# Patient Record
Sex: FEMALE | Race: BLACK OR AFRICAN AMERICAN | Employment: PART TIME | URBAN - METROPOLITAN AREA
[De-identification: names, ages, dates, MRNs, and addresses within clinical notes are randomized per-mention and may not be internally consistent; named-entity substitution may affect disease eponyms.]

---

## 2017-01-04 ENCOUNTER — APPOINTMENT (OUTPATIENT)
Dept: GENERAL RADIOLOGY | Age: 22
End: 2017-01-04
Payer: COMMERCIAL

## 2017-01-04 ENCOUNTER — HOSPITAL ENCOUNTER (EMERGENCY)
Age: 22
Discharge: HOME | End: 2017-01-04
Payer: COMMERCIAL

## 2017-01-04 ENCOUNTER — TELEPHONE (OUTPATIENT)
Dept: OBGYN | Age: 22
End: 2017-01-04

## 2017-01-04 VITALS
HEIGHT: 63 IN | WEIGHT: 143 LBS | BODY MASS INDEX: 25.34 KG/M2 | TEMPERATURE: 98.1 F | RESPIRATION RATE: 18 BRPM | DIASTOLIC BLOOD PRESSURE: 72 MMHG | SYSTOLIC BLOOD PRESSURE: 108 MMHG | OXYGEN SATURATION: 98 % | HEART RATE: 74 BPM

## 2017-01-04 DIAGNOSIS — R07.89 CHEST WALL PAIN: Primary | ICD-10-CM

## 2017-01-04 LAB
CHP ED QC CHECK: NORMAL
PREGNANCY TEST URINE, POC: NEGATIVE

## 2017-01-04 PROCEDURE — 99285 EMERGENCY DEPT VISIT HI MDM: CPT

## 2017-01-04 PROCEDURE — 71020 XR CHEST STANDARD TWO VW: CPT

## 2017-01-04 PROCEDURE — 93005 ELECTROCARDIOGRAM TRACING: CPT

## 2017-01-04 RX ORDER — NAPROXEN 500 MG/1
500 TABLET ORAL 2 TIMES DAILY WITH MEALS
Qty: 30 TABLET | Refills: 0 | Status: SHIPPED | OUTPATIENT
Start: 2017-01-04 | End: 2018-07-11

## 2017-01-04 RX ORDER — CYCLOBENZAPRINE HCL 10 MG
10 TABLET ORAL 3 TIMES DAILY PRN
Qty: 15 TABLET | Refills: 0 | Status: SHIPPED | OUTPATIENT
Start: 2017-01-04 | End: 2017-01-14

## 2017-01-04 ASSESSMENT — ENCOUNTER SYMPTOMS
SHORTNESS OF BREATH: 0
CHOKING: 0
RHINORRHEA: 0
ABDOMINAL PAIN: 0
EYE DISCHARGE: 0
COUGH: 0
WHEEZING: 0
CHEST TIGHTNESS: 0
NAUSEA: 0
COLOR CHANGE: 0
STRIDOR: 0
DIARRHEA: 0
CONSTIPATION: 0
SORE THROAT: 0
VOMITING: 0
ABDOMINAL DISTENTION: 0

## 2017-01-04 ASSESSMENT — PAIN DESCRIPTION - LOCATION: LOCATION: CHEST

## 2017-01-04 ASSESSMENT — PAIN SCALES - GENERAL: PAINLEVEL_OUTOF10: 5

## 2017-01-04 ASSESSMENT — PAIN DESCRIPTION - FREQUENCY: FREQUENCY: INTERMITTENT

## 2017-01-04 ASSESSMENT — PAIN DESCRIPTION - DESCRIPTORS: DESCRIPTORS: OTHER (COMMENT)

## 2017-01-04 ASSESSMENT — PAIN DESCRIPTION - ORIENTATION: ORIENTATION: LEFT

## 2017-01-06 LAB
EKG ATRIAL RATE: 70 BPM
EKG P AXIS: 49 DEGREES
EKG P-R INTERVAL: 112 MS
EKG Q-T INTERVAL: 388 MS
EKG QRS DURATION: 84 MS
EKG QTC CALCULATION (BAZETT): 419 MS
EKG R AXIS: 8 DEGREES
EKG T AXIS: 31 DEGREES
EKG VENTRICULAR RATE: 70 BPM

## 2017-01-18 ENCOUNTER — NURSE ONLY (OUTPATIENT)
Dept: OBGYN | Age: 22
End: 2017-01-18

## 2017-01-18 DIAGNOSIS — N92.6 IRREGULAR MENSES: Primary | ICD-10-CM

## 2017-01-18 LAB
CONTROL: YES
PREGNANCY TEST URINE, POC: NORMAL

## 2017-01-18 PROCEDURE — 81025 URINE PREGNANCY TEST: CPT | Performed by: OBSTETRICS & GYNECOLOGY

## 2017-01-19 PROCEDURE — 96372 THER/PROPH/DIAG INJ SC/IM: CPT | Performed by: OBSTETRICS & GYNECOLOGY

## 2017-01-19 RX ORDER — MEDROXYPROGESTERONE ACETATE 150 MG/ML
150 INJECTION, SUSPENSION INTRAMUSCULAR ONCE
Status: COMPLETED | OUTPATIENT
Start: 2017-01-19 | End: 2017-01-19

## 2017-01-19 RX ADMIN — MEDROXYPROGESTERONE ACETATE 150 MG: 150 INJECTION, SUSPENSION INTRAMUSCULAR at 09:48

## 2017-04-18 ENCOUNTER — NURSE ONLY (OUTPATIENT)
Dept: OBGYN | Age: 22
End: 2017-04-18

## 2017-04-18 DIAGNOSIS — N92.6 IRREGULAR MENSES: Primary | ICD-10-CM

## 2017-04-18 LAB
CONTROL: YES
PREGNANCY TEST URINE, POC: NORMAL

## 2017-04-18 PROCEDURE — 81025 URINE PREGNANCY TEST: CPT | Performed by: OBSTETRICS & GYNECOLOGY

## 2017-04-18 PROCEDURE — 96372 THER/PROPH/DIAG INJ SC/IM: CPT | Performed by: OBSTETRICS & GYNECOLOGY

## 2017-04-18 RX ORDER — MEDROXYPROGESTERONE ACETATE 150 MG/ML
150 INJECTION, SUSPENSION INTRAMUSCULAR ONCE
Status: COMPLETED | OUTPATIENT
Start: 2017-04-18 | End: 2017-04-18

## 2017-04-18 RX ADMIN — MEDROXYPROGESTERONE ACETATE 150 MG: 150 INJECTION, SUSPENSION INTRAMUSCULAR at 11:25

## 2017-07-10 ENCOUNTER — TELEPHONE (OUTPATIENT)
Dept: OBGYN | Age: 22
End: 2017-07-10

## 2017-07-10 RX ORDER — MEDROXYPROGESTERONE ACETATE 150 MG/ML
150 INJECTION, SUSPENSION INTRAMUSCULAR
Qty: 1 ML | Refills: 3 | Status: SHIPPED | OUTPATIENT
Start: 2017-07-10 | End: 2018-07-11

## 2017-07-11 ENCOUNTER — NURSE ONLY (OUTPATIENT)
Dept: OBGYN | Age: 22
End: 2017-07-11

## 2017-07-11 DIAGNOSIS — N92.6 IRREGULAR MENSES: Primary | ICD-10-CM

## 2017-07-11 LAB
CONTROL: YES
PREGNANCY TEST URINE, POC: NORMAL

## 2017-07-11 PROCEDURE — 81025 URINE PREGNANCY TEST: CPT | Performed by: OBSTETRICS & GYNECOLOGY

## 2017-07-11 PROCEDURE — 96372 THER/PROPH/DIAG INJ SC/IM: CPT | Performed by: OBSTETRICS & GYNECOLOGY

## 2017-07-11 RX ORDER — MEDROXYPROGESTERONE ACETATE 150 MG/ML
150 INJECTION, SUSPENSION INTRAMUSCULAR ONCE
Status: COMPLETED | OUTPATIENT
Start: 2017-07-11 | End: 2017-07-11

## 2017-07-11 RX ADMIN — MEDROXYPROGESTERONE ACETATE 150 MG: 150 INJECTION, SUSPENSION INTRAMUSCULAR at 10:51

## 2017-09-26 ENCOUNTER — NURSE ONLY (OUTPATIENT)
Dept: OBGYN | Age: 22
End: 2017-09-26

## 2017-09-26 DIAGNOSIS — N92.6 IRREGULAR MENSES: Primary | ICD-10-CM

## 2017-09-26 LAB
CONTROL: YES
PREGNANCY TEST URINE, POC: NEGATIVE

## 2017-09-26 PROCEDURE — 81025 URINE PREGNANCY TEST: CPT | Performed by: OBSTETRICS & GYNECOLOGY

## 2017-09-26 PROCEDURE — 96372 THER/PROPH/DIAG INJ SC/IM: CPT | Performed by: OBSTETRICS & GYNECOLOGY

## 2017-09-26 RX ORDER — MEDROXYPROGESTERONE ACETATE 150 MG/ML
150 INJECTION, SUSPENSION INTRAMUSCULAR ONCE
Status: COMPLETED | OUTPATIENT
Start: 2017-09-26 | End: 2017-09-26

## 2017-09-26 RX ADMIN — MEDROXYPROGESTERONE ACETATE 150 MG: 150 INJECTION, SUSPENSION INTRAMUSCULAR at 12:11

## 2017-11-27 ENCOUNTER — OFFICE VISIT (OUTPATIENT)
Dept: OBGYN | Age: 22
End: 2017-11-27

## 2017-11-27 VITALS — DIASTOLIC BLOOD PRESSURE: 64 MMHG | HEART RATE: 84 BPM | HEIGHT: 63 IN | SYSTOLIC BLOOD PRESSURE: 118 MMHG

## 2017-11-27 DIAGNOSIS — Z30.42 DEPO-PROVERA CONTRACEPTIVE STATUS: Primary | ICD-10-CM

## 2017-11-27 PROCEDURE — G8484 FLU IMMUNIZE NO ADMIN: HCPCS | Performed by: OBSTETRICS & GYNECOLOGY

## 2017-11-27 PROCEDURE — 99212 OFFICE O/P EST SF 10 MIN: CPT | Performed by: OBSTETRICS & GYNECOLOGY

## 2017-11-27 PROCEDURE — G8421 BMI NOT CALCULATED: HCPCS | Performed by: OBSTETRICS & GYNECOLOGY

## 2017-11-27 PROCEDURE — 1036F TOBACCO NON-USER: CPT | Performed by: OBSTETRICS & GYNECOLOGY

## 2017-11-27 PROCEDURE — G8427 DOCREV CUR MEDS BY ELIG CLIN: HCPCS | Performed by: OBSTETRICS & GYNECOLOGY

## 2017-11-27 RX ORDER — ASCORBIC ACID, CHOLECALCIFEROL, .ALPHA.-TOCOPHEROL ACETATE, DL-, PYRIDOXINE HYDROCHLORIDE, FOLIC ACID, CYANOCOBALAMIN, BIOTIN, CALCIUM CARBONATE, FERROUS ASPARTO GLYCINATE, IRON, POTASSIUM IODIDE, MAGNESIUM OXIDE, DOCONEXENT AND LOWBUSH BLUEBERRY 60; 1000; 10; 26; 400; 13; 280; 80; 9; 9; 150; 25; 350; 25; 600 MG/1; [IU]/1; [IU]/1; MG/1; UG/1; UG/1; UG/1; MG/1; MG/1; MG/1; UG/1; MG/1; MG/1; MG/1; UG/1
1 CAPSULE, GELATIN COATED ORAL DAILY
Qty: 30 CAPSULE | Refills: 3 | Status: SHIPPED | OUTPATIENT
Start: 2017-11-27 | End: 2018-07-11

## 2017-11-27 NOTE — PROGRESS NOTES
Heart : Regular S1/S2, no M/R/G  Abdomen: Soft , NT, ND , + BS   Pelvic exam : deferred    Assessment:     1. Depo-Provera contraceptive status         Plan:     Advised to stop depo-provera   Wants to stay off BC at this time   POssible attempt to conceive, PNV prescribed. F/u as needed. No orders of the defined types were placed in this encounter. Orders Placed This Encounter   Medications    Ffizcm-NbZwb-MqLxi-Meth-FA-DHA (PRENATE MINI) 18-0.6-0.4-350 MG CAPS     Sig: Take 1 tablet by mouth daily     Dispense:  30 capsule     Refill:  3       Follow Up:  Return if symptoms worsen or fail to improve.         Shalom Graf MD

## 2017-12-07 PROBLEM — Z30.42 DEPO-PROVERA CONTRACEPTIVE STATUS: Status: ACTIVE | Noted: 2017-12-07

## 2018-01-16 ENCOUNTER — OFFICE VISIT (OUTPATIENT)
Dept: OBGYN | Age: 23
End: 2018-01-16

## 2018-01-16 VITALS
HEIGHT: 63 IN | SYSTOLIC BLOOD PRESSURE: 104 MMHG | BODY MASS INDEX: 24.63 KG/M2 | WEIGHT: 139 LBS | HEART RATE: 76 BPM | DIASTOLIC BLOOD PRESSURE: 62 MMHG

## 2018-01-16 DIAGNOSIS — O03.9 SAB (SPONTANEOUS ABORTION): Primary | ICD-10-CM

## 2018-01-16 LAB
CONTROL: YES
PREGNANCY TEST URINE, POC: NORMAL

## 2018-01-16 PROCEDURE — 81025 URINE PREGNANCY TEST: CPT | Performed by: OBSTETRICS & GYNECOLOGY

## 2018-01-16 PROCEDURE — 99213 OFFICE O/P EST LOW 20 MIN: CPT | Performed by: OBSTETRICS & GYNECOLOGY

## 2018-01-16 ASSESSMENT — PATIENT HEALTH QUESTIONNAIRE - PHQ9
SUM OF ALL RESPONSES TO PHQ9 QUESTIONS 1 & 2: 1
SUM OF ALL RESPONSES TO PHQ QUESTIONS 1-9: 1
2. FEELING DOWN, DEPRESSED OR HOPELESS: 0
1. LITTLE INTEREST OR PLEASURE IN DOING THINGS: 1

## 2018-01-29 ENCOUNTER — TELEPHONE (OUTPATIENT)
Dept: OBGYN CLINIC | Age: 23
End: 2018-01-29

## 2018-01-29 NOTE — TELEPHONE ENCOUNTER
Patient started bleeding today. It started heavy then became light. She has abdominal pain. Color of the blood started out  red and towards the middle of the day it turned light pink.  Please advice

## 2018-02-05 ENCOUNTER — OFFICE VISIT (OUTPATIENT)
Dept: OBGYN CLINIC | Age: 23
End: 2018-02-05
Payer: COMMERCIAL

## 2018-02-05 VITALS
HEART RATE: 84 BPM | WEIGHT: 137 LBS | BODY MASS INDEX: 24.27 KG/M2 | HEIGHT: 63 IN | DIASTOLIC BLOOD PRESSURE: 58 MMHG | SYSTOLIC BLOOD PRESSURE: 92 MMHG

## 2018-02-05 DIAGNOSIS — O03.9 SAB (SPONTANEOUS ABORTION): Primary | ICD-10-CM

## 2018-02-05 PROCEDURE — G8484 FLU IMMUNIZE NO ADMIN: HCPCS | Performed by: OBSTETRICS & GYNECOLOGY

## 2018-02-05 PROCEDURE — 99212 OFFICE O/P EST SF 10 MIN: CPT | Performed by: OBSTETRICS & GYNECOLOGY

## 2018-02-05 PROCEDURE — G8420 CALC BMI NORM PARAMETERS: HCPCS | Performed by: OBSTETRICS & GYNECOLOGY

## 2018-02-05 PROCEDURE — 1036F TOBACCO NON-USER: CPT | Performed by: OBSTETRICS & GYNECOLOGY

## 2018-02-05 PROCEDURE — G8427 DOCREV CUR MEDS BY ELIG CLIN: HCPCS | Performed by: OBSTETRICS & GYNECOLOGY

## 2018-02-09 PROBLEM — O03.9 SAB (SPONTANEOUS ABORTION): Status: ACTIVE | Noted: 2018-02-09

## 2018-02-13 ASSESSMENT — ENCOUNTER SYMPTOMS
SHORTNESS OF BREATH: 0
VOMITING: 0
NAUSEA: 0
COUGH: 0

## 2018-02-25 ENCOUNTER — HOSPITAL ENCOUNTER (EMERGENCY)
Age: 23
Discharge: HOME | End: 2018-02-25
Attending: FAMILY MEDICINE
Payer: COMMERCIAL

## 2018-02-25 VITALS
SYSTOLIC BLOOD PRESSURE: 113 MMHG | RESPIRATION RATE: 14 BRPM | BODY MASS INDEX: 23.56 KG/M2 | DIASTOLIC BLOOD PRESSURE: 63 MMHG | TEMPERATURE: 98.9 F | WEIGHT: 133 LBS | OXYGEN SATURATION: 99 % | HEART RATE: 82 BPM

## 2018-02-25 DIAGNOSIS — N39.0 ACUTE UTI: Primary | ICD-10-CM

## 2018-02-25 LAB
BACTERIA: ABNORMAL /HPF
BILIRUBIN URINE: NEGATIVE
BLOOD, URINE: ABNORMAL
CLARITY: ABNORMAL
COLOR: ABNORMAL
CRYSTALS, UA: ABNORMAL
EPITHELIAL CELLS, UA: ABNORMAL /HPF
GLUCOSE URINE: NEGATIVE MG/DL
GONADOTROPIN, CHORIONIC (HCG) QUANT: <0.1 MIU/ML
KETONES, URINE: NEGATIVE MG/DL
LEUKOCYTE ESTERASE, URINE: ABNORMAL
MUCUS: PRESENT
NITRITE, URINE: NEGATIVE
PH UA: 6 (ref 5–9)
PROTEIN UA: 30 MG/DL
RBC UA: ABNORMAL /HPF (ref 0–2)
SPECIFIC GRAVITY UA: 1.02 (ref 1–1.03)
URINE REFLEX TO CULTURE: YES
UROBILINOGEN, URINE: 2 E.U./DL
WBC UA: ABNORMAL /HPF (ref 0–5)

## 2018-02-25 PROCEDURE — 87086 URINE CULTURE/COLONY COUNT: CPT

## 2018-02-25 PROCEDURE — 81001 URINALYSIS AUTO W/SCOPE: CPT

## 2018-02-25 PROCEDURE — 84702 CHORIONIC GONADOTROPIN TEST: CPT

## 2018-02-25 PROCEDURE — 99283 EMERGENCY DEPT VISIT LOW MDM: CPT

## 2018-02-25 PROCEDURE — 36415 COLL VENOUS BLD VENIPUNCTURE: CPT

## 2018-02-25 RX ORDER — SULFAMETHOXAZOLE AND TRIMETHOPRIM 800; 160 MG/1; MG/1
1 TABLET ORAL 2 TIMES DAILY
Qty: 20 TABLET | Refills: 0 | Status: SHIPPED | OUTPATIENT
Start: 2018-02-25 | End: 2018-03-02

## 2018-02-25 ASSESSMENT — ENCOUNTER SYMPTOMS
RESPIRATORY NEGATIVE: 1
FLATUS: 0
ABDOMINAL PAIN: 1
HEMATOCHEZIA: 0
CONSTIPATION: 0
NAUSEA: 0
HEMATEMESIS: 0
BELCHING: 0
EYES NEGATIVE: 1
SHORTNESS OF BREATH: 0
SORE THROAT: 0
COUGH: 0

## 2018-02-25 ASSESSMENT — PAIN DESCRIPTION - LOCATION: LOCATION: ABDOMEN

## 2018-02-25 ASSESSMENT — PAIN SCALES - GENERAL: PAINLEVEL_OUTOF10: 7

## 2018-02-25 ASSESSMENT — PAIN DESCRIPTION - FREQUENCY: FREQUENCY: CONTINUOUS

## 2018-02-25 ASSESSMENT — PAIN DESCRIPTION - DESCRIPTORS: DESCRIPTORS: CRAMPING;SHARP

## 2018-02-25 ASSESSMENT — PAIN DESCRIPTION - ORIENTATION: ORIENTATION: RIGHT;LEFT;LOWER;MID

## 2018-02-26 NOTE — ED PROVIDER NOTES
chest pain. Gastrointestinal: Positive for abdominal pain. Negative for anorexia, constipation, flatus, hematemesis, hematochezia, melena and nausea. Endocrine: Negative. Genitourinary: Positive for dysuria and vaginal bleeding. Negative for hematuria and vaginal discharge. Musculoskeletal: Negative. All other systems reviewed and are negative. Except as noted above the remainder of the review of systems was reviewed and negative. PAST MEDICAL HISTORY     Past Medical History:   Diagnosis Date    Spontaneous           SURGICAL HISTORY     History reviewed. No pertinent surgical history. CURRENT MEDICATIONS       Previous Medications    MEDROXYPROGESTERONE (DEPO-PROVERA) 150 MG/ML INJECTION    Inject 1 mL into the muscle every 3 months    NAPROXEN (NAPROSYN) 500 MG TABLET    Take 1 tablet by mouth 2 times daily (with meals)    ALBQVS-NZTHY-GLKEL-METH-FA-DHA (PRENATE MINI) 18-0.6-0.4-350 MG CAPS    Take 1 tablet by mouth daily    PRENATAL VIT-FE FUMARATE-FA (PRENATAL VITAMINS) 28-0.8 MG TABS    Take 1 tablet by mouth daily       ALLERGIES     Patient has no known allergies.     FAMILY HISTORY       Family History   Problem Relation Age of Onset    Breast Cancer Neg Hx     Ovarian Cancer Neg Hx     Cervical Cancer Neg Hx     Uterine Cancer Neg Hx           SOCIAL HISTORY       Social History     Social History    Marital status: Single     Spouse name: N/A    Number of children: N/A    Years of education: N/A     Social History Main Topics    Smoking status: Never Smoker    Smokeless tobacco: Never Used    Alcohol use No    Drug use: No    Sexual activity: Yes     Partners: Male     Other Topics Concern    None     Social History Narrative    None       SCREENINGS             PHYSICAL EXAM    (up to 7 for level 4, 8 or more for level 5)     ED Triage Vitals [18 1806]   BP Temp Temp src Pulse Resp SpO2 Height Weight   113/63 98.9 °F (37.2 °C) -- 82 14 99 % --

## 2018-02-27 LAB — URINE CULTURE, ROUTINE: NORMAL

## 2018-03-24 ENCOUNTER — HOSPITAL ENCOUNTER (EMERGENCY)
Age: 23
Discharge: HOME | End: 2018-03-24
Attending: EMERGENCY MEDICINE
Payer: COMMERCIAL

## 2018-03-24 VITALS
SYSTOLIC BLOOD PRESSURE: 98 MMHG | HEIGHT: 63 IN | DIASTOLIC BLOOD PRESSURE: 56 MMHG | TEMPERATURE: 98.9 F | BODY MASS INDEX: 23.57 KG/M2 | WEIGHT: 133 LBS | HEART RATE: 70 BPM | OXYGEN SATURATION: 100 % | RESPIRATION RATE: 16 BRPM

## 2018-03-24 DIAGNOSIS — G43.009 MIGRAINE WITHOUT AURA AND WITHOUT STATUS MIGRAINOSUS, NOT INTRACTABLE: ICD-10-CM

## 2018-03-24 DIAGNOSIS — N30.00 ACUTE CYSTITIS WITHOUT HEMATURIA: Primary | ICD-10-CM

## 2018-03-24 LAB
BACTERIA: ABNORMAL /HPF
BASOPHILS ABSOLUTE: 0.1 K/UL (ref 0–0.2)
BASOPHILS RELATIVE PERCENT: 0.6 %
BILIRUBIN URINE: NEGATIVE
BLOOD, URINE: ABNORMAL
CHP ED QC CHECK: NORMAL
CLARITY: ABNORMAL
COLOR: ABNORMAL
EOSINOPHILS ABSOLUTE: 0 K/UL (ref 0–0.7)
EOSINOPHILS RELATIVE PERCENT: 0.1 %
GLUCOSE URINE: NEGATIVE MG/DL
HCT VFR BLD CALC: 37.2 % (ref 37–47)
HEMOGLOBIN: 12.6 G/DL (ref 12–16)
KETONES, URINE: ABNORMAL MG/DL
LEUKOCYTE ESTERASE, URINE: ABNORMAL
LYMPHOCYTES ABSOLUTE: 0.4 K/UL (ref 1–4.8)
LYMPHOCYTES RELATIVE PERCENT: 3.7 %
MCH RBC QN AUTO: 28.3 PG (ref 27–31.3)
MCHC RBC AUTO-ENTMCNC: 34 % (ref 33–37)
MCV RBC AUTO: 83.3 FL (ref 82–100)
MONOCYTES ABSOLUTE: 0.7 K/UL (ref 0.2–0.8)
MONOCYTES RELATIVE PERCENT: 6.1 %
NEUTROPHILS ABSOLUTE: 9.7 K/UL (ref 1.4–6.5)
NEUTROPHILS RELATIVE PERCENT: 89.5 %
NITRITE, URINE: POSITIVE
PDW BLD-RTO: 13.4 % (ref 11.5–14.5)
PH UA: 6 (ref 5–9)
PLATELET # BLD: 140 K/UL (ref 130–400)
PREGNANCY TEST URINE, POC: NEGATIVE
PROTEIN UA: 100 MG/DL
RBC # BLD: 4.47 M/UL (ref 4.2–5.4)
RBC UA: ABNORMAL /HPF (ref 0–2)
SPECIFIC GRAVITY UA: 1.02 (ref 1–1.03)
URINE REFLEX TO CULTURE: YES
UROBILINOGEN, URINE: 4 E.U./DL
WBC # BLD: 10.8 K/UL (ref 4.8–10.8)
WBC UA: ABNORMAL /HPF (ref 0–5)

## 2018-03-24 PROCEDURE — 2580000003 HC RX 258: Performed by: EMERGENCY MEDICINE

## 2018-03-24 PROCEDURE — 99284 EMERGENCY DEPT VISIT MOD MDM: CPT

## 2018-03-24 PROCEDURE — 87186 SC STD MICRODIL/AGAR DIL: CPT

## 2018-03-24 PROCEDURE — 6360000002 HC RX W HCPCS: Performed by: EMERGENCY MEDICINE

## 2018-03-24 PROCEDURE — 81001 URINALYSIS AUTO W/SCOPE: CPT

## 2018-03-24 PROCEDURE — 96365 THER/PROPH/DIAG IV INF INIT: CPT

## 2018-03-24 PROCEDURE — 96375 TX/PRO/DX INJ NEW DRUG ADDON: CPT

## 2018-03-24 PROCEDURE — 87086 URINE CULTURE/COLONY COUNT: CPT

## 2018-03-24 PROCEDURE — 85025 COMPLETE CBC W/AUTO DIFF WBC: CPT

## 2018-03-24 PROCEDURE — 36415 COLL VENOUS BLD VENIPUNCTURE: CPT

## 2018-03-24 PROCEDURE — 87077 CULTURE AEROBIC IDENTIFY: CPT

## 2018-03-24 RX ORDER — 0.9 % SODIUM CHLORIDE 0.9 %
1000 INTRAVENOUS SOLUTION INTRAVENOUS ONCE
Status: COMPLETED | OUTPATIENT
Start: 2018-03-24 | End: 2018-03-24

## 2018-03-24 RX ORDER — KETOROLAC TROMETHAMINE 30 MG/ML
30 INJECTION, SOLUTION INTRAMUSCULAR; INTRAVENOUS ONCE
Status: COMPLETED | OUTPATIENT
Start: 2018-03-24 | End: 2018-03-24

## 2018-03-24 RX ORDER — DICYCLOMINE HYDROCHLORIDE 10 MG/ML
20 INJECTION INTRAMUSCULAR ONCE
Status: DISCONTINUED | OUTPATIENT
Start: 2018-03-24 | End: 2018-03-24

## 2018-03-24 RX ORDER — ONDANSETRON 2 MG/ML
4 INJECTION INTRAMUSCULAR; INTRAVENOUS ONCE
Status: COMPLETED | OUTPATIENT
Start: 2018-03-24 | End: 2018-03-24

## 2018-03-24 RX ORDER — CEPHALEXIN 500 MG/1
500 CAPSULE ORAL 4 TIMES DAILY
Qty: 40 CAPSULE | Refills: 0 | Status: SHIPPED | OUTPATIENT
Start: 2018-03-24 | End: 2018-04-03

## 2018-03-24 RX ORDER — BUTALBITAL, ASPIRIN, AND CAFFEINE 325; 50; 40 MG/1; MG/1; MG/1
1 CAPSULE ORAL EVERY 4 HOURS PRN
Qty: 12 CAPSULE | Refills: 0 | Status: ON HOLD | OUTPATIENT
Start: 2018-03-24 | End: 2020-01-23 | Stop reason: HOSPADM

## 2018-03-24 RX ADMIN — CEFTRIAXONE 1 G: 1 INJECTION, POWDER, FOR SOLUTION INTRAMUSCULAR; INTRAVENOUS at 17:54

## 2018-03-24 RX ADMIN — SODIUM CHLORIDE 1000 ML: 9 INJECTION, SOLUTION INTRAVENOUS at 16:20

## 2018-03-24 RX ADMIN — ONDANSETRON 4 MG: 2 INJECTION INTRAMUSCULAR; INTRAVENOUS at 16:20

## 2018-03-24 RX ADMIN — KETOROLAC TROMETHAMINE 30 MG: 30 INJECTION, SOLUTION INTRAMUSCULAR; INTRAVENOUS at 17:54

## 2018-03-24 ASSESSMENT — ENCOUNTER SYMPTOMS
CHEST TIGHTNESS: 0
VOMITING: 1
WHEEZING: 0
PHOTOPHOBIA: 0
VOICE CHANGE: 0
BACK PAIN: 0
SHORTNESS OF BREATH: 0
RHINORRHEA: 0
ABDOMINAL DISTENTION: 0
ABDOMINAL PAIN: 0
TROUBLE SWALLOWING: 0
ANAL BLEEDING: 0
CONSTIPATION: 0
CHOKING: 0
EYE ITCHING: 0
EYE PAIN: 0
STRIDOR: 0
FACIAL SWELLING: 0
NAUSEA: 1
SINUS PRESSURE: 0
BLOOD IN STOOL: 0
EYE REDNESS: 0
COLOR CHANGE: 0
EYE DISCHARGE: 0
SORE THROAT: 0
DIARRHEA: 0
COUGH: 0

## 2018-03-24 ASSESSMENT — PAIN SCALES - GENERAL
PAINLEVEL_OUTOF10: 7
PAINLEVEL_OUTOF10: 7

## 2018-03-24 ASSESSMENT — PAIN DESCRIPTION - LOCATION: LOCATION: HEAD

## 2018-03-24 ASSESSMENT — PAIN DESCRIPTION - DESCRIPTORS: DESCRIPTORS: ACHING

## 2018-03-24 ASSESSMENT — PAIN DESCRIPTION - ORIENTATION: ORIENTATION: ANTERIOR

## 2018-03-24 NOTE — ED TRIAGE NOTES
A & Ox4. Skin pink warm and dry. Points to lower abdomen for pain. States emesis x 5-6 today along with diarrhea x 3 today. Points to forehead for headache. Denies trying anything for pain.

## 2018-03-26 LAB
ORGANISM: ABNORMAL
URINE CULTURE, ROUTINE: ABNORMAL
URINE CULTURE, ROUTINE: ABNORMAL

## 2018-04-28 ENCOUNTER — HOSPITAL ENCOUNTER (EMERGENCY)
Age: 23
Discharge: HOME | End: 2018-04-28
Payer: COMMERCIAL

## 2018-04-28 VITALS
SYSTOLIC BLOOD PRESSURE: 121 MMHG | HEIGHT: 63 IN | HEART RATE: 90 BPM | DIASTOLIC BLOOD PRESSURE: 74 MMHG | TEMPERATURE: 98.6 F | OXYGEN SATURATION: 98 % | WEIGHT: 131 LBS | BODY MASS INDEX: 23.21 KG/M2 | RESPIRATION RATE: 18 BRPM

## 2018-04-28 DIAGNOSIS — H66.90 ACUTE OTITIS MEDIA, UNSPECIFIED OTITIS MEDIA TYPE: Primary | ICD-10-CM

## 2018-04-28 DIAGNOSIS — N30.00 ACUTE CYSTITIS WITHOUT HEMATURIA: ICD-10-CM

## 2018-04-28 LAB
AMORPHOUS: NORMAL
BACTERIA: NORMAL /HPF
BILIRUBIN URINE: NEGATIVE
BLOOD, URINE: ABNORMAL
CLARITY: ABNORMAL
COLOR: YELLOW
GLUCOSE URINE: NEGATIVE MG/DL
KETONES, URINE: NEGATIVE MG/DL
LEUKOCYTE ESTERASE, URINE: ABNORMAL
MUCUS: PRESENT
NITRITE, URINE: NEGATIVE
PH UA: 7.5 (ref 5–9)
PROTEIN UA: NEGATIVE MG/DL
RBC UA: NORMAL /HPF (ref 0–2)
SPECIFIC GRAVITY UA: 1.02 (ref 1–1.03)
URINE REFLEX TO CULTURE: YES
UROBILINOGEN, URINE: 1 E.U./DL
WBC UA: NORMAL /HPF (ref 0–5)

## 2018-04-28 PROCEDURE — 99283 EMERGENCY DEPT VISIT LOW MDM: CPT

## 2018-04-28 PROCEDURE — 87086 URINE CULTURE/COLONY COUNT: CPT

## 2018-04-28 PROCEDURE — 81001 URINALYSIS AUTO W/SCOPE: CPT

## 2018-04-28 RX ORDER — AMOXICILLIN AND CLAVULANATE POTASSIUM 875; 125 MG/1; MG/1
1 TABLET, FILM COATED ORAL 2 TIMES DAILY
Qty: 20 TABLET | Refills: 0 | Status: SHIPPED | OUTPATIENT
Start: 2018-04-28 | End: 2018-05-08

## 2018-04-28 ASSESSMENT — ENCOUNTER SYMPTOMS
SHORTNESS OF BREATH: 0
BACK PAIN: 0
DIARRHEA: 0
SORE THROAT: 0
TROUBLE SWALLOWING: 0
COLOR CHANGE: 0
ABDOMINAL PAIN: 0
CONSTIPATION: 0
VOICE CHANGE: 0
COUGH: 0
VOMITING: 0
NAUSEA: 0

## 2018-04-28 ASSESSMENT — PAIN DESCRIPTION - PAIN TYPE: TYPE: ACUTE PAIN

## 2018-04-28 ASSESSMENT — PAIN DESCRIPTION - LOCATION: LOCATION: EAR;BACK

## 2018-04-28 ASSESSMENT — PAIN SCALES - GENERAL: PAINLEVEL_OUTOF10: 7

## 2018-04-28 ASSESSMENT — PAIN DESCRIPTION - ORIENTATION: ORIENTATION: RIGHT;LOWER

## 2018-04-30 LAB — URINE CULTURE, ROUTINE: NORMAL

## 2018-07-11 ENCOUNTER — OFFICE VISIT (OUTPATIENT)
Dept: OBGYN CLINIC | Age: 23
End: 2018-07-11
Payer: COMMERCIAL

## 2018-07-11 VITALS
BODY MASS INDEX: 23.04 KG/M2 | HEIGHT: 63 IN | WEIGHT: 130 LBS | SYSTOLIC BLOOD PRESSURE: 110 MMHG | DIASTOLIC BLOOD PRESSURE: 64 MMHG | HEART RATE: 84 BPM

## 2018-07-11 DIAGNOSIS — Z11.3 SCREEN FOR STD (SEXUALLY TRANSMITTED DISEASE): ICD-10-CM

## 2018-07-11 DIAGNOSIS — Z31.69 ENCOUNTER FOR PRECONCEPTION CONSULTATION: Primary | ICD-10-CM

## 2018-07-11 LAB
HEPATITIS B SURFACE ANTIGEN INTERPRETATION: NORMAL
HEPATITIS C ANTIBODY INTERPRETATION: NORMAL

## 2018-07-11 PROCEDURE — G8427 DOCREV CUR MEDS BY ELIG CLIN: HCPCS | Performed by: OBSTETRICS & GYNECOLOGY

## 2018-07-11 PROCEDURE — 1036F TOBACCO NON-USER: CPT | Performed by: OBSTETRICS & GYNECOLOGY

## 2018-07-11 PROCEDURE — G8420 CALC BMI NORM PARAMETERS: HCPCS | Performed by: OBSTETRICS & GYNECOLOGY

## 2018-07-11 PROCEDURE — 99213 OFFICE O/P EST LOW 20 MIN: CPT | Performed by: OBSTETRICS & GYNECOLOGY

## 2018-07-11 RX ORDER — ASCORBIC ACID, CHOLECALCIFEROL, .ALPHA.-TOCOPHEROL ACETATE, DL-, PYRIDOXINE HYDROCHLORIDE, FOLIC ACID, CYANOCOBALAMIN, BIOTIN, CALCIUM CARBONATE, FERROUS ASPARTO GLYCINATE, IRON, POTASSIUM IODIDE, MAGNESIUM OXIDE, DOCONEXENT AND LOWBUSH BLUEBERRY 60; 1000; 10; 26; 400; 13; 280; 80; 9; 9; 150; 25; 350; 25; 600 MG/1; [IU]/1; [IU]/1; MG/1; UG/1; UG/1; UG/1; MG/1; MG/1; MG/1; UG/1; MG/1; MG/1; MG/1; UG/1
1 CAPSULE, GELATIN COATED ORAL DAILY
Qty: 30 CAPSULE | Refills: 9 | Status: SHIPPED | OUTPATIENT
Start: 2018-07-11 | End: 2020-11-26

## 2018-07-13 LAB — HIV-1 AND HIV-2 ANTIBODIES: NEGATIVE

## 2018-07-14 LAB
SPECIMEN SOURCE: NORMAL
T. VAGINALIS AMPLIFIED: NEGATIVE

## 2018-07-16 LAB
C. TRACHOMATIS DNA ,URINE: POSITIVE
N. GONORRHOEAE DNA, URINE: NEGATIVE

## 2018-07-16 RX ORDER — AZITHROMYCIN 500 MG/1
1000 TABLET, FILM COATED ORAL ONCE
Qty: 2 TABLET | Refills: 0 | Status: SHIPPED | OUTPATIENT
Start: 2018-07-16 | End: 2018-07-16

## 2018-08-10 PROBLEM — Z11.3 SCREEN FOR STD (SEXUALLY TRANSMITTED DISEASE): Status: RESOLVED | Noted: 2018-07-11 | Resolved: 2018-08-10

## 2018-08-29 ENCOUNTER — OFFICE VISIT (OUTPATIENT)
Dept: OBGYN CLINIC | Age: 23
End: 2018-08-29
Payer: COMMERCIAL

## 2018-08-29 VITALS
DIASTOLIC BLOOD PRESSURE: 62 MMHG | BODY MASS INDEX: 23.57 KG/M2 | HEART RATE: 64 BPM | WEIGHT: 133 LBS | SYSTOLIC BLOOD PRESSURE: 110 MMHG | HEIGHT: 63 IN

## 2018-08-29 DIAGNOSIS — Z11.3 SCREEN FOR STD (SEXUALLY TRANSMITTED DISEASE): ICD-10-CM

## 2018-08-29 DIAGNOSIS — A74.9 CHLAMYDIA: Primary | ICD-10-CM

## 2018-08-29 PROCEDURE — G8427 DOCREV CUR MEDS BY ELIG CLIN: HCPCS | Performed by: OBSTETRICS & GYNECOLOGY

## 2018-08-29 PROCEDURE — 1036F TOBACCO NON-USER: CPT | Performed by: OBSTETRICS & GYNECOLOGY

## 2018-08-29 PROCEDURE — 99213 OFFICE O/P EST LOW 20 MIN: CPT | Performed by: OBSTETRICS & GYNECOLOGY

## 2018-08-29 PROCEDURE — G8420 CALC BMI NORM PARAMETERS: HCPCS | Performed by: OBSTETRICS & GYNECOLOGY

## 2018-09-30 PROBLEM — A74.9 CHLAMYDIA: Status: ACTIVE | Noted: 2018-09-30

## 2018-09-30 PROBLEM — Z11.3 SCREEN FOR STD (SEXUALLY TRANSMITTED DISEASE): Status: ACTIVE | Noted: 2018-09-30

## 2018-10-01 NOTE — PROGRESS NOTES
Chaperone for Intimate Exam    1. Was chaperone offered as part of the rooming process? offered, accepted   2. If Chaperone is declined by patient, NA: chaperone was available and exam completed  3. Chaperone is Hanane Hernandez.
Trachomatis / N. Gonorrhoeae, DNA    C. Trachomatis / N. Gonorrhoeae, DNA   2. Screen for STD (sexually transmitted disease)         Plan:     Vaginal cultures for Chlamydia sent for test of cure  The patient was seen and counseled on all sexually transmitted diseases their symptoms and treatments. Barrier recommendations were made. The patient was made aware of all testing options available to her. Following is the framework for sexually transmitted infection risk reduction counseling I discuss with patients:   Ask I routinely obtain a sexual and substance use history for all patients to assess risk. I do Reinforce confidentiality; I try to be tactful, clear, and nonjudgmental; I check for  assumptions. \"To provide the best care, I ask all my patients about their sexual activity - so tell me about your sex life. \"   \"Tell me about your partners. \" (gender, number, new partners, partners with other partners)   \"What types of sex have you been having? \" (vaginal, anal, oral)   \"How do you protect your partners and yourself during sex? \"    Intervene I provide patients with brief, tailored, behavioral interventions for risk reduction. I Discuss risk with patients:   Unprotected sexual activity   Anonymous partners   Patient or partners with recent STI   History of recreational or intravenous drug use (party drugs, methamphetamines)   Exchange of sex for money or drugs   Recent incarceration   I assess patient's knowledge and misconceptions about STI transmission and assess attitudes and beliefs:   \"What are your concerns about giving or getting an STI? \"   I assess circumstances affecting behaviors (what, where, and with whom; triggers): \"What makes it difficult to use condoms with your partners? \"   \"How do you tell your partner about your HIV status/your STI infection? \"   I assess patient's readiness to change   I negotiate a behavioral goal:   \"What is one thing you can do to reduce your risk of getting HIV

## 2018-10-30 PROBLEM — Z11.3 SCREEN FOR STD (SEXUALLY TRANSMITTED DISEASE): Status: RESOLVED | Noted: 2018-09-30 | Resolved: 2018-10-30

## 2019-05-22 ENCOUNTER — HOSPITAL ENCOUNTER (EMERGENCY)
Age: 24
Discharge: HOME | End: 2019-05-22
Payer: COMMERCIAL

## 2019-05-22 VITALS
OXYGEN SATURATION: 98 % | SYSTOLIC BLOOD PRESSURE: 115 MMHG | TEMPERATURE: 98.2 F | HEART RATE: 88 BPM | RESPIRATION RATE: 20 BRPM | BODY MASS INDEX: 26.66 KG/M2 | HEIGHT: 65 IN | WEIGHT: 160 LBS | DIASTOLIC BLOOD PRESSURE: 74 MMHG

## 2019-05-22 DIAGNOSIS — N39.0 ACUTE UTI: Primary | ICD-10-CM

## 2019-05-22 DIAGNOSIS — Z20.2 POSSIBLE EXPOSURE TO STD: ICD-10-CM

## 2019-05-22 LAB
BACTERIA: ABNORMAL /HPF
BILIRUBIN URINE: NEGATIVE
BLOOD, URINE: NEGATIVE
CLARITY: ABNORMAL
COLOR: YELLOW
EPITHELIAL CELLS, UA: ABNORMAL /HPF (ref 0–5)
GLUCOSE URINE: NEGATIVE MG/DL
KETONES, URINE: ABNORMAL MG/DL
LEUKOCYTE ESTERASE, URINE: ABNORMAL
MUCUS: PRESENT
NITRITE, URINE: POSITIVE
PH UA: 6.5 (ref 5–9)
PROTEIN UA: NEGATIVE MG/DL
RBC UA: ABNORMAL /HPF (ref 0–5)
SPECIFIC GRAVITY UA: 1.03 (ref 1–1.03)
URINE REFLEX TO CULTURE: YES
UROBILINOGEN, URINE: 1 E.U./DL
WBC UA: ABNORMAL /HPF (ref 0–5)

## 2019-05-22 PROCEDURE — 87186 SC STD MICRODIL/AGAR DIL: CPT

## 2019-05-22 PROCEDURE — 96372 THER/PROPH/DIAG INJ SC/IM: CPT

## 2019-05-22 PROCEDURE — 87591 N.GONORRHOEAE DNA AMP PROB: CPT

## 2019-05-22 PROCEDURE — 6360000002 HC RX W HCPCS: Performed by: NURSE PRACTITIONER

## 2019-05-22 PROCEDURE — 87491 CHLMYD TRACH DNA AMP PROBE: CPT

## 2019-05-22 PROCEDURE — 81001 URINALYSIS AUTO W/SCOPE: CPT

## 2019-05-22 PROCEDURE — 99283 EMERGENCY DEPT VISIT LOW MDM: CPT

## 2019-05-22 PROCEDURE — 87086 URINE CULTURE/COLONY COUNT: CPT

## 2019-05-22 PROCEDURE — 6370000000 HC RX 637 (ALT 250 FOR IP): Performed by: NURSE PRACTITIONER

## 2019-05-22 PROCEDURE — 87077 CULTURE AEROBIC IDENTIFY: CPT

## 2019-05-22 RX ORDER — AZITHROMYCIN 500 MG/1
1000 TABLET, FILM COATED ORAL ONCE
Status: COMPLETED | OUTPATIENT
Start: 2019-05-22 | End: 2019-05-22

## 2019-05-22 RX ORDER — CEFTRIAXONE SODIUM 250 MG/1
250 INJECTION, POWDER, FOR SOLUTION INTRAMUSCULAR; INTRAVENOUS ONCE
Status: COMPLETED | OUTPATIENT
Start: 2019-05-22 | End: 2019-05-22

## 2019-05-22 RX ORDER — NITROFURANTOIN 25; 75 MG/1; MG/1
100 CAPSULE ORAL 2 TIMES DAILY
Qty: 20 CAPSULE | Refills: 0 | Status: SHIPPED | OUTPATIENT
Start: 2019-05-22 | End: 2019-06-01

## 2019-05-22 RX ADMIN — AZITHROMYCIN MONOHYDRATE 1000 MG: 500 TABLET ORAL at 13:45

## 2019-05-22 RX ADMIN — CEFTRIAXONE SODIUM 250 MG: 250 INJECTION, POWDER, FOR SOLUTION INTRAMUSCULAR; INTRAVENOUS at 13:45

## 2019-05-22 ASSESSMENT — ENCOUNTER SYMPTOMS
SHORTNESS OF BREATH: 0
DIARRHEA: 0
ABDOMINAL PAIN: 0
BACK PAIN: 0
SORE THROAT: 0
COUGH: 0
VOMITING: 0
NAUSEA: 0

## 2019-05-22 NOTE — ED TRIAGE NOTES
Pt reports that she wants a STD check denies any symptoms but reports that a past partner stated that they tested positive for some

## 2019-05-22 NOTE — ED PROVIDER NOTES
[address] [facility] ED  eMERGENCY dEPARTMENT eNCOUnter      Pt Name: Daisy Mitchell  MRN: [id number]  [place] 1995  Date of evaluation: 2019  Provider: GENO Walter CNP      HISTORY OF PRESENT ILLNESS    Daisy Mitchell is a 26 y.o. female who presents to the Emergency Department with fear of contracted STD. Patient states she denies any vaginal drainage, sores, abdominal pain, back pain or nausea. She states she came to get checked for an STD because her boyfriend said he was checked. REVIEW OF SYSTEMS       Review of Systems   Constitutional: Negative for activity change, appetite change and fever. HENT: Negative for congestion, ear pain and sore throat. Respiratory: Negative for cough and shortness of breath. Cardiovascular: Negative for chest pain. Gastrointestinal: Negative for abdominal pain, diarrhea, nausea and vomiting. Genitourinary: Negative for difficulty urinating, dysuria, flank pain, frequency, genital sores, hematuria, pelvic pain, urgency, vaginal discharge and vaginal pain. Musculoskeletal: Negative for arthralgias and back pain. Skin: Negative for rash. All other systems reviewed and are negative. PAST MEDICAL HISTORY     Past Medical History:   Diagnosis Date    Spontaneous           SURGICAL HISTORY     History reviewed. No pertinent surgical history. CURRENT MEDICATIONS       Previous Medications    BUTALBITAL-ASPIRIN-CAFFEINE (FIORINAL) -40 MG CAPSULE    Take 1 capsule by mouth every 4 hours as needed for Headaches for up to 3 days. VTTQMJ-WKVBI-QAHSX-METH-FA-DHA (PRENATE MINI) 18-0.6-0.4-350 MG CAPS    Take 1 tablet by mouth daily    PRENATAL VIT-FE FUMARATE-FA (PRENATAL VITAMINS) 28-0.8 MG TABS    Take 1 tablet by mouth daily       ALLERGIES     Patient has no known allergies.     FAMILY HISTORY       Family History   Problem Relation Age of Onset    Breast Cancer Neg Hx     Ovarian Cancer Neg Hx  Cervical Cancer Neg Hx     Uterine Cancer Neg Hx           SOCIAL HISTORY       Social History     Socioeconomic History    Marital status: Single     Spouse name: None    Number of children: None    Years of education: None    Highest education level: None   Occupational History    None   Social Needs    Financial resource strain: None    Food insecurity:     Worry: None     Inability: None    Transportation needs:     Medical: None     Non-medical: None   Tobacco Use    Smoking status: Never Smoker    Smokeless tobacco: Never Used   Substance and Sexual Activity    Alcohol use: No     Alcohol/week: 0.0 oz    Drug use: No    Sexual activity: Yes     Partners: Male   Lifestyle    Physical activity:     Days per week: None     Minutes per session: None    Stress: None   Relationships    Social connections:     Talks on phone: None     Gets together: None     Attends Gnosticism service: None     Active member of club or organization: None     Attends meetings of clubs or organizations: None     Relationship status: None    Intimate partner violence:     Fear of current or ex partner: None     Emotionally abused: None     Physically abused: None     Forced sexual activity: None   Other Topics Concern    None   Social History Narrative    None       SCREENINGS      @FLOW([id number])@      PHYSICAL EXAM    (up to 7 for level 4, 8 or more for level 5)     ED Triage Vitals [05/22/19 1308]   BP Temp Temp Source Pulse Resp SpO2 Height Weight   115/74 98.2 °F (36.8 °C) Temporal 88 20 98 % 5' 5\" (1.651 m) 160 lb (72.6 kg)       Physical Exam   Constitutional: She is oriented to person, place, and time. She appears well-developed and well-nourished. HENT:   Head: Normocephalic and atraumatic. Right Ear: External ear normal.   Left Ear: External ear normal.   Mouth/Throat: Oropharynx is clear and moist.   Eyes: Pupils are equal, round, and reactive to light.  Conjunctivae and EOM are normal.   Neck: Normal range of motion. Neck supple. Cardiovascular: Normal rate and regular rhythm. Pulmonary/Chest: Effort normal and breath sounds normal.   Abdominal: Soft. Bowel sounds are normal. She exhibits no distension. There is no tenderness. Musculoskeletal: Normal range of motion. Neurological: She is alert and oriented to person, place, and time. She has normal reflexes. Skin: Skin is warm and dry. Psychiatric: Judgment normal.   Nursing note and vitals reviewed. All other labs were within normal range or not returned as of this dictation. EMERGENCY DEPARTMENT COURSE and DIFFERENTIALDIAGNOSIS/MDM:   Vitals:    Vitals:    05/22/19 1308   BP: 115/74   Pulse: 88   Resp: 20   Temp: 98.2 °F (36.8 °C)   TempSrc: Temporal   SpO2: 98%   Weight: 160 lb (72.6 kg)   Height: 5' 5\" (1.651 m)            26 yr old female with UTI and fear of contracted STD. Prescription for Macrobid was given to the patient. F/U with PCP in 3 days for culture results. Patient verbalizes understanding. PROCEDURES:  Unless otherwise noted below, none     Procedures      FINAL IMPRESSION      1. Acute UTI    2.  Possible exposure to STD          DISPOSITION/PLAN   DISPOSITION Decision To Discharge 05/22/2019 02:30:28 PM          GENO Mast CNP (electronically signed)  Attending Emergency Physician      GENO Mast CNP  05/22/19 [id number]

## 2019-05-24 LAB
ORGANISM: ABNORMAL
URINE CULTURE, ROUTINE: ABNORMAL
URINE CULTURE, ROUTINE: ABNORMAL

## 2019-05-28 LAB
C. TRACHOMATIS DNA ,URINE: NEGATIVE
N. GONORRHOEAE DNA, URINE: NEGATIVE

## 2019-07-17 ENCOUNTER — HOSPITAL ENCOUNTER (EMERGENCY)
Age: 24
Discharge: HOME | End: 2019-07-17
Payer: COMMERCIAL

## 2019-07-17 ENCOUNTER — TELEPHONE (OUTPATIENT)
Dept: OBGYN CLINIC | Age: 24
End: 2019-07-17

## 2019-07-17 VITALS
SYSTOLIC BLOOD PRESSURE: 116 MMHG | BODY MASS INDEX: 28.35 KG/M2 | WEIGHT: 160 LBS | OXYGEN SATURATION: 98 % | RESPIRATION RATE: 16 BRPM | HEIGHT: 63 IN | TEMPERATURE: 98.6 F | HEART RATE: 91 BPM | DIASTOLIC BLOOD PRESSURE: 74 MMHG

## 2019-07-17 DIAGNOSIS — Z32.01 POSITIVE PREGNANCY TEST: Primary | ICD-10-CM

## 2019-07-17 LAB
HCG, URINE, POC: NEGATIVE
Lab: NORMAL
NEGATIVE QC PASS/FAIL: NORMAL
POSITIVE QC PASS/FAIL: NORMAL

## 2019-07-17 PROCEDURE — 99284 EMERGENCY DEPT VISIT MOD MDM: CPT

## 2019-07-17 NOTE — ED PROVIDER NOTES
[address] [facility] ED  EMERGENCY DEPARTMENT ENCOUNTER      Pt Name: Arian Guerrero  MRN: [id number]  [place] 1995  Date of evaluation: 2019  Provider: Lauren Nolan PA-C      HISTORY OF PRESENT ILLNESS    Arian Guerrero is a 22 y.o. female who presents to the Emergency Department with positive pregnancy test at home. Last menses was 2019. She admits to unprotected intercourse. She denies any vaginal discharge or bleeding or abdominal pain. No other complaints. She states she just wants confirmatory results from the emergency department of her pregnancy. She does have an OB/GYN. REVIEW OF SYSTEMS       Review of Systems   All other systems reviewed and are negative. PAST MEDICAL HISTORY     Past Medical History:   Diagnosis Date    Spontaneous           SURGICAL HISTORY     History reviewed. No pertinent surgical history. CURRENT MEDICATIONS       Previous Medications    BUTALBITAL-ASPIRIN-CAFFEINE (FIORINAL) -40 MG CAPSULE    Take 1 capsule by mouth every 4 hours as needed for Headaches for up to 3 days. DCDAIG-PNYWE-KOPZS-METH-FA-DHA (PRENATE MINI) 18-0.6-0.4-350 MG CAPS    Take 1 tablet by mouth daily    PRENATAL VIT-FE FUMARATE-FA (PRENATAL VITAMINS) 28-0.8 MG TABS    Take 1 tablet by mouth daily       ALLERGIES     Patient has no known allergies.     FAMILY HISTORY       Family History   Problem Relation Age of Onset    Breast Cancer Neg Hx     Ovarian Cancer Neg Hx     Cervical Cancer Neg Hx     Uterine Cancer Neg Hx           SOCIAL HISTORY       Social History     Socioeconomic History    Marital status: Single     Spouse name: None    Number of children: None    Years of education: None    Highest education level: None   Occupational History    None   Social Needs    Financial resource strain: None    Food insecurity:     Worry: None     Inability: None    Transportation needs:     Medical: None     Non-medical: None

## 2019-07-17 NOTE — TELEPHONE ENCOUNTER
Pt just found out, in the ER, that she is 5 wks pregnant. She wants to know if Dr Cathy Healy would call her in some prenatals.

## 2019-07-18 RX ORDER — PNV NO.95/FERROUS FUM/FOLIC AC 28MG-0.8MG
1 TABLET ORAL DAILY
Qty: 60 TABLET | Refills: 3 | Status: CANCELLED | OUTPATIENT
Start: 2019-07-18

## 2019-07-22 RX ORDER — PNV NO.95/FERROUS FUM/FOLIC AC 28MG-0.8MG
1 TABLET ORAL DAILY
Qty: 60 TABLET | Refills: 3 | Status: ON HOLD | OUTPATIENT
Start: 2019-07-22 | End: 2020-01-23 | Stop reason: HOSPADM

## 2019-07-31 ENCOUNTER — HOSPITAL ENCOUNTER (OUTPATIENT)
Dept: ULTRASOUND IMAGING | Age: 24
Discharge: HOME | End: 2019-08-02
Payer: COMMERCIAL

## 2019-07-31 DIAGNOSIS — Z34.81 ENCOUNTER FOR SUPERVISION OF OTHER NORMAL PREGNANCY, FIRST TRIMESTER: ICD-10-CM

## 2019-07-31 PROCEDURE — 76817 TRANSVAGINAL US OBSTETRIC: CPT

## 2019-07-31 PROCEDURE — 76801 OB US < 14 WKS SINGLE FETUS: CPT

## 2019-08-29 ENCOUNTER — HOSPITAL ENCOUNTER (OUTPATIENT)
Dept: ULTRASOUND IMAGING | Age: 24
Discharge: HOME | End: 2019-08-31
Payer: COMMERCIAL

## 2019-08-29 DIAGNOSIS — Z34.81 ENCOUNTER FOR SUPERVISION OF OTHER NORMAL PREGNANCY IN FIRST TRIMESTER: ICD-10-CM

## 2019-08-29 PROCEDURE — 76801 OB US < 14 WKS SINGLE FETUS: CPT

## 2019-10-15 ENCOUNTER — HOSPITAL ENCOUNTER (OUTPATIENT)
Dept: ULTRASOUND IMAGING | Age: 24
Discharge: HOME | End: 2019-10-17
Payer: COMMERCIAL

## 2019-10-15 DIAGNOSIS — Z34.82 ENCOUNTER FOR SUPERVISION OF OTHER NORMAL PREGNANCY IN SECOND TRIMESTER: ICD-10-CM

## 2019-10-15 PROCEDURE — 76805 OB US >/= 14 WKS SNGL FETUS: CPT

## 2019-12-17 ENCOUNTER — HOSPITAL ENCOUNTER (EMERGENCY)
Age: 24
Discharge: HOME | End: 2019-12-17
Payer: COMMERCIAL

## 2019-12-17 VITALS
HEART RATE: 74 BPM | BODY MASS INDEX: 28.35 KG/M2 | OXYGEN SATURATION: 96 % | HEIGHT: 63 IN | RESPIRATION RATE: 18 BRPM | DIASTOLIC BLOOD PRESSURE: 71 MMHG | WEIGHT: 160 LBS | TEMPERATURE: 98.5 F | SYSTOLIC BLOOD PRESSURE: 116 MMHG

## 2019-12-17 DIAGNOSIS — J10.1 INFLUENZA B: Primary | ICD-10-CM

## 2019-12-17 PROCEDURE — 99282 EMERGENCY DEPT VISIT SF MDM: CPT

## 2019-12-17 ASSESSMENT — ENCOUNTER SYMPTOMS
PHOTOPHOBIA: 0
VOMITING: 0
APNEA: 0
ABDOMINAL DISTENTION: 0
NAUSEA: 0
ANAL BLEEDING: 0
EYE DISCHARGE: 0
BACK PAIN: 0
VOICE CHANGE: 0
COUGH: 1

## 2019-12-17 ASSESSMENT — PAIN SCALES - GENERAL: PAINLEVEL_OUTOF10: 7

## 2019-12-17 ASSESSMENT — PAIN DESCRIPTION - PAIN TYPE: TYPE: ACUTE PAIN

## 2019-12-17 ASSESSMENT — PAIN DESCRIPTION - LOCATION: LOCATION: CHEST

## 2020-01-23 ENCOUNTER — HOSPITAL ENCOUNTER (OUTPATIENT)
Age: 25
Discharge: HOME | End: 2020-01-23
Attending: OBSTETRICS & GYNECOLOGY | Admitting: OBSTETRICS & GYNECOLOGY
Payer: COMMERCIAL

## 2020-01-23 ENCOUNTER — APPOINTMENT (OUTPATIENT)
Dept: ULTRASOUND IMAGING | Age: 25
End: 2020-01-23
Payer: COMMERCIAL

## 2020-01-23 VITALS
TEMPERATURE: 98.8 F | RESPIRATION RATE: 18 BRPM | HEART RATE: 91 BPM | SYSTOLIC BLOOD PRESSURE: 117 MMHG | DIASTOLIC BLOOD PRESSURE: 56 MMHG

## 2020-01-23 PROBLEM — Z31.69 ENCOUNTER FOR PRECONCEPTION CONSULTATION: Status: RESOLVED | Noted: 2018-07-11 | Resolved: 2020-01-23

## 2020-01-23 PROBLEM — Z30.42 DEPO-PROVERA CONTRACEPTIVE STATUS: Status: RESOLVED | Noted: 2017-12-07 | Resolved: 2020-01-23

## 2020-01-23 PROBLEM — A74.9 CHLAMYDIA: Status: RESOLVED | Noted: 2018-09-30 | Resolved: 2020-01-23

## 2020-01-23 PROBLEM — O03.9 SAB (SPONTANEOUS ABORTION): Status: RESOLVED | Noted: 2018-02-09 | Resolved: 2020-01-23

## 2020-01-23 LAB
ALBUMIN SERPL-MCNC: 3.4 G/DL (ref 3.5–4.6)
ALP BLD-CCNC: 81 U/L (ref 40–130)
ALT SERPL-CCNC: 7 U/L (ref 0–33)
AMPHETAMINE SCREEN, URINE: NORMAL
ANION GAP SERPL CALCULATED.3IONS-SCNC: 12 MEQ/L (ref 9–15)
AST SERPL-CCNC: 15 U/L (ref 0–35)
BACTERIA: NEGATIVE /HPF
BARBITURATE SCREEN URINE: NORMAL
BASOPHILS ABSOLUTE: 0 K/UL (ref 0–0.2)
BASOPHILS RELATIVE PERCENT: 0.3 %
BENZODIAZEPINE SCREEN, URINE: NORMAL
BILIRUB SERPL-MCNC: 0.4 MG/DL (ref 0.2–0.7)
BILIRUBIN URINE: NEGATIVE
BLOOD, URINE: NEGATIVE
BUN BLDV-MCNC: 3 MG/DL (ref 6–20)
CALCIUM SERPL-MCNC: 8.7 MG/DL (ref 8.5–9.9)
CANNABINOID SCREEN URINE: NORMAL
CHLORIDE BLD-SCNC: 106 MEQ/L (ref 95–107)
CLARITY: CLEAR
CO2: 19 MEQ/L (ref 20–31)
COCAINE METABOLITE SCREEN URINE: NORMAL
COLOR: YELLOW
CREAT SERPL-MCNC: 0.41 MG/DL (ref 0.5–0.9)
EOSINOPHILS ABSOLUTE: 0.1 K/UL (ref 0–0.7)
EOSINOPHILS RELATIVE PERCENT: 0.6 %
EPITHELIAL CELLS, UA: ABNORMAL /HPF (ref 0–5)
FETAL FIBRONECTIN: POSITIVE
GFR AFRICAN AMERICAN: >60
GFR NON-AFRICAN AMERICAN: >60
GLOBULIN: 2.9 G/DL (ref 2.3–3.5)
GLUCOSE BLD-MCNC: 83 MG/DL (ref 70–99)
GLUCOSE URINE: NEGATIVE MG/DL
HCT VFR BLD CALC: 33.6 % (ref 37–47)
HEMOGLOBIN: 11.4 G/DL (ref 12–16)
HYALINE CASTS: ABNORMAL /HPF (ref 0–5)
KETONES, URINE: NEGATIVE MG/DL
LEUKOCYTE ESTERASE, URINE: ABNORMAL
LYMPHOCYTES ABSOLUTE: 1.3 K/UL (ref 1–4.8)
LYMPHOCYTES RELATIVE PERCENT: 12.7 %
Lab: NORMAL
MCH RBC QN AUTO: 28.6 PG (ref 27–31.3)
MCHC RBC AUTO-ENTMCNC: 33.8 % (ref 33–37)
MCV RBC AUTO: 84.7 FL (ref 82–100)
METHADONE SCREEN, URINE: NORMAL
MONOCYTES ABSOLUTE: 0.7 K/UL (ref 0.2–0.8)
MONOCYTES RELATIVE PERCENT: 6.9 %
NEUTROPHILS ABSOLUTE: 8 K/UL (ref 1.4–6.5)
NEUTROPHILS RELATIVE PERCENT: 79.5 %
NITRITE, URINE: NEGATIVE
OPIATE SCREEN URINE: NORMAL
OXYCODONE URINE: NORMAL
PDW BLD-RTO: 13.6 % (ref 11.5–14.5)
PH UA: 7.5 (ref 5–9)
PHENCYCLIDINE SCREEN URINE: NORMAL
PLACENTA ALPHA MICROGLOBULIN-1: NEGATIVE
PLATELET # BLD: 174 K/UL (ref 130–400)
POTASSIUM SERPL-SCNC: 4.1 MEQ/L (ref 3.4–4.9)
PROPOXYPHENE SCREEN: NORMAL
PROTEIN UA: NEGATIVE MG/DL
RBC # BLD: 3.97 M/UL (ref 4.2–5.4)
RBC UA: ABNORMAL /HPF (ref 0–5)
SODIUM BLD-SCNC: 137 MEQ/L (ref 135–144)
SPECIFIC GRAVITY UA: 1.01 (ref 1–1.03)
TOTAL PROTEIN: 6.3 G/DL (ref 6.3–8)
UROBILINOGEN, URINE: 1 E.U./DL
WBC # BLD: 10.1 K/UL (ref 4.8–10.8)
WBC UA: ABNORMAL /HPF (ref 0–5)

## 2020-01-23 PROCEDURE — 85025 COMPLETE CBC W/AUTO DIFF WBC: CPT

## 2020-01-23 PROCEDURE — 6360000002 HC RX W HCPCS

## 2020-01-23 PROCEDURE — 36415 COLL VENOUS BLD VENIPUNCTURE: CPT

## 2020-01-23 PROCEDURE — 99282 EMERGENCY DEPT VISIT SF MDM: CPT | Performed by: OBSTETRICS & GYNECOLOGY

## 2020-01-23 PROCEDURE — 80307 DRUG TEST PRSMV CHEM ANLYZR: CPT

## 2020-01-23 PROCEDURE — 2580000003 HC RX 258: Performed by: OBSTETRICS & GYNECOLOGY

## 2020-01-23 PROCEDURE — 76817 TRANSVAGINAL US OBSTETRIC: CPT

## 2020-01-23 PROCEDURE — 84112 EVAL AMNIOTIC FLUID PROTEIN: CPT

## 2020-01-23 PROCEDURE — 99284 EMERGENCY DEPT VISIT MOD MDM: CPT

## 2020-01-23 PROCEDURE — 80053 COMPREHEN METABOLIC PANEL: CPT

## 2020-01-23 PROCEDURE — 76815 OB US LIMITED FETUS(S): CPT

## 2020-01-23 PROCEDURE — 82731 ASSAY OF FETAL FIBRONECTIN: CPT

## 2020-01-23 PROCEDURE — 96372 THER/PROPH/DIAG INJ SC/IM: CPT

## 2020-01-23 PROCEDURE — 87086 URINE CULTURE/COLONY COUNT: CPT

## 2020-01-23 PROCEDURE — 81001 URINALYSIS AUTO W/SCOPE: CPT

## 2020-01-23 RX ORDER — ACETAMINOPHEN 325 MG/1
650 TABLET ORAL EVERY 4 HOURS PRN
Status: DISCONTINUED | OUTPATIENT
Start: 2020-01-23 | End: 2020-01-23 | Stop reason: HOSPADM

## 2020-01-23 RX ORDER — TERBUTALINE SULFATE 1 MG/ML
INJECTION, SOLUTION SUBCUTANEOUS
Status: COMPLETED
Start: 2020-01-23 | End: 2020-01-23

## 2020-01-23 RX ORDER — SODIUM CHLORIDE, SODIUM LACTATE, POTASSIUM CHLORIDE, CALCIUM CHLORIDE 600; 310; 30; 20 MG/100ML; MG/100ML; MG/100ML; MG/100ML
INJECTION, SOLUTION INTRAVENOUS CONTINUOUS
Status: DISCONTINUED | OUTPATIENT
Start: 2020-01-23 | End: 2020-01-23 | Stop reason: HOSPADM

## 2020-01-23 RX ORDER — SODIUM CHLORIDE, SODIUM LACTATE, POTASSIUM CHLORIDE, CALCIUM CHLORIDE 600; 310; 30; 20 MG/100ML; MG/100ML; MG/100ML; MG/100ML
INJECTION, SOLUTION INTRAVENOUS
Status: DISCONTINUED
Start: 2020-01-23 | End: 2020-01-23 | Stop reason: HOSPADM

## 2020-01-23 RX ORDER — TERBUTALINE SULFATE 1 MG/ML
0.25 INJECTION, SOLUTION SUBCUTANEOUS ONCE
Status: COMPLETED | OUTPATIENT
Start: 2020-01-23 | End: 2020-01-23

## 2020-01-23 RX ADMIN — TERBUTALINE SULFATE 0.25 MG: 1 INJECTION, SOLUTION SUBCUTANEOUS at 13:25

## 2020-01-23 RX ADMIN — TERBUTALINE SULFATE 0.25 MG: 1 INJECTION SUBCUTANEOUS at 13:25

## 2020-01-23 RX ADMIN — SODIUM CHLORIDE, POTASSIUM CHLORIDE, SODIUM LACTATE AND CALCIUM CHLORIDE 500 ML: 600; 310; 30; 20 INJECTION, SOLUTION INTRAVENOUS at 11:09

## 2020-01-23 NOTE — PROGRESS NOTES
Pt to US at this time. Pt removed from monitor at this time. Will reapply monitor when pt returns from [address]

## 2020-01-23 NOTE — ED TRIAGE NOTES
Department of Obstetrics and Gynecology  Labor and Delivery   Triage Note      Pt Name: Hortensia Raya  MRN: [id number] Acct #: [de-identified]  YOB: 1995  Procedure Performed By: Maldonado Hernandez MD        SUBJECTIVE:  21 y/o [id number] female whose KLEBER was 3/18/20; now 32w1d gest and is brought to L&D by squad with c/o ctx's and leaking vag fluid earlier today; when initially queried about last sexual relations, she reported last night, but when I interviewed patient she changed tthis to 4 days ago; prior ob hx/o 1 FT vag del 3 years ago with Dr. Luis Causey that was uncomplicated; now receiving [facility] with Dr. Val Spring at [facility]; states she does plan to deliver again at [facility]; last ob sono was in 10/19 at 18 wks and no anomalies reported. ROS-no dysuria, vag bleeding or gross ROM. OBJECTIVE  She is at 32+ weeks gestation   Vitals:  /64   Pulse 82   Temp 98.6 °F (37 °C) (Oral)   Resp 16   LMP 2019 (Exact Date)     CONSTITUTIONAL:  awake, alert, cooperative, no apparent distress, and appears stated age  ABDOMEN:  Uterus gravid to 32 wks gest ( supple ). GENITAL/URINARY:  No labial lesions. Speculum: mucous d/c noted, but no sign of vaginitis or ROM ( FFN-done ). Cervix:             Dilation:  Closed         Effacement:  Long         Station:  -3 cm         Consistency:  medium         Position:  mid    Fetal Position:  Unknown. Membranes:  Intact    NST is reactive    Fetal heart rate:         Baseline Heart Rate:  140. Accelerations:  present       Decelerations:  none       Variability:  moderate    Contraction frequency: irregular ctx's.          ASSESSMENT & PLAN:    IUP at 32+ wks gest, ; c/o  ctx's and leaking fluid from vagina; patient was rx'd for chlamydia in 2017 by Dr. Luis Causey, but cx cultures were neg at [facility] with this pregnancy; amnisure is neg here today; cbc, ua and comp panel are ok; FFN is positive and f/u ob sono shows good interval

## 2020-01-25 LAB — URINE CULTURE, ROUTINE: NORMAL

## 2020-02-13 ENCOUNTER — TELEPHONE (OUTPATIENT)
Dept: OBGYN CLINIC | Age: 25
End: 2020-02-13

## 2020-02-13 NOTE — TELEPHONE ENCOUNTER
Pt asking if she can have a note for work to start her leave today or tomorrow. She stated when she came home from work yesterday she was so swollen and in pain. Pt asking if we can call her back today to let her know if she can pick this letter up.

## 2020-02-13 NOTE — TELEPHONE ENCOUNTER
Spoke to Dr. Jeffy Berrios and was advised to let pt know that she needs to call [facility] and get the letter at that office because that is where she is seen. We cannot give her the letter here at [facility]. Pt was notified and was told the last time she spoke to them they told her to call here. Pt was advised that when she calls [facility] she can tell them to give us a call here at the office and we will give them Dr. Najma Ashraf instructions.

## 2020-02-22 ENCOUNTER — HOSPITAL ENCOUNTER (OUTPATIENT)
Age: 25
Discharge: HOME | End: 2020-02-22
Attending: OBSTETRICS & GYNECOLOGY | Admitting: OBSTETRICS & GYNECOLOGY
Payer: COMMERCIAL

## 2020-02-22 VITALS
BODY MASS INDEX: 32.39 KG/M2 | SYSTOLIC BLOOD PRESSURE: 126 MMHG | HEIGHT: 62 IN | DIASTOLIC BLOOD PRESSURE: 65 MMHG | TEMPERATURE: 98.3 F | HEART RATE: 98 BPM | RESPIRATION RATE: 18 BRPM | WEIGHT: 176 LBS

## 2020-02-22 LAB
BACTERIA: ABNORMAL /HPF
BILIRUBIN URINE: NEGATIVE
BLOOD, URINE: NEGATIVE
CLARITY: ABNORMAL
COLOR: YELLOW
EPITHELIAL CELLS, UA: ABNORMAL /HPF (ref 0–5)
GLUCOSE URINE: NEGATIVE MG/DL
HYALINE CASTS: ABNORMAL /HPF (ref 0–5)
KETONES, URINE: ABNORMAL MG/DL
LEUKOCYTE ESTERASE, URINE: ABNORMAL
NITRITE, URINE: NEGATIVE
PH UA: 7.5 (ref 5–9)
PROTEIN UA: NEGATIVE MG/DL
RBC UA: ABNORMAL /HPF (ref 0–5)
SPECIFIC GRAVITY UA: 1.01 (ref 1–1.03)
UROBILINOGEN, URINE: 1 E.U./DL
WBC UA: ABNORMAL /HPF (ref 0–5)

## 2020-02-22 PROCEDURE — 87591 N.GONORRHOEAE DNA AMP PROB: CPT

## 2020-02-22 PROCEDURE — 87210 SMEAR WET MOUNT SALINE/INK: CPT

## 2020-02-22 PROCEDURE — 87808 TRICHOMONAS ASSAY W/OPTIC: CPT

## 2020-02-22 PROCEDURE — 87491 CHLMYD TRACH DNA AMP PROBE: CPT

## 2020-02-22 PROCEDURE — 99283 EMERGENCY DEPT VISIT LOW MDM: CPT

## 2020-02-22 PROCEDURE — 81001 URINALYSIS AUTO W/SCOPE: CPT

## 2020-02-23 ENCOUNTER — HOSPITAL ENCOUNTER (OUTPATIENT)
Age: 25
Discharge: HOME | End: 2020-02-23
Attending: OBSTETRICS & GYNECOLOGY | Admitting: OBSTETRICS & GYNECOLOGY
Payer: COMMERCIAL

## 2020-02-23 VITALS
RESPIRATION RATE: 20 BRPM | DIASTOLIC BLOOD PRESSURE: 56 MMHG | SYSTOLIC BLOOD PRESSURE: 100 MMHG | HEART RATE: 80 BPM | TEMPERATURE: 98.2 F

## 2020-02-23 LAB
CLUE CELLS: NORMAL
TRICHOMONAS PREP: NORMAL
TRICHOMONAS VAGINALIS SCREEN: NEGATIVE
YEAST WET PREP: NORMAL

## 2020-02-23 PROCEDURE — 87081 CULTURE SCREEN ONLY: CPT

## 2020-02-23 PROCEDURE — 59025 FETAL NON-STRESS TEST: CPT

## 2020-02-23 PROCEDURE — 59025 FETAL NON-STRESS TEST: CPT | Performed by: OBSTETRICS & GYNECOLOGY

## 2020-02-25 LAB — GROUP B STREP CULTURE: NORMAL

## 2020-02-27 LAB
C TRACH DNA GENITAL QL NAA+PROBE: NEGATIVE
N. GONORRHOEAE DNA: NEGATIVE

## 2020-03-01 ENCOUNTER — HOSPITAL ENCOUNTER (OUTPATIENT)
Age: 25
Discharge: HOME | End: 2020-03-02
Attending: OBSTETRICS & GYNECOLOGY | Admitting: OBSTETRICS & GYNECOLOGY
Payer: COMMERCIAL

## 2020-03-01 VITALS
TEMPERATURE: 98.8 F | HEART RATE: 97 BPM | DIASTOLIC BLOOD PRESSURE: 61 MMHG | SYSTOLIC BLOOD PRESSURE: 112 MMHG | RESPIRATION RATE: 16 BRPM

## 2020-03-01 LAB
AMPHETAMINE SCREEN, URINE: NORMAL
BARBITURATE SCREEN URINE: NORMAL
BENZODIAZEPINE SCREEN, URINE: NORMAL
BILIRUBIN URINE: NEGATIVE
BLOOD, URINE: NEGATIVE
CANNABINOID SCREEN URINE: NORMAL
CLARITY: CLEAR
COCAINE METABOLITE SCREEN URINE: NORMAL
COLOR: YELLOW
GLUCOSE URINE: NEGATIVE MG/DL
KETONES, URINE: NEGATIVE MG/DL
LEUKOCYTE ESTERASE, URINE: NEGATIVE
Lab: NORMAL
METHADONE SCREEN, URINE: NORMAL
NITRITE, URINE: NEGATIVE
OPIATE SCREEN URINE: NORMAL
OXYCODONE URINE: NORMAL
PH UA: 7 (ref 5–9)
PHENCYCLIDINE SCREEN URINE: NORMAL
PROPOXYPHENE SCREEN: NORMAL
PROTEIN UA: ABNORMAL MG/DL
SPECIFIC GRAVITY UA: 1.02 (ref 1–1.03)
UROBILINOGEN, URINE: 1 E.U./DL

## 2020-03-01 PROCEDURE — 80307 DRUG TEST PRSMV CHEM ANLYZR: CPT

## 2020-03-01 PROCEDURE — 81003 URINALYSIS AUTO W/O SCOPE: CPT

## 2020-03-02 PROBLEM — O47.1 FALSE LABOR AFTER 37 COMPLETED WEEKS OF GESTATION: Status: ACTIVE | Noted: 2020-03-02

## 2020-03-02 PROCEDURE — 59025 FETAL NON-STRESS TEST: CPT

## 2020-03-02 PROCEDURE — 99213 OFFICE O/P EST LOW 20 MIN: CPT | Performed by: OBSTETRICS & GYNECOLOGY

## 2020-03-02 PROCEDURE — 99283 EMERGENCY DEPT VISIT LOW MDM: CPT

## 2020-03-02 PROCEDURE — 59025 FETAL NON-STRESS TEST: CPT | Performed by: OBSTETRICS & GYNECOLOGY

## 2020-03-02 NOTE — PROGRESS NOTES
Department of Obstetrics and Gynecology  Labor and Delivery  Attending Triage Note      SUBJECTIVE:  22 y.o.  Jt Sandhu @ 37w5d  Presents with complaints of cramping and pressure similar to needing to go to the bathroom . No dysuria. Had normal soft BM around 6 PM today. No bleeding or leaking fluid,. Getting harder to sleep and find comfortable positions. No abdominal trauma. Safe, denies abuse . Chief Complaint   Patient presents with    Contractions     began aronud 940 this evening       Fetal Movement    Yes  ROM No  Vaginal Bleeding No  Contractions Yes    OBJECTIVE    Vitals:  /61   Pulse 97   Temp 98.8 °F (37.1 °C) (Oral)   Resp 16   LMP 06/12/2019 (Exact Date)     Review of systems negative for fever, chills, nausea vomiting, diarrhea, visual changes, upper quadrant pain, dysuria, back pain, increased swelling, shortness of breath, chest pain, or other symptoms. Cervix:  Examine per Ramon Florez RN X 2 unchanged after more than 1 hour           Dilation:  2 cm         Effacement:  50%         Station:  -5 cm         Consistency:  medium         Position:  mid        Membranes:  Intact    Fetal heart rate:         Baseline Heart Rate:  Strip reviewed Category 1, Reactive. Baseline  120       Accelerations:  present       Decelerations:  none       Variability:  moderate    Contraction frequency: occasional mild        DATA:  Labs Reviewed   URINALYSIS - Abnormal; Notable for the following components:       Result Value    Protein, UA TRACE (*)     All other components within normal limits   URINE DRUG SCREEN       ASSESSMENT & PLAN:   1) IUP at 37w5d weeks GBS NEGATIVE Not in labor. Reassured. Discussed comfort measures and reasons to return to hospital sooner.    2) D/C to home in good condition with routine precautions  3) Keep appointment with OB provider Dr. Gill Olivarez in 2 days    Jodie Hamilton

## 2020-03-11 ENCOUNTER — HOSPITAL ENCOUNTER (INPATIENT)
Age: 25
LOS: 2 days | Discharge: HOME | DRG: 560 | End: 2020-03-13
Attending: OBSTETRICS & GYNECOLOGY | Admitting: OBSTETRICS & GYNECOLOGY
Payer: COMMERCIAL

## 2020-03-11 PROBLEM — Z37.9 NORMAL LABOR: Status: ACTIVE | Noted: 2020-03-11

## 2020-03-11 LAB
ABO/RH: NORMAL
ALBUMIN SERPL-MCNC: 3 G/DL (ref 3.5–4.6)
ALP BLD-CCNC: 120 U/L (ref 40–130)
ALT SERPL-CCNC: 10 U/L (ref 0–33)
AMPHETAMINE SCREEN, URINE: NORMAL
ANION GAP SERPL CALCULATED.3IONS-SCNC: 14 MEQ/L (ref 9–15)
ANTIBODY SCREEN: NORMAL
AST SERPL-CCNC: 19 U/L (ref 0–35)
BARBITURATE SCREEN URINE: NORMAL
BASOPHILS ABSOLUTE: 0 K/UL (ref 0–0.2)
BASOPHILS RELATIVE PERCENT: 0.2 %
BENZODIAZEPINE SCREEN, URINE: NORMAL
BILIRUB SERPL-MCNC: 0.4 MG/DL (ref 0.2–0.7)
BILIRUBIN URINE: NEGATIVE
BLOOD, URINE: NEGATIVE
BUN BLDV-MCNC: 5 MG/DL (ref 6–20)
CALCIUM SERPL-MCNC: 8.5 MG/DL (ref 8.5–9.9)
CANNABINOID SCREEN URINE: NORMAL
CHLORIDE BLD-SCNC: 106 MEQ/L (ref 95–107)
CLARITY: CLEAR
CO2: 16 MEQ/L (ref 20–31)
COCAINE METABOLITE SCREEN URINE: NORMAL
COLOR: YELLOW
CREAT SERPL-MCNC: 0.36 MG/DL (ref 0.5–0.9)
EOSINOPHILS ABSOLUTE: 0 K/UL (ref 0–0.7)
EOSINOPHILS RELATIVE PERCENT: 0.2 %
GFR AFRICAN AMERICAN: >60
GFR NON-AFRICAN AMERICAN: >60
GLOBULIN: 3.4 G/DL (ref 2.3–3.5)
GLUCOSE BLD-MCNC: 67 MG/DL (ref 70–99)
GLUCOSE URINE: NEGATIVE MG/DL
HCT VFR BLD CALC: 33.2 % (ref 37–47)
HEMOGLOBIN: 10.9 G/DL (ref 12–16)
HEPATITIS B SURFACE ANTIGEN INTERPRETATION: NORMAL
KETONES, URINE: NEGATIVE MG/DL
LEUKOCYTE ESTERASE, URINE: NEGATIVE
LYMPHOCYTES ABSOLUTE: 1.1 K/UL (ref 1–4.8)
LYMPHOCYTES RELATIVE PERCENT: 14 %
Lab: NORMAL
MCH RBC QN AUTO: 26.4 PG (ref 27–31.3)
MCHC RBC AUTO-ENTMCNC: 32.7 % (ref 33–37)
MCV RBC AUTO: 80.8 FL (ref 82–100)
METHADONE SCREEN, URINE: NORMAL
MONOCYTES ABSOLUTE: 0.6 K/UL (ref 0.2–0.8)
MONOCYTES RELATIVE PERCENT: 7.7 %
NEUTROPHILS ABSOLUTE: 5.8 K/UL (ref 1.4–6.5)
NEUTROPHILS RELATIVE PERCENT: 77.9 %
NITRITE, URINE: NEGATIVE
OPIATE SCREEN URINE: NORMAL
OXYCODONE URINE: NORMAL
PDW BLD-RTO: 15.5 % (ref 11.5–14.5)
PH UA: 6.5 (ref 5–9)
PHENCYCLIDINE SCREEN URINE: NORMAL
PLATELET # BLD: 194 K/UL (ref 130–400)
POTASSIUM SERPL-SCNC: 4.1 MEQ/L (ref 3.4–4.9)
PROPOXYPHENE SCREEN: NORMAL
PROTEIN UA: NEGATIVE MG/DL
RBC # BLD: 4.11 M/UL (ref 4.2–5.4)
SODIUM BLD-SCNC: 136 MEQ/L (ref 135–144)
SPECIFIC GRAVITY UA: 1.02 (ref 1–1.03)
TOTAL PROTEIN: 6.4 G/DL (ref 6.3–8)
UROBILINOGEN, URINE: 1 E.U./DL
WBC # BLD: 7.5 K/UL (ref 4.8–10.8)

## 2020-03-11 PROCEDURE — 80053 COMPREHEN METABOLIC PANEL: CPT

## 2020-03-11 PROCEDURE — 2580000003 HC RX 258: Performed by: OBSTETRICS & GYNECOLOGY

## 2020-03-11 PROCEDURE — 6360000002 HC RX W HCPCS: Performed by: OBSTETRICS & GYNECOLOGY

## 2020-03-11 PROCEDURE — 86901 BLOOD TYPING SEROLOGIC RH(D): CPT

## 2020-03-11 PROCEDURE — 86592 SYPHILIS TEST NON-TREP QUAL: CPT

## 2020-03-11 PROCEDURE — 36415 COLL VENOUS BLD VENIPUNCTURE: CPT

## 2020-03-11 PROCEDURE — 87340 HEPATITIS B SURFACE AG IA: CPT

## 2020-03-11 PROCEDURE — 81003 URINALYSIS AUTO W/O SCOPE: CPT

## 2020-03-11 PROCEDURE — 85025 COMPLETE CBC W/AUTO DIFF WBC: CPT

## 2020-03-11 PROCEDURE — 86850 RBC ANTIBODY SCREEN: CPT

## 2020-03-11 PROCEDURE — 80307 DRUG TEST PRSMV CHEM ANLYZR: CPT

## 2020-03-11 PROCEDURE — 86900 BLOOD TYPING SEROLOGIC ABO: CPT

## 2020-03-11 PROCEDURE — 1220000000 HC SEMI PRIVATE OB R&B

## 2020-03-11 RX ORDER — SODIUM CHLORIDE 0.9 % (FLUSH) 0.9 %
10 SYRINGE (ML) INJECTION PRN
Status: DISCONTINUED | OUTPATIENT
Start: 2020-03-11 | End: 2020-03-12

## 2020-03-11 RX ORDER — SODIUM CHLORIDE, SODIUM LACTATE, POTASSIUM CHLORIDE, CALCIUM CHLORIDE 600; 310; 30; 20 MG/100ML; MG/100ML; MG/100ML; MG/100ML
INJECTION, SOLUTION INTRAVENOUS CONTINUOUS
Status: DISCONTINUED | OUTPATIENT
Start: 2020-03-11 | End: 2020-03-12

## 2020-03-11 RX ORDER — DOCUSATE SODIUM 100 MG/1
100 CAPSULE, LIQUID FILLED ORAL 2 TIMES DAILY PRN
Status: DISCONTINUED | OUTPATIENT
Start: 2020-03-11 | End: 2020-03-12

## 2020-03-11 RX ORDER — ACETAMINOPHEN 325 MG/1
650 TABLET ORAL EVERY 4 HOURS PRN
Status: DISCONTINUED | OUTPATIENT
Start: 2020-03-11 | End: 2020-03-12

## 2020-03-11 RX ORDER — ONDANSETRON 2 MG/ML
4 INJECTION INTRAMUSCULAR; INTRAVENOUS EVERY 6 HOURS PRN
Status: DISCONTINUED | OUTPATIENT
Start: 2020-03-11 | End: 2020-03-12

## 2020-03-11 RX ORDER — DIPHENHYDRAMINE HCL 25 MG
25 TABLET ORAL EVERY 4 HOURS PRN
Status: DISCONTINUED | OUTPATIENT
Start: 2020-03-11 | End: 2020-03-12

## 2020-03-11 RX ORDER — SODIUM CHLORIDE 0.9 % (FLUSH) 0.9 %
10 SYRINGE (ML) INJECTION EVERY 12 HOURS SCHEDULED
Status: DISCONTINUED | OUTPATIENT
Start: 2020-03-11 | End: 2020-03-12

## 2020-03-11 RX ADMIN — SODIUM CHLORIDE, POTASSIUM CHLORIDE, SODIUM LACTATE AND CALCIUM CHLORIDE: 600; 310; 30; 20 INJECTION, SOLUTION INTRAVENOUS at 21:05

## 2020-03-11 RX ADMIN — Medication 1 MILLI-UNITS/MIN: at 17:03

## 2020-03-11 RX ADMIN — SODIUM CHLORIDE, POTASSIUM CHLORIDE, SODIUM LACTATE AND CALCIUM CHLORIDE 999 ML/HR: 600; 310; 30; 20 INJECTION, SOLUTION INTRAVENOUS at 16:22

## 2020-03-11 NOTE — FLOWSHEET NOTE
Peanut ball provided to patient. Teaching for use of  done with patient.  Patient verbalizes understanding

## 2020-03-12 ENCOUNTER — ANESTHESIA (OUTPATIENT)
Dept: LABOR AND DELIVERY | Age: 25
DRG: 560 | End: 2020-03-12
Payer: COMMERCIAL

## 2020-03-12 ENCOUNTER — ANESTHESIA EVENT (OUTPATIENT)
Dept: LABOR AND DELIVERY | Age: 25
DRG: 560 | End: 2020-03-12
Payer: COMMERCIAL

## 2020-03-12 LAB — RPR: NORMAL

## 2020-03-12 PROCEDURE — 6360000002 HC RX W HCPCS: Performed by: OBSTETRICS & GYNECOLOGY

## 2020-03-12 PROCEDURE — 3E033VJ INTRODUCTION OF OTHER HORMONE INTO PERIPHERAL VEIN, PERCUTANEOUS APPROACH: ICD-10-PCS | Performed by: OBSTETRICS & GYNECOLOGY

## 2020-03-12 PROCEDURE — 59409 OBSTETRICAL CARE: CPT | Performed by: OBSTETRICS & GYNECOLOGY

## 2020-03-12 PROCEDURE — 99223 1ST HOSP IP/OBS HIGH 75: CPT | Performed by: OBSTETRICS & GYNECOLOGY

## 2020-03-12 PROCEDURE — 2580000003 HC RX 258: Performed by: OBSTETRICS & GYNECOLOGY

## 2020-03-12 PROCEDURE — 6370000000 HC RX 637 (ALT 250 FOR IP): Performed by: OBSTETRICS & GYNECOLOGY

## 2020-03-12 PROCEDURE — 2500000003 HC RX 250 WO HCPCS: Performed by: OBSTETRICS & GYNECOLOGY

## 2020-03-12 PROCEDURE — 1220000000 HC SEMI PRIVATE OB R&B

## 2020-03-12 PROCEDURE — 3700000025 EPIDURAL BLOCK: Performed by: NURSE ANESTHETIST, CERTIFIED REGISTERED

## 2020-03-12 PROCEDURE — 59025 FETAL NON-STRESS TEST: CPT | Performed by: OBSTETRICS & GYNECOLOGY

## 2020-03-12 PROCEDURE — 2500000003 HC RX 250 WO HCPCS: Performed by: NURSE ANESTHETIST, CERTIFIED REGISTERED

## 2020-03-12 RX ORDER — SODIUM CHLORIDE 0.9 % (FLUSH) 0.9 %
10 SYRINGE (ML) INJECTION EVERY 12 HOURS SCHEDULED
Status: DISCONTINUED | OUTPATIENT
Start: 2020-03-12 | End: 2020-03-13 | Stop reason: HOSPADM

## 2020-03-12 RX ORDER — HYDROCODONE BITARTRATE AND ACETAMINOPHEN 5; 325 MG/1; MG/1
1 TABLET ORAL EVERY 4 HOURS PRN
Status: DISCONTINUED | OUTPATIENT
Start: 2020-03-12 | End: 2020-03-13 | Stop reason: HOSPADM

## 2020-03-12 RX ORDER — IBUPROFEN 800 MG/1
800 TABLET ORAL EVERY 8 HOURS
Status: DISCONTINUED | OUTPATIENT
Start: 2020-03-12 | End: 2020-03-13 | Stop reason: HOSPADM

## 2020-03-12 RX ORDER — SODIUM CHLORIDE 0.9 % (FLUSH) 0.9 %
10 SYRINGE (ML) INJECTION PRN
Status: DISCONTINUED | OUTPATIENT
Start: 2020-03-12 | End: 2020-03-13 | Stop reason: HOSPADM

## 2020-03-12 RX ORDER — NALOXONE HYDROCHLORIDE 0.4 MG/ML
0.4 INJECTION, SOLUTION INTRAMUSCULAR; INTRAVENOUS; SUBCUTANEOUS PRN
Status: DISCONTINUED | OUTPATIENT
Start: 2020-03-12 | End: 2020-03-12

## 2020-03-12 RX ORDER — ONDANSETRON 2 MG/ML
4 INJECTION INTRAMUSCULAR; INTRAVENOUS EVERY 6 HOURS PRN
Status: DISCONTINUED | OUTPATIENT
Start: 2020-03-12 | End: 2020-03-12 | Stop reason: SDUPTHER

## 2020-03-12 RX ORDER — SODIUM CHLORIDE, SODIUM LACTATE, POTASSIUM CHLORIDE, CALCIUM CHLORIDE 600; 310; 30; 20 MG/100ML; MG/100ML; MG/100ML; MG/100ML
INJECTION, SOLUTION INTRAVENOUS CONTINUOUS
Status: DISCONTINUED | OUTPATIENT
Start: 2020-03-12 | End: 2020-03-13 | Stop reason: HOSPADM

## 2020-03-12 RX ORDER — NALBUPHINE HCL 10 MG/ML
5 AMPUL (ML) INJECTION EVERY 4 HOURS PRN
Status: DISCONTINUED | OUTPATIENT
Start: 2020-03-12 | End: 2020-03-12 | Stop reason: RX

## 2020-03-12 RX ORDER — FAMOTIDINE 20 MG/1
20 TABLET, FILM COATED ORAL 2 TIMES DAILY
Status: DISCONTINUED | OUTPATIENT
Start: 2020-03-12 | End: 2020-03-13 | Stop reason: HOSPADM

## 2020-03-12 RX ORDER — ONDANSETRON 4 MG/1
8 TABLET, FILM COATED ORAL EVERY 8 HOURS PRN
Status: DISCONTINUED | OUTPATIENT
Start: 2020-03-12 | End: 2020-03-13 | Stop reason: HOSPADM

## 2020-03-12 RX ORDER — LIDOCAINE HYDROCHLORIDE AND EPINEPHRINE 15; 5 MG/ML; UG/ML
INJECTION, SOLUTION EPIDURAL PRN
Status: DISCONTINUED | OUTPATIENT
Start: 2020-03-12 | End: 2020-03-12 | Stop reason: SDUPTHER

## 2020-03-12 RX ORDER — DOCUSATE SODIUM 100 MG/1
100 CAPSULE, LIQUID FILLED ORAL 2 TIMES DAILY
Status: DISCONTINUED | OUTPATIENT
Start: 2020-03-12 | End: 2020-03-13 | Stop reason: HOSPADM

## 2020-03-12 RX ORDER — LANOLIN 100 %
OINTMENT (GRAM) TOPICAL PRN
Status: DISCONTINUED | OUTPATIENT
Start: 2020-03-12 | End: 2020-03-13 | Stop reason: HOSPADM

## 2020-03-12 RX ORDER — HYDROCODONE BITARTRATE AND ACETAMINOPHEN 5; 325 MG/1; MG/1
2 TABLET ORAL EVERY 4 HOURS PRN
Status: DISCONTINUED | OUTPATIENT
Start: 2020-03-12 | End: 2020-03-13 | Stop reason: HOSPADM

## 2020-03-12 RX ADMIN — HYDROCODONE BITARTRATE AND ACETAMINOPHEN 1 TABLET: 5; 325 TABLET ORAL at 23:34

## 2020-03-12 RX ADMIN — DOCUSATE SODIUM 100 MG: 100 CAPSULE, LIQUID FILLED ORAL at 16:45

## 2020-03-12 RX ADMIN — Medication 12 ML/HR: at 05:35

## 2020-03-12 RX ADMIN — SODIUM CHLORIDE, POTASSIUM CHLORIDE, SODIUM LACTATE AND CALCIUM CHLORIDE: 600; 310; 30; 20 INJECTION, SOLUTION INTRAVENOUS at 04:03

## 2020-03-12 RX ADMIN — Medication 10 ML: at 10:28

## 2020-03-12 RX ADMIN — IBUPROFEN 800 MG: 800 TABLET, FILM COATED ORAL at 16:44

## 2020-03-12 RX ADMIN — LIDOCAINE HYDROCHLORIDE AND EPINEPHRINE 3 ML: 15; 5 INJECTION, SOLUTION EPIDURAL at 05:31

## 2020-03-12 RX ADMIN — BUTORPHANOL TARTRATE 1 MG: 2 INJECTION, SOLUTION INTRAMUSCULAR; INTRAVENOUS at 00:03

## 2020-03-12 ASSESSMENT — PAIN DESCRIPTION - DESCRIPTORS
DESCRIPTORS: CRAMPING
DESCRIPTORS: CRAMPING

## 2020-03-12 ASSESSMENT — PAIN SCALES - GENERAL
PAINLEVEL_OUTOF10: 6
PAINLEVEL_OUTOF10: 5
PAINLEVEL_OUTOF10: 0
PAINLEVEL_OUTOF10: 6
PAINLEVEL_OUTOF10: 0
PAINLEVEL_OUTOF10: 8
PAINLEVEL_OUTOF10: 0

## 2020-03-12 ASSESSMENT — PAIN DESCRIPTION - FREQUENCY
FREQUENCY: INTERMITTENT
FREQUENCY: INTERMITTENT

## 2020-03-12 ASSESSMENT — PAIN DESCRIPTION - LOCATION
LOCATION: ABDOMEN
LOCATION: ABDOMEN

## 2020-03-12 ASSESSMENT — PAIN DESCRIPTION - PAIN TYPE
TYPE: ACUTE PAIN
TYPE: ACUTE PAIN

## 2020-03-12 ASSESSMENT — PAIN DESCRIPTION - ORIENTATION
ORIENTATION: LOWER
ORIENTATION: LOWER

## 2020-03-12 NOTE — ANESTHESIA POSTPROCEDURE EVALUATION
Department of Anesthesiology  Postprocedure Note    Patient: Vazquez Marcelo  MRN: [id number]  YOB: 1995  Date of evaluation: 3/12/2020  Time:  8:59 AM     Procedure Summary     Date:  03/12/20 Room / Location:      Anesthesia Start:  0520 Anesthesia Stop:  0810    Procedure:  Labor Analgesia Diagnosis:      Scheduled Providers:   Responsible Provider:  GENO Ling CRNA    Anesthesia Type:  epidural ASA Status:  2          Anesthesia Type: epidural    Delia Phase I: Delia Score: 9    Delia Phase II:      Last vitals: Reviewed and per EMR flowsheets.        Anesthesia Post Evaluation    Patient location during evaluation: bedside  Patient participation: complete - patient participated  Level of consciousness: awake  Airway patency: patent  Nausea & Vomiting: no nausea and no vomiting  Complications: no  Cardiovascular status: blood pressure returned to baseline  Respiratory status: acceptable  Hydration status: euvolemic

## 2020-03-12 NOTE — PLAN OF CARE
Problem: VAGINAL DELIVERY - RECOVERY AND POST PARTUM  Goal: Vital signs are medically acceptable  3/12/2020 1832 by Jenny Flores RN  Outcome: Met This Shift  3/12/2020 1314 by Leland Wagner RN  Outcome: Ongoing  Goal: Empties bladder  3/12/2020 1832 by Jenny Flores RN  Outcome: Met This Shift  3/12/2020 1314 by Leland Wagner RN  Outcome: Ongoing  Goal: Edema will be absent or minimal  3/12/2020 1832 by Jenny Flores RN  Outcome: Met This Shift  3/12/2020 1314 by Leland Wagner RN  Outcome: Ongoing  Goal: Appropriate behavior observed  3/12/2020 1832 by Jenny Flores RN  Outcome: Met This Shift  3/12/2020 1314 by Leland Wagner RN  Outcome: Ongoing  Goal: Positive Mother-Baby interactions are observed  3/12/2020 1832 by Jenny Flores RN  Outcome: Met This Shift  3/12/2020 1314 by Leland Wagner RN  Outcome: Ongoing     Problem: PAIN  Goal: Patient's pain/discomfort is manageable  3/12/2020 1832 by Jenny Flores RN  Outcome: Met This Shift  3/12/2020 1314 by Leland Wagner RN  Outcome: Ongoing

## 2020-03-12 NOTE — FLOWSHEET NOTE
Phoned Dr Leti Roth to update him about pt. Will pass on to the next shift that he would like the in house coming on to rupture her.

## 2020-03-12 NOTE — ANESTHESIA PRE PROCEDURE
Department of Anesthesiology  Preprocedure Note       Name:  Ross Jacobo   Age:  22 y.o.  :  1995                                          MRN:  [id number]         Date:  3/12/2020      Surgeon: * No surgeons listed *    Procedure: Labor Analgesia    Medications prior to admission:   Prior to Admission medications    Medication Sig Start Date End Date Taking?  Authorizing Provider   Raridg-EeCfh-CeFbe-Meth-FA-DHA (PRENATE MINI) 18-0.6-0.4-350 MG CAPS Take 1 tablet by mouth daily 18  Yes Yaquelin Cosby MD       Current medications:    Current Facility-Administered Medications   Medication Dose Route Frequency Provider Last Rate Last Dose    naloxone (NARCAN) injection 0.4 mg  0.4 mg Intravenous PRN Ori Benson, DO        nalbuphine (NUBAIN) injection 5 mg  5 mg Intravenous Q4H PRN Cherelle Yanez, DO        ondansetron [facility]) injection 4 mg  4 mg Intravenous Q6H PRN Cherelle Yanez, DO        fentaNYL 125 mcg, ropivacaine 0.2% in sodium chloride 0.9% 127.5ml (OB) epidural  12 mL/hr Epidural Continuous Ori [place], DO        lactated ringers infusion   Intravenous Continuous Ori Benson,  mL/hr at 20 0403      sodium chloride flush 0.9 % injection 10 mL  10 mL Intravenous 2 times per day Ori [place], DO        sodium chloride flush 0.9 % injection 10 mL  10 mL Intravenous PRN Ori [place], DO        acetaminophen (TYLENOL) tablet 650 mg  650 mg Oral Q4H PRN Ori Benson, DO        oxytocin (PITOCIN) 30 units in 500 mL infusion  1 gay-units/min Intravenous Continuous Ori Benson, DO 18 mL/hr at 20 0210 18 gay-units/min at 20 0210    diphenhydrAMINE (BENADRYL) tablet 25 mg  25 mg Oral Q4H PRN Ori [place], DO        docusate sodium (COLACE) capsule 100 mg  100 mg Oral BID PRN Cherelle Yanez, DO        [facility]dansetron [facility]) injection 4 mg  4 mg Intravenous Q6H PRN Ori Benson, DO        oxytocin (PITOCIN) 30 units in 500 mL infusion  1 gay-units/min Intravenous Continuous PRN Elenor Wolf Yanez,         benzocaine-menthol (DERMOPLAST) 20-0.5 % spray   Topical PRN Con Burk DO        butorphanol (STADOL) injection 1 mg  1 mg Intravenous Q3H PRN Condeb Burk, DO   1 mg at 20 0003       Allergies:  No Known Allergies    Problem List:    Patient Active Problem List   Diagnosis Code     uterine contractions, antepartum, third trimester O47.03    Positive fetal fibronectin at 22 weeks to 34 weeks gestation O09.899, R87.89    32 weeks gestation of pregnancy Z3A.32    Premature uterine contractions O47.9    36 weeks gestation of pregnancy Z3A.36    False labor after 37 completed weeks of gestation O48.3    Normal labor O80, Z37.9       Past Medical History:        Diagnosis Date    Postpartum depression     Spontaneous         Past Surgical History:  No past surgical history on file.     Social History:    Social History     Tobacco Use    Smoking status: Never Smoker    Smokeless tobacco: Never Used   Substance Use Topics    Alcohol use: No     Alcohol/week: 0.0 standard drinks                                Counseling given: Not Answered      Vital Signs (Current):   Vitals:    20 0153 20 0219 20 0321 20 0419   BP: (!) 105/55 (!) 105/59 (!) 103/57 (!) 116/59   Pulse: 72 71 68 71   Resp: 16 16  16   Temp:   36.7 °C (98 °F)    TempSrc:       Weight:       Height:                                                  BP Readings from Last 3 Encounters:   20 (!) 116/59   20 112/61   20 (!) 100/56       NPO Status: Time of last liquid consumption: 1604                        Time of last solid consumption:                         Date of last liquid consumption: 20                        Date of last solid food consumption: 03/10/20    BMI:   Wt Readings from Last 3 Encounters:   20 187 lb (84.8 kg)   20 176 lb (79.8 kg)

## 2020-03-12 NOTE — PLAN OF CARE
Problem: Anxiety:  Goal: Level of anxiety will decrease  Description: Level of anxiety will decrease  3/12/2020 1832 by Luciana Bloom RN  Outcome: Met This Shift  3/12/2020 1314 by Viridiana Malagon RN  Outcome: Ongoing  3/12/2020 0727 by Viridiana Malagon RN  Outcome: Ongoing     Problem: Breathing Pattern - Ineffective:  Goal: Able to breathe comfortably  Description: Able to breathe comfortably  3/12/2020 1832 by Luciana Bloom RN  Outcome: Met This Shift  3/12/2020 1314 by Viridiana Malagon RN  Outcome: Ongoing  3/12/2020 0727 by Viridiana Malagon RN  Outcome: Ongoing     Problem: Pain - Acute:  Goal: Pain level will decrease  Description: Pain level will decrease  3/12/2020 1832 by Luciana Bloom RN  Outcome: Met This Shift  3/12/2020 1314 by Viridiana Malagon RN  Outcome: Ongoing  3/12/2020 0727 by Viridiana Malagon RN  Outcome: Ongoing  Goal: Able to cope with pain  Description: Able to cope with pain  3/12/2020 1832 by Luciana Bloom RN  Outcome: Met This Shift  3/12/2020 1314 by Viridiana Malagon RN  Outcome: Ongoing  3/12/2020 0727 by Viridiana Malagon RN  Outcome: Ongoing     Problem: Pain:  Description: Pain management should include both nonpharmacologic and pharmacologic interventions.   Goal: Pain level will decrease  Description: Pain level will decrease  3/12/2020 1832 by Luciana Bloom RN  Outcome: Met This Shift  3/12/2020 1314 by Viridiana Malagon RN  Outcome: Ongoing  3/12/2020 0727 by Viridiana Malagon RN  Outcome: Ongoing     Problem: VAGINAL DELIVERY - RECOVERY AND POST PARTUM  Goal: Vital signs are medically acceptable  3/12/2020 1832 by Luciana Bloom RN  Outcome: Met This Shift  3/12/2020 1314 by Viridiana Malagon RN  Outcome: Ongoing  Goal: Empties bladder  3/12/2020 1832 by Luciana Bloom RN  Outcome: Met This Shift  3/12/2020 1314 by Viridiana Malagon RN  Outcome: Ongoing  Goal: Edema will be absent or minimal  3/12/2020 1832 by Latoya Church Daria Angeles RN  Outcome: Met This Shift  3/12/2020 1314 by Mitzi Henriquez RN  Outcome: Ongoing  Goal: Appropriate behavior observed  3/12/2020 1832 by Swapna Ovalles RN  Outcome: Met This Shift  3/12/2020 1314 by Mitzi Henriquez RN  Outcome: Ongoing  Goal: Positive Mother-Baby interactions are observed  3/12/2020 1832 by Swapna Ovalles RN  Outcome: Met This Shift  3/12/2020 1314 by Mitzi Henriquez RN  Outcome: Ongoing     Problem: PAIN  Goal: Patient's pain/discomfort is manageable  3/12/2020 1832 by Swapna Ovalles RN  Outcome: Met This Shift  3/12/2020 1314 by Mitzi Henriquez RN  Outcome: Ongoing

## 2020-03-12 NOTE — PLAN OF CARE
Problem: Anxiety:  Goal: Level of anxiety will decrease  Description: Level of anxiety will decrease  3/12/2020 0050 by Poonam Santos RN  Outcome: Ongoing  3/11/2020 1636 by Sekou Gallo RN  Outcome: Ongoing     Problem: Breathing Pattern - Ineffective:  Goal: Able to breathe comfortably  Description: Able to breathe comfortably  3/12/2020 0050 by Poonam Santos RN  Outcome: Ongoing  3/11/2020 1636 by Sekou Gallo RN  Outcome: Ongoing     Problem: Pain - Acute:  Goal: Pain level will decrease  Description: Pain level will decrease  3/12/2020 0050 by Poonam Santos RN  Outcome: Ongoing  3/11/2020 1636 by Sekou Gallo RN  Outcome: Ongoing  Goal: Able to cope with pain  Description: Able to cope with pain  3/12/2020 0050 by Poonam Santos RN  Outcome: Ongoing  3/11/2020 1636 by Sekou Gallo RN  Outcome: Ongoing     Problem: Pain:  Goal: Pain level will decrease  Description: Pain level will decrease  3/12/2020 0050 by Poonam Santos RN  Outcome: Ongoing  3/11/2020 1636 by Sekou Gallo RN  Outcome: Ongoing  Goal: Control of acute pain  Description: Control of acute pain  Outcome: Ongoing  Goal: Control of chronic pain  Description: Control of chronic pain  Outcome: Ongoing

## 2020-03-12 NOTE — PLAN OF CARE
Problem: Anxiety:  Goal: Level of anxiety will decrease  Description: Level of anxiety will decrease  3/12/2020 1314 by Radha Thomas RN  Outcome: Ongoing  3/12/2020 0727 by Radha Thomas RN  Outcome: Ongoing  3/12/2020 0050 by Tena Fernandez RN  Outcome: Ongoing     Problem: Breathing Pattern - Ineffective:  Goal: Able to breathe comfortably  Description: Able to breathe comfortably  3/12/2020 1314 by Radha Thomas RN  Outcome: Ongoing  3/12/2020 0727 by Radha Thomas RN  Outcome: Ongoing  3/12/2020 0050 by Tena Fernandez RN  Outcome: Ongoing     Problem: Pain - Acute:  Goal: Pain level will decrease  Description: Pain level will decrease  3/12/2020 1314 by Radha Thomas RN  Outcome: Ongoing  3/12/2020 0727 by Radha Thomas RN  Outcome: Ongoing  3/12/2020 0050 by Tena Fernandez RN  Outcome: Ongoing  Goal: Able to cope with pain  Description: Able to cope with pain  3/12/2020 1314 by Radha Thomas RN  Outcome: Ongoing  3/12/2020 0727 by Radha Thomas RN  Outcome: Ongoing  3/12/2020 0050 by Tena Fernandez RN  Outcome: Ongoing     Problem: Pain:  Goal: Pain level will decrease  Description: Pain level will decrease  3/12/2020 1314 by Radha Thomas RN  Outcome: Ongoing  3/12/2020 0727 by Radha Thomas RN  Outcome: Ongoing  3/12/2020 0050 by Tena Fernandez RN  Outcome: Ongoing  Goal: Control of acute pain  Description: Control of acute pain  3/12/2020 1314 by Radha Thomas RN  Outcome: Ongoing  3/12/2020 0727 by Radha Thomas RN  Outcome: Ongoing  3/12/2020 0050 by Tena Fernandez RN  Outcome: Ongoing  Goal: Control of chronic pain  Description: Control of chronic pain  3/12/2020 1314 by Radha Thomas RN  Outcome: Ongoing  3/12/2020 0727 by Radha Thomas RN  Outcome: Ongoing  3/12/2020 0050 by Tena Fernandez RN  Outcome: Ongoing     Problem: VAGINAL DELIVERY - RECOVERY AND POST PARTUM  Goal: Vital signs are medically acceptable  Outcome: Ongoing  Goal: Empties bladder  Outcome: Ongoing  Goal: Edema will be absent or minimal  Outcome: Ongoing  Goal: Appropriate behavior observed  Outcome: Ongoing  Goal: Positive Mother-Baby interactions are observed  Outcome: Ongoing     Problem: PAIN  Goal: Patient's pain/discomfort is manageable  Outcome: Ongoing

## 2020-03-12 NOTE — PLAN OF CARE
Problem: Anxiety:  Goal: Level of anxiety will decrease  Description: Level of anxiety will decrease  3/12/2020 0727 by Lisa Stinson RN  Outcome: Ongoing  3/12/2020 0050 by Jose Young RN  Outcome: Ongoing     Problem: Breathing Pattern - Ineffective:  Goal: Able to breathe comfortably  Description: Able to breathe comfortably  3/12/2020 0727 by Lisa Stinson RN  Outcome: Ongoing  3/12/2020 0050 by Jose Young RN  Outcome: Ongoing     Problem: Pain - Acute:  Goal: Pain level will decrease  Description: Pain level will decrease  3/12/2020 0727 by Lisa Stinson RN  Outcome: Ongoing  3/12/2020 0050 by Jose Young RN  Outcome: Ongoing  Goal: Able to cope with pain  Description: Able to cope with pain  3/12/2020 0727 by Lisa Stinson RN  Outcome: Ongoing  3/12/2020 0050 by Jose Young RN  Outcome: Ongoing     Problem: Pain:  Goal: Pain level will decrease  Description: Pain level will decrease  3/12/2020 0727 by Lisa Stinson RN  Outcome: Ongoing  3/12/2020 0050 by Jose Young RN  Outcome: Ongoing  Goal: Control of acute pain  Description: Control of acute pain  3/12/2020 0727 by Lisa Stinson RN  Outcome: Ongoing  3/12/2020 0050 by Jose Young RN  Outcome: Ongoing  Goal: Control of chronic pain  Description: Control of chronic pain  3/12/2020 0727 by Lisa Stinson RN  Outcome: Ongoing  3/12/2020 0050 by Jose Young RN  Outcome: Ongoing

## 2020-03-12 NOTE — L&D DELIVERY SUMMARY NOTE
[address]                      [address], [address] [facility]                            LABOR AND DELIVERY NOTE    PATIENT NAME: Allyson Delvalle                :        1995  MED REC NO:   [id number]                            ROOM:       7130  ACCOUNT NO:   [de-identified]                           ADMIT DATE: 2020  PROVIDER:     Brit Duncan DO    DATE OF PROCEDURE:  2020    SURGEON:  Zaid Olson. DO Renata    This is a 25-year-old female  3, para 1-0-1-1 at 39 weeks and a  day. She came in yesterday for induction of labor. She was initially  3-cm dilated. Her pregnancy was uncomplicated. Her screens were all  negative. She was started on Pitocin. She eventually got an epidural.   This morning when I examined her, she was completely dilated. I  ruptured membranes for clear fluid. She then pushed effectively and at  8:10, she delivered a viable female over an intact perineum. Following  the controlled delivery of the head, the oronasopharynx suctioned free  of clear secretions. There was no nuchal cord. She pushed the  shoulders out spontaneously. The remainder of the baby was delivered,  was placed on the maternal abdomen. The infant was vigorous and  spontaneous respirations occurred. It was a grossly normal female. After 1 minute, the cord was clamped and cut and the baby was placed on  the maternal skin for bonding. Apgars 8 and 9, birth weight pending. Cord bloods obtained. Placenta spontaneously delivered. Membranes and  cotyledons were intact. She received Pitocin and the uterus contracted  nicely and the bleeding stopped. There was no episiotomy or  lacerations. Sponge, needle, instrument counts were correct. Blood  loss was less than 300 mL and the fetal heart rate tracing was reactive  and reassuring throughout. Both baby and mom are doing well at this  time.         Douglas Palumbo DO    D: 2020 8:28:17       T: 03/12/2020 8:36:22     [email or web address]  Job#: [id number]     Doc#: [id number]    CC:

## 2020-03-12 NOTE — H&P
Department of Obstetrics and Gynecology   History & Physical    Pt Name: Maverick Oneil  MRN: [id number] Acct #: [de-identified]  YOB: 1995  Estimated Date of Delivery: 3/18/20      HPI: The patient is a 22 y.o.  female  Who presents at 43 weeks for induction    Allergies:     Allergies as of 2020   (No Known Allergies)      Medications:    Current Facility-Administered Medications:     naloxone (NARCAN) injection 0.4 mg, 0.4 mg, Intravenous, PRN, Kathie Yanez, DO    fentaNYL 125 mcg, ropivacaine 0.2% in sodium chloride 0.9% 127.5ml (OB) epidural, 12 mL/hr, Epidural, Continuous, Kathie Yanez, DO    lactated ringers infusion, , Intravenous, Continuous, Dewain Nordmann, DO, Last Rate: 125 mL/hr at 20 0556    sodium chloride flush 0.9 % injection 10 mL, 10 mL, Intravenous, 2 times per day, Dewain Nordmann, DO    sodium chloride flush 0.9 % injection 10 mL, 10 mL, Intravenous, PRN, Dewain Nordmann, DO    acetaminophen (TYLENOL) tablet 650 mg, 650 mg, Oral, Q4H PRN, Dewain Nordmann, DO    oxytocin (PITOCIN) 30 units in 500 mL infusion, 1 gay-units/min, Intravenous, Continuous, Dewain Nordmann, DO, Last Rate: 18 mL/hr at 20 0210, 18 gay-units/min at 20 0210    diphenhydrAMINE (BENADRYL) tablet 25 mg, 25 mg, Oral, Q4H PRN, Dewain Nordmann, DO    docusate sodium (COLACE) capsule 100 mg, 100 mg, Oral, BID PRN, Kathie Yanez, DO    ondansetron [facility]) injection 4 mg, 4 mg, Intravenous, Q6H PRN, Dewain Nordmann, DO    oxytocin (PITOCIN) 30 units in 500 mL infusion, 1 gay-units/min, Intravenous, Continuous PRN, Kathie Dhaliwalon, DO    benzocaine-menthol (DERMOPLAST) 20-0.5 % spray, , Topical, PRN, Dewain Nordmann, DO    butorphanol (STADOL) injection 1 mg, 1 mg, Intravenous, Q3H PRN, Kathie Yanez DO, 1 mg at 20 0003    Facility-Administered Medications Ordered in Other Encounters:     Lidocaine-EPINEPHrine 1.5 %-1:[id number], , , PRN, Rebeca Moses, APRN - CRNA, 3 mL at 20 0531    OB History:     Gyn History: Denies h/o abnormal pap smear, h/o STDs. Past Medical History:   Past Medical History:  Diagnosis Date   Postpartum depression    Spontaneous        Past Surgical History: No past surgical history on file. Social History:   Social History    Tobacco Use  Smoking Status Never Smoker  Smokeless Tobacco Never Used       Family History: Noncontributory; Denies h/o cancer. ROS:  Negative except as stated in HPI, denies nausea, vomiting, fever, chills, headache or dysuria.      PE:  Vitals:   20 0730  BP: (!) 100/54  Pulse: 63  Resp:   Temp:       General: well nourished, well developed, in no acute distress  CV: Normal heart sounds  Resp: breathing unlabored  Abdomen: Nontender, no rebound, no guarding  FH:  Cx:3cm    NST - Cat 1: FHR 140s, moderate variability, +accels, -decels    Labs:       Assessment:   22 y.o.  female at 44 weeks for induction    Plan:       Mc Gonzalez DO

## 2020-03-12 NOTE — L&D DELIVERY SUMMARY NOTE
Department of Obstetrics and Gynecology  Spontaneous Vaginal Delivery Note      Pt Name: Maverick Oneil  MRN: [id number] Acct #: [de-identified]  YOB: 1995  Procedure Performed By: Lana Rodriguez MD      Pre-operative Diagnosis:  Term pregnancy  Post-operative Diagnosis: Same, delivered. Procedure:  Spontaneous vaginal delivery  Surgeon:  Lana Rodriguez DO    Information for the patient's :  Loly Bruce [[id number]]          Anesthesia:  epidural anesthesia  Estimated blood loss:  300cc  Specimen:  Placenta not sent to pathology   Complications:  none  Condition:  infant stable to general nursery    Details of Procedure: The patient is a 22 y.o. female at 36w3d   OB History        3    Para   1    Term   1            AB   1    Living   1       SAB   1    TAB        Ectopic        Molar        Multiple        Live Births   1             who was admitted for induction. She received the following interventions: IV Pitocin induction. The patient progressed well,did receive an epidural, became complete and started to push. She was placed in the dorsal lithotomy position and prepped. She delivered the vertex over an intact perineum . The nose and mouth were suctioned with bulb suction and the rest of the infant delivered atraumatically, placed on mother abdomen. Cord was clamped and cut and infant handed off to the waiting nurse for evaluation after mom had adequate time for bonding unless needed to be evaluated sooner. The placenta delivered intact, whole and that the umbilical cord had three vessels noted. The perineum and vagina were explored and a no lacerations were found degree laceration was repaired in standard fashion with 3-0 Vicryl. A vaginal sweep was performed and there were no retained products. Sponge, needle and instrument counts were correct. Delivery Summary: viable female.  Apgar 8/9  Information for the patient's :

## 2020-03-12 NOTE — FLOWSHEET NOTE
Assisted patient up to the bathroom with farhat ceron. Unmeasured void. Cherelle care done. No sitting in chair.

## 2020-03-12 NOTE — ANESTHESIA PROCEDURE NOTES
Epidural Block    Patient location during procedure: OB  Reason for block: labor epidural  Staffing  Resident/CRNA: GENO Ceballos - CRNA  Preanesthetic Checklist  Completed: patient identified, site marked, surgical consent, pre-op evaluation, timeout performed, IV checked, risks and benefits discussed, monitors and equipment checked, anesthesia consent given, oxygen available and patient being monitored  Epidural  Patient position: sitting  Prep: ChloraPrep  Patient monitoring: continuous pulse ox and frequent blood pressure checks  Approach: midline  Location: lumbar (1-5)  Injection technique: REECE saline  Provider prep: mask and sterile gloves  Needle  Needle type: Tuohy   Needle gauge: 17 G  Needle length: 3.5 in  Needle insertion depth: 5 cm  Catheter type: side hole  Catheter size: 20G.   Catheter at skin depth: 10 cm  Test dose: negative  Kit: [id number]  Lot number: [id number]  Expiration date: 3/31/2021  Assessment  Sensory level: T8  Hemodynamics: stable  Attempts: 2  Additional Notes  SUSIE

## 2020-03-13 VITALS
HEART RATE: 74 BPM | OXYGEN SATURATION: 98 % | TEMPERATURE: 98.1 F | WEIGHT: 187 LBS | HEIGHT: 63 IN | DIASTOLIC BLOOD PRESSURE: 56 MMHG | RESPIRATION RATE: 20 BRPM | SYSTOLIC BLOOD PRESSURE: 115 MMHG | BODY MASS INDEX: 33.13 KG/M2

## 2020-03-13 LAB — HEMOGLOBIN: 10 G/DL (ref 12–16)

## 2020-03-13 PROCEDURE — 36415 COLL VENOUS BLD VENIPUNCTURE: CPT

## 2020-03-13 PROCEDURE — 6370000000 HC RX 637 (ALT 250 FOR IP): Performed by: OBSTETRICS & GYNECOLOGY

## 2020-03-13 PROCEDURE — 85018 HEMOGLOBIN: CPT

## 2020-03-13 PROCEDURE — 7200000001 HC VAGINAL DELIVERY

## 2020-03-13 RX ORDER — LANOLIN ALCOHOL/MO/W.PET/CERES
325 CREAM (GRAM) TOPICAL 2 TIMES DAILY
Qty: 90 TABLET | Refills: 3 | Status: SHIPPED | OUTPATIENT
Start: 2020-03-13

## 2020-03-13 RX ORDER — IBUPROFEN 600 MG/1
600 TABLET ORAL 4 TIMES DAILY PRN
Qty: 40 TABLET | Refills: 0 | Status: SHIPPED | OUTPATIENT
Start: 2020-03-13 | End: 2020-11-26

## 2020-03-13 RX ADMIN — IBUPROFEN 800 MG: 800 TABLET, FILM COATED ORAL at 09:23

## 2020-03-13 RX ADMIN — IBUPROFEN 800 MG: 800 TABLET, FILM COATED ORAL at 00:58

## 2020-03-13 RX ADMIN — DOCUSATE SODIUM 100 MG: 100 CAPSULE, LIQUID FILLED ORAL at 09:23

## 2020-03-13 ASSESSMENT — PAIN DESCRIPTION - DESCRIPTORS: DESCRIPTORS: CRAMPING

## 2020-03-13 ASSESSMENT — PAIN SCALES - GENERAL
PAINLEVEL_OUTOF10: 4
PAINLEVEL_OUTOF10: 0
PAINLEVEL_OUTOF10: 4
PAINLEVEL_OUTOF10: 5

## 2020-03-13 ASSESSMENT — PAIN DESCRIPTION - LOCATION: LOCATION: ABDOMEN

## 2020-03-13 ASSESSMENT — PAIN DESCRIPTION - FREQUENCY: FREQUENCY: INTERMITTENT

## 2020-03-13 ASSESSMENT — PAIN DESCRIPTION - ORIENTATION: ORIENTATION: LOWER

## 2020-03-13 ASSESSMENT — PAIN DESCRIPTION - PAIN TYPE: TYPE: ACUTE PAIN

## 2020-03-13 NOTE — PLAN OF CARE
pain  3/12/2020 1834 by Dima West RN  Outcome: Completed  3/12/2020 1833 by Dima West RN  Outcome: Completed  3/12/2020 1314 by Devendra Brice RN  Outcome: Ongoing  3/12/2020 0727 by Devendra Brice RN  Outcome: Ongoing  Goal: Control of chronic pain  3/12/2020 1834 by Dima West RN  Outcome: Completed  3/12/2020 1833 by Dima West RN  Outcome: Completed  3/12/2020 1314 by Devendra Brice RN  Outcome: Ongoing  3/12/2020 0727 by Devendra Brice RN  Outcome: Ongoing     Problem: VAGINAL DELIVERY - RECOVERY AND POST PARTUM  Goal: Vital signs are medically acceptable  3/12/2020 2023 by Rick Graham RN  Outcome: Met This Shift  3/12/2020 1832 by Dima West RN  Outcome: Met This Shift  3/12/2020 1314 by Devendra Brice RN  Outcome: Ongoing  Goal: Empties bladder  3/12/2020 2023 by Rick Graham RN  Outcome: Met This Shift  3/12/2020 1832 by Dima West RN  Outcome: Met This Shift  3/12/2020 1314 by Devendra Brice RN  Outcome: Ongoing  Goal: Edema will be absent or minimal  3/12/2020 2023 by Rick Graham RN  Outcome: Met This Shift  3/12/2020 1832 by Dima West RN  Outcome: Met This Shift  3/12/2020 1314 by Devendra Brice RN  Outcome: Ongoing  Goal: Appropriate behavior observed  3/12/2020 2023 by Rick Graham RN  Outcome: Met This Shift  3/12/2020 1832 by Dima West RN  Outcome: Met This Shift  3/12/2020 1314 by Devendra Brice RN  Outcome: Ongoing  Goal: Positive Mother-Baby interactions are observed  3/12/2020 2023 by Rick Graham RN  Outcome: Met This Shift  3/12/2020 1832 by Dima West RN  Outcome: Met This Shift  3/12/2020 1314 by Devendra Brice RN  Outcome: Ongoing     Problem: PAIN  Goal: Patient's pain/discomfort is manageable  3/12/2020 2023 by Rick Graham RN  Outcome: Met This Shift  3/12/2020 1832 by Dima West RN  Outcome: Met This Shift  3/12/2020 1314 by Leah Wrigth

## 2020-03-13 NOTE — DISCHARGE SUMMARY
Vaginal Delivery PP Note/Obstetric Discharge Summary      Subjective:      22 y.o.  [id number] @ 39w1d    Postpartum Day 1: Vaginal Delivery on 3/12 @  for baby girl    The patient feels well. The patient denies emotional concerns. Pain is well controlled with current medications. The baby iswell. Baby is feeding via breast. The patient is ambulating well. The patient is tolerating a normal diet. Objective:     No data found. General:    alert, appears stated age and cooperative  Bowel Sounds:  active  Lochia:  appropriate  Uterine Fundus:   firm  Perineum: Episiotomy yes -   Lacerationyes -     DVT Evaluation:  No evidence of DVT seen on physical exam.    No results found for: CBC      Assessment:    Status post vaginal delivery . Doing well postoperatively. Plan:    Discharge home with standard precautions and return to clinic in 4-6 weeks. Routine postpartum instructions reviewed with patient. Family planning concern discussed with patient.      Reasons for Admission on 3/11/2020  3:12 PM    Induction of Labor    Prenatal Procedures  None    Intrapartum Procedures  Delivery Method: N/A    Postpartum Procedures  None    [place] Data  Information for the patient's :  Nadgigi Giraldo Girl Tenisha Mueller [[id number]]  female  Birth Weight: 6 lb 7 oz (2.92 kg)    Discharge With Mother  Complications: No    Discharge Diagnosis  Patient Active Problem List   Diagnosis Date Noted   Normal labor 2020   False labor after 37 completed weeks of gestation 2020   Premature uterine contractions    36 weeks gestation of pregnancy     uterine contractions, antepartum, third trimester    Positive fetal fibronectin at 22 weeks to 34 weeks gestation    32 weeks gestation of pregnancy       Discharge Information  Current Discharge Medication List    CONTINUE these medications which have NOT CHANGED   Details  Gejcyg-LaRjh-GkLaj-Meth-FA-DHA (PRENATE MINI) 18-0.6-0.4-350 MG CAPS Take 1 tablet by mouth daily  Qty: 30 capsule, Refills: 9            Discharge to: Home        Discharge Date: 3/13

## 2020-08-25 ENCOUNTER — HOSPITAL ENCOUNTER (EMERGENCY)
Age: 25
Discharge: HOME | End: 2020-08-25
Attending: EMERGENCY MEDICINE
Payer: COMMERCIAL

## 2020-08-25 VITALS
HEIGHT: 63 IN | SYSTOLIC BLOOD PRESSURE: 105 MMHG | HEART RATE: 85 BPM | OXYGEN SATURATION: 100 % | BODY MASS INDEX: 28.7 KG/M2 | RESPIRATION RATE: 16 BRPM | DIASTOLIC BLOOD PRESSURE: 65 MMHG | TEMPERATURE: 99.7 F | WEIGHT: 162 LBS

## 2020-08-25 PROCEDURE — 99284 EMERGENCY DEPT VISIT MOD MDM: CPT

## 2020-08-25 PROCEDURE — 6370000000 HC RX 637 (ALT 250 FOR IP): Performed by: EMERGENCY MEDICINE

## 2020-08-25 RX ORDER — AZITHROMYCIN 250 MG/1
TABLET, FILM COATED ORAL
Qty: 1 PACKET | Refills: 0 | Status: SHIPPED | OUTPATIENT
Start: 2020-08-25 | End: 2020-09-04

## 2020-08-25 RX ORDER — METHYLPREDNISOLONE 4 MG/1
TABLET ORAL
Qty: 1 KIT | Refills: 0 | Status: SHIPPED | OUTPATIENT
Start: 2020-08-25 | End: 2020-11-26

## 2020-08-25 RX ORDER — AZITHROMYCIN 500 MG/1
500 TABLET, FILM COATED ORAL ONCE
Status: COMPLETED | OUTPATIENT
Start: 2020-08-25 | End: 2020-08-25

## 2020-08-25 RX ORDER — PREDNISONE 20 MG/1
40 TABLET ORAL ONCE
Status: COMPLETED | OUTPATIENT
Start: 2020-08-25 | End: 2020-08-25

## 2020-08-25 RX ADMIN — PREDNISONE 40 MG: 20 TABLET ORAL at 11:40

## 2020-08-25 RX ADMIN — AZITHROMYCIN MONOHYDRATE 500 MG: 500 TABLET ORAL at 11:40

## 2020-08-25 ASSESSMENT — PAIN DESCRIPTION - PAIN TYPE: TYPE: ACUTE PAIN

## 2020-08-25 ASSESSMENT — PAIN SCALES - GENERAL: PAINLEVEL_OUTOF10: 7

## 2020-08-25 ASSESSMENT — PAIN DESCRIPTION - DESCRIPTORS: DESCRIPTORS: ACHING

## 2020-08-25 ASSESSMENT — PAIN DESCRIPTION - LOCATION: LOCATION: GENERALIZED

## 2020-08-25 NOTE — ED PROVIDER NOTES
FERROUS SULFATE (FE TABS) 325 (65 FE) MG EC TABLET    Take 1 tablet by mouth 2 times daily    IBUPROFEN (ADVIL;MOTRIN) 600 MG TABLET    Take 1 tablet by mouth 4 times daily as needed for Pain    ZZCOPA-JRHNZ-WLXTV-METH-FA-DHA (PRENATE MINI) 18-0.6-0.4-350 MG CAPS    Take 1 tablet by mouth daily       ALLERGIES     Patient has no known allergies.     FAMILY HISTORY       Family History   Problem Relation Age of Onset    Breast Cancer Neg Hx     Ovarian Cancer Neg Hx     Cervical Cancer Neg Hx     Uterine Cancer Neg Hx           SOCIAL HISTORY       Social History     Socioeconomic History    Marital status: Single     Spouse name: None    Number of children: None    Years of education: None    Highest education level: None   Occupational History    None   Social Needs    Financial resource strain: None    Food insecurity     Worry: None     Inability: None    Transportation needs     Medical: None     Non-medical: None   Tobacco Use    Smoking status: Never Smoker    Smokeless tobacco: Never Used   Substance and Sexual Activity    Alcohol use: No     Alcohol/week: 0.0 standard drinks    Drug use: Never    Sexual activity: Yes     Partners: Male   Lifestyle    Physical activity     Days per week: None     Minutes per session: None    Stress: None   Relationships    Social connections     Talks on phone: None     Gets together: None     Attends Jewish service: None     Active member of club or organization: None     Attends meetings of clubs or organizations: None     Relationship status: None    Intimate partner violence     Fear of current or ex partner: None     Emotionally abused: None     Physically abused: None     Forced sexual activity: None   Other Topics Concern    None   Social History Narrative    None       SCREENINGS      @FLOW([id number])@      PHYSICAL EXAM    (up to 7 for level 4, 8 or more for level 5)     ED Triage Vitals [08/25/20 1110]   BP Temp Temp Source Pulse Resp SpO2 Height Weight   105/65 99.7 °F (37.6 °C) Oral 85 16 100 % 5' 3\" (1.6 m) 162 lb (73.5 kg)       Physical Exam     CONST: -Well-developed well-nourished ;                -In no acute distress. -Vitals reviewed. EYES: -EOM intact, RUSLAN:              -Sclera normal and conjunctiva: clear bilaterally. ENT: -There is bilateral tonsillar hyperemia of the left tympanic membrane is injected    NECK: -Supple (chin-to-chest). CARD: -Rate and rhythm: Regular              -Murmurs: No  RESP: -Respiratory effort and chest excursion with respirations: Normal             -Breath sounds equal bilaterally: Clear             -Wheezes: No             -Rales: No    BACK: -Flank pain: No              -Pain on palpation: No    ABD: -Distended: No           -Bruits: No           -Bowel sounds: Normal.           -Deep palpation: Non-tender           -Organomegaly palpable: No           -Abnormal masses: No    EXT: Gross appearance and use of all four extremities: Normal     SKIN: -Good turgor warm and dry. -Apparent lesions or rashes: No    NEURO: -Patient: alert                -Oriented to: person, place and time.                 -Appearance and judgment: appropriate.                -Cranial Nerves: Normal.                -Speech: Normal          DIAGNOSTIC RESULTS     EKG: All EKG's are interpreted by the Emergency Department Physician who either signs or Co-signsthis chart in the absence of a cardiologist.        RADIOLOGY:   Ellinger Perone such as CT, Ultrasound and MRI are read by the radiologist. Plain radiographic images are visualized and preliminarily interpreted by the emergency physician with the below findings:        Interpretation per the Radiologist below, if available at the time ofthis note:    No orders to display         ED BEDSIDE ULTRASOUND:   Performed by ED Physician - none    LABS:  Labs Reviewed - No data to display    All other labs were within normal range or not returned as of this dictation. EMERGENCY DEPARTMENT COURSE and DIFFERENTIAL DIAGNOSIS/MDM:   Vitals:    Vitals:    08/25/20 1110   BP: 105/65   Pulse: 85   Resp: 16   Temp: 99.7 °F (37.6 °C)   TempSrc: Oral   SpO2: 100%   Weight: 162 lb (73.5 kg)   Height: 5' 3\" (1.6 m)           MDM       Patient will return for worsening weakening especially associated persistent fevers. No COVID symptoms but she is to see her doctor unless she is much improved in 4 days forgo back to work clearance    CRITICAL CARE TIME   Total Critical Care time was  minutes, excluding separately reportableprocedures. There was a high probability of clinicallysignificant/life threatening deterioration in the patient's condition which required my urgent intervention. CONSULTS:  None    PROCEDURES:  Unless otherwise noted below, none     Procedures    FINAL IMPRESSION      1. Suppurative otitis media of left ear, unspecified chronicity    2. Acute pharyngitis, unspecified etiology    3. Acute bronchitis, unspecified organism          DISPOSITION/PLAN   DISPOSITION Decision To Discharge 08/25/2020 11:39:06 AM      PATIENT REFERRED TO:  Clayborn Schilder, MD  [id number] [place] [address]  [phone]    In 3 days  As needed      DISCHARGE MEDICATIONS:  New Prescriptions    AZITHROMYCIN (ZITHROMAX) 250 MG TABLET    Take 2 tablets (500 mg) on Day 1, followed by 1 tablet (250 mg) once daily on Days 2 through 5. METHYLPREDNISOLONE (MEDROL, CLEMENTE,) 4 MG TABLET    Take by mouth.           (Please note that portions of this note were completed with a voice recognition program.  Efforts were made to edit the dictations but occasionally words are mis-transcribed.)    Orland Sicard, MD (electronically signed)  Attending Emergency Physician          Orland Sicard, MD  08/25/20 9556

## 2020-08-25 NOTE — ED TRIAGE NOTES
A & Ox4. Skin pink warm and dry. Pt states she is coughing up yellowish phlegm. Denies N/V/D. States last took Tylenol at 0730 today.

## 2020-11-26 ENCOUNTER — APPOINTMENT (OUTPATIENT)
Dept: CT IMAGING | Age: 25
End: 2020-11-26
Payer: COMMERCIAL

## 2020-11-26 ENCOUNTER — HOSPITAL ENCOUNTER (EMERGENCY)
Age: 25
Discharge: HOME | End: 2020-11-26
Attending: EMERGENCY MEDICINE
Payer: COMMERCIAL

## 2020-11-26 ENCOUNTER — APPOINTMENT (OUTPATIENT)
Dept: GENERAL RADIOLOGY | Age: 25
End: 2020-11-26
Payer: COMMERCIAL

## 2020-11-26 VITALS
WEIGHT: 161 LBS | RESPIRATION RATE: 16 BRPM | OXYGEN SATURATION: 99 % | BODY MASS INDEX: 28.53 KG/M2 | HEART RATE: 69 BPM | HEIGHT: 63 IN | SYSTOLIC BLOOD PRESSURE: 123 MMHG | DIASTOLIC BLOOD PRESSURE: 59 MMHG | TEMPERATURE: 98.4 F

## 2020-11-26 LAB
ANION GAP SERPL CALCULATED.3IONS-SCNC: 8 MEQ/L (ref 9–15)
BASOPHILS ABSOLUTE: 0.1 K/UL (ref 0–0.2)
BASOPHILS RELATIVE PERCENT: 0.9 %
BILIRUBIN URINE: NEGATIVE
BLOOD, URINE: NEGATIVE
BUN BLDV-MCNC: 9 MG/DL (ref 6–20)
CALCIUM SERPL-MCNC: 8.8 MG/DL (ref 8.5–9.9)
CHLORIDE BLD-SCNC: 109 MEQ/L (ref 95–107)
CHP ED QC CHECK: NORMAL
CLARITY: CLEAR
CO2: 25 MEQ/L (ref 20–31)
COLOR: YELLOW
CREAT SERPL-MCNC: 0.68 MG/DL (ref 0.5–0.9)
EOSINOPHILS ABSOLUTE: 0 K/UL (ref 0–0.7)
EOSINOPHILS RELATIVE PERCENT: 0.5 %
GFR AFRICAN AMERICAN: >60
GFR NON-AFRICAN AMERICAN: >60
GLUCOSE BLD-MCNC: 76 MG/DL (ref 70–99)
GLUCOSE URINE: NEGATIVE MG/DL
HCT VFR BLD CALC: 38.2 % (ref 37–47)
HEMOGLOBIN: 12.7 G/DL (ref 12–16)
KETONES, URINE: NEGATIVE MG/DL
LEUKOCYTE ESTERASE, URINE: NEGATIVE
LYMPHOCYTES ABSOLUTE: 1.6 K/UL (ref 1–4.8)
LYMPHOCYTES RELATIVE PERCENT: 27.5 %
MCH RBC QN AUTO: 28.1 PG (ref 27–31.3)
MCHC RBC AUTO-ENTMCNC: 33.1 % (ref 33–37)
MCV RBC AUTO: 84.8 FL (ref 82–100)
MONOCYTES ABSOLUTE: 0.4 K/UL (ref 0.2–0.8)
MONOCYTES RELATIVE PERCENT: 6.3 %
NEUTROPHILS ABSOLUTE: 3.7 K/UL (ref 1.4–6.5)
NEUTROPHILS RELATIVE PERCENT: 64.8 %
NITRITE, URINE: NEGATIVE
PDW BLD-RTO: 14.4 % (ref 11.5–14.5)
PH UA: 7 (ref 5–9)
PLATELET # BLD: 170 K/UL (ref 130–400)
POTASSIUM SERPL-SCNC: 3.8 MEQ/L (ref 3.4–4.9)
PREGNANCY TEST URINE, POC: NEGATIVE
PROTEIN UA: NEGATIVE MG/DL
RBC # BLD: 4.5 M/UL (ref 4.2–5.4)
SODIUM BLD-SCNC: 142 MEQ/L (ref 135–144)
SPECIFIC GRAVITY UA: 1.02 (ref 1–1.03)
UROBILINOGEN, URINE: 1 E.U./DL
WBC # BLD: 5.8 K/UL (ref 4.8–10.8)

## 2020-11-26 PROCEDURE — 70450 CT HEAD/BRAIN W/O DYE: CPT

## 2020-11-26 PROCEDURE — 71046 X-RAY EXAM CHEST 2 VIEWS: CPT

## 2020-11-26 PROCEDURE — 80048 BASIC METABOLIC PNL TOTAL CA: CPT

## 2020-11-26 PROCEDURE — 36415 COLL VENOUS BLD VENIPUNCTURE: CPT

## 2020-11-26 PROCEDURE — 81003 URINALYSIS AUTO W/O SCOPE: CPT

## 2020-11-26 PROCEDURE — 2580000003 HC RX 258: Performed by: EMERGENCY MEDICINE

## 2020-11-26 PROCEDURE — 85025 COMPLETE CBC W/AUTO DIFF WBC: CPT

## 2020-11-26 PROCEDURE — 99283 EMERGENCY DEPT VISIT LOW MDM: CPT

## 2020-11-26 RX ORDER — 0.9 % SODIUM CHLORIDE 0.9 %
1000 INTRAVENOUS SOLUTION INTRAVENOUS ONCE
Status: COMPLETED | OUTPATIENT
Start: 2020-11-26 | End: 2020-11-26

## 2020-11-26 RX ORDER — SODIUM CHLORIDE 0.9 % (FLUSH) 0.9 %
3 SYRINGE (ML) INJECTION EVERY 8 HOURS
Status: DISCONTINUED | OUTPATIENT
Start: 2020-11-26 | End: 2020-11-26 | Stop reason: HOSPADM

## 2020-11-26 RX ADMIN — SODIUM CHLORIDE 1000 ML: 9 INJECTION, SOLUTION INTRAVENOUS at 19:47

## 2020-11-26 RX ADMIN — Medication 3 ML: at 19:51

## 2020-11-26 ASSESSMENT — ENCOUNTER SYMPTOMS
SORE THROAT: 0
NAUSEA: 0
ABDOMINAL PAIN: 0
BACK PAIN: 0
EYE REDNESS: 0
COUGH: 0
SHORTNESS OF BREATH: 0
EYE DISCHARGE: 0
VOMITING: 0

## 2020-11-26 ASSESSMENT — PAIN DESCRIPTION - LOCATION: LOCATION: LEG

## 2020-11-26 ASSESSMENT — PAIN SCALES - GENERAL: PAINLEVEL_OUTOF10: 7

## 2020-11-26 ASSESSMENT — PAIN DESCRIPTION - FREQUENCY: FREQUENCY: CONTINUOUS

## 2020-11-26 ASSESSMENT — PAIN DESCRIPTION - ORIENTATION: ORIENTATION: LEFT

## 2020-11-26 ASSESSMENT — PAIN DESCRIPTION - DESCRIPTORS: DESCRIPTORS: TINGLING;OTHER (COMMENT)

## 2020-11-27 NOTE — ED NOTES
Dr. Marsha Fox at the bedside at this time. Assessment completed.       Cristobal Ruiz RN  11/26/20 6797

## 2020-11-27 NOTE — ED TRIAGE NOTES
Patient in ED through triage for bruising on left leg and right hand, also left leg numbness. Patient states \"she woke up this morning with symptoms\" patient denies injury. Patient has been on iron in the past for anemia and no longer takes her iron tablets. Patient denies any home medications at this time. Patient alert and oriented x 4.

## 2020-11-27 NOTE — ED PROVIDER NOTES
[address] [facility] ED  EMERGENCY DEPARTMENT ENCOUNTER      Pt Name: Birdia Fleischer  MRN: [id number]  [place] 1995  Date of evaluation: 11/26/2020  Provider: Bharti Moss DO        HISTORY OF PRESENT ILLNESS    Birdia Fleischer is a 26 y.o. female per chart review has ah/o her left leg feeling tingling and bruises she noticed the morning. She has a history of anemia but has not been taking her iron pills. The bruising is on her leg upper thigh. The history is provided by the patient. Fatigue   Severity:  Moderate  Onset quality:  Gradual  Duration:  12 hours  Timing:  Intermittent  Progression:  Waxing and waning  Chronicity:  New  Relieved by:  Rest  Worsened by: Activity  Ineffective treatments:  None tried  Associated symptoms: sensory-motor deficit    Associated symptoms: no abdominal pain, no chest pain, no cough, no dizziness, no dysuria, no fever, no nausea, no shortness of breath and no vomiting    Risk factors: anemia             REVIEW OF SYSTEMS       Review of Systems   Constitutional: Positive for fatigue. Negative for chills and fever. HENT: Negative for ear pain and sore throat. Eyes: Negative for discharge and redness. Respiratory: Negative for cough and shortness of breath. Cardiovascular: Negative for chest pain and palpitations. Gastrointestinal: Negative for abdominal pain, nausea and vomiting. Genitourinary: Negative for difficulty urinating and dysuria. Musculoskeletal: Negative for back pain and neck pain. Skin: Negative for rash and wound. Neurological: Positive for numbness. Negative for dizziness and syncope. Hematological: Bruises/bleeds easily. Psychiatric/Behavioral: Negative for confusion. The patient is not nervous/anxious. All other systems reviewed and are negative. Except as noted above the remainder of the review of systems was reviewed and negative.        PAST MEDICAL HISTORY     Past Medical History:   Diagnosis Date    Postpartum depression     Spontaneous           SURGICAL HISTORY     History reviewed. No pertinent surgical history. CURRENT MEDICATIONS       Previous Medications    FERROUS SULFATE (FE TABS) 325 (65 FE) MG EC TABLET    Take 1 tablet by mouth 2 times daily       ALLERGIES     Patient has no known allergies. FAMILY HISTORY       Family History   Problem Relation Age of Onset    Breast Cancer Neg Hx     Ovarian Cancer Neg Hx     Cervical Cancer Neg Hx     Uterine Cancer Neg Hx           SOCIAL HISTORY       Social History     Socioeconomic History    Marital status: Single     Spouse name: None    Number of children: None    Years of education: None    Highest education level: None   Occupational History    None   Social Needs    Financial resource strain: None    Food insecurity     Worry: None     Inability: None    Transportation needs     Medical: None     Non-medical: None   Tobacco Use    Smoking status: Never Smoker    Smokeless tobacco: Never Used   Substance and Sexual Activity    Alcohol use: No     Alcohol/week: 0.0 standard drinks    Drug use: Never    Sexual activity: Yes     Partners: Male   Lifestyle    Physical activity     Days per week: None     Minutes per session: None    Stress: None   Relationships    Social connections     Talks on phone: None     Gets together: None     Attends Mormonism service: None     Active member of club or organization: None     Attends meetings of clubs or organizations: None     Relationship status: None    Intimate partner violence     Fear of current or ex partner: None     Emotionally abused: None     Physically abused: None     Forced sexual activity: None   Other Topics Concern    None   Social History Narrative    None         PHYSICAL EXAM       ED Triage Vitals   BP Temp Temp src Pulse Resp SpO2 Height Weight   -- -- -- -- -- -- -- --       Physical Exam  Vitals signs and nursing note reviewed.    Constitutional: Appearance: Normal appearance. HENT:      Head: Normocephalic and atraumatic. Right Ear: Tympanic membrane normal.      Left Ear: Tympanic membrane normal.      Nose: Nose normal.      Mouth/Throat:      Mouth: Mucous membranes are moist.      Pharynx: Oropharynx is clear. Eyes:      General: Lids are normal.      Extraocular Movements: Extraocular movements intact. Conjunctiva/sclera: Conjunctivae normal.      Pupils: Pupils are equal, round, and reactive to light. Neck:      Musculoskeletal: Full passive range of motion without pain, normal range of motion and neck supple. Cardiovascular:      Rate and Rhythm: Normal rate and regular rhythm. Pulses: Normal pulses. Heart sounds: Normal heart sounds. Pulmonary:      Effort: Pulmonary effort is normal.      Breath sounds: Normal breath sounds. Abdominal:      General: Abdomen is flat. Bowel sounds are normal.      Palpations: Abdomen is soft. Musculoskeletal: Normal range of motion. Skin:     General: Skin is warm. Capillary Refill: Capillary refill takes less than 2 seconds. Neurological:      General: No focal deficit present. Mental Status: She is alert and oriented to person, place, and time. GCS: GCS eye subscore is 4. GCS verbal subscore is 5. GCS motor subscore is 6. Cranial Nerves: Cranial nerves are intact. Sensory: Sensation is intact. Motor: Motor function is intact. Coordination: Coordination is intact. Gait: Gait is intact. Deep Tendon Reflexes: Reflexes are normal and symmetric. Reflex Scores:       Tricep reflexes are 2+ on the right side and 2+ on the left side. Bicep reflexes are 2+ on the right side and 2+ on the left side. Brachioradialis reflexes are 2+ on the right side and 2+ on the left side. Patellar reflexes are 2+ on the right side and 2+ on the left side.        Achilles reflexes are 2+ on the right side and 2+ on the left side.  Psychiatric:         Attention and Perception: Attention and perception normal.         Mood and Affect: Mood normal.         Behavior: Behavior normal. Behavior is cooperative. LABS:  Labs Reviewed   BASIC METABOLIC PANEL - Abnormal; Notable for the following components:       Result Value    Chloride 109 (*)     Anion Gap 8 (*)     All other components within normal limits   POCT URINE PREGNANCY - Normal   CBC WITH AUTO DIFFERENTIAL   URINALYSIS         MDM:   Vitals:    Vitals:    11/26/20 1916   BP: (!) 123/59   Pulse: 69   Resp: 16   Temp: 98.4 °F (36.9 °C)   TempSrc: Oral   SpO2: 99%   Weight: 161 lb (73 kg)   Height: 5' 3\" (1.6 m)       MDM  Number of Diagnoses or Management Options  Diagnosis management comments: Patient presents with tingling on her left leg with a bruise. Labs, CXR, IV and CT brain ordered. CT brain is negative for any acute changes. Her CBC and BMP were reviewed and do not show any acute changes. CXR is negative. She will be discharged home. Emily LAZAR     The lab results, radiology and test results were reviewed with the patient and family. The patient will follow up in 2 days with their primary care doctor. If their symptoms change or get worse they will return to the ER. CRITICAL CARE TIME   Total CriticalCare time was 0 minutes, excluding separately reportable procedures. There was a high probability of clinically significant/life threatening deterioration in the patient's condition which required my urgent intervention. PROCEDURES:  Unlessotherwise noted below, none     Procedures      FINAL IMPRESSION      1.  Left leg paresthesias          DISPOSITION/PLAN   DISPOSITION Decision To Discharge 11/26/2020 09:06:44 PM          Con Gutierrez DO (electronically signed)  Attending Emergency Physician          Jake Granados DO  11/26/20 9363

## 2020-11-27 NOTE — ED NOTES
Discharge  instructions given and reviewed. Patient verbalized understanding. Patient ambulated out of ED with a steady gait to POV.      Aviva Nichols RN  11/26/20 1869

## 2021-06-30 ENCOUNTER — HOSPITAL ENCOUNTER (EMERGENCY)
Age: 26
Discharge: HOME | End: 2021-06-30
Payer: COMMERCIAL

## 2021-06-30 VITALS
OXYGEN SATURATION: 100 % | TEMPERATURE: 99.7 F | HEIGHT: 63 IN | DIASTOLIC BLOOD PRESSURE: 58 MMHG | HEART RATE: 88 BPM | RESPIRATION RATE: 18 BRPM | WEIGHT: 161 LBS | BODY MASS INDEX: 28.53 KG/M2 | SYSTOLIC BLOOD PRESSURE: 110 MMHG

## 2021-06-30 DIAGNOSIS — R51.9 NONINTRACTABLE EPISODIC HEADACHE, UNSPECIFIED HEADACHE TYPE: Primary | ICD-10-CM

## 2021-06-30 LAB
ALBUMIN SERPL-MCNC: 4.6 G/DL (ref 3.5–4.6)
ALP BLD-CCNC: 66 U/L (ref 40–130)
ALT SERPL-CCNC: 9 U/L (ref 0–33)
ANION GAP SERPL CALCULATED.3IONS-SCNC: 12 MEQ/L (ref 9–15)
AST SERPL-CCNC: 13 U/L (ref 0–35)
BASOPHILS ABSOLUTE: 0.1 K/UL (ref 0–0.2)
BASOPHILS RELATIVE PERCENT: 0.6 %
BILIRUB SERPL-MCNC: 0.6 MG/DL (ref 0.2–0.7)
BUN BLDV-MCNC: 9 MG/DL (ref 6–20)
CALCIUM SERPL-MCNC: 10.3 MG/DL (ref 8.5–9.9)
CHLORIDE BLD-SCNC: 102 MEQ/L (ref 95–107)
CO2: 26 MEQ/L (ref 20–31)
CREAT SERPL-MCNC: 0.74 MG/DL (ref 0.5–0.9)
EOSINOPHILS ABSOLUTE: 0 K/UL (ref 0–0.7)
EOSINOPHILS RELATIVE PERCENT: 0.1 %
GFR AFRICAN AMERICAN: >60
GFR NON-AFRICAN AMERICAN: >60
GLOBULIN: 3.4 G/DL (ref 2.3–3.5)
GLUCOSE BLD-MCNC: 82 MG/DL (ref 70–99)
HCT VFR BLD CALC: 43 % (ref 37–47)
HEMOGLOBIN: 14.6 G/DL (ref 12–16)
LYMPHOCYTES ABSOLUTE: 1.4 K/UL (ref 1–4.8)
LYMPHOCYTES RELATIVE PERCENT: 14.1 %
MCH RBC QN AUTO: 28.6 PG (ref 27–31.3)
MCHC RBC AUTO-ENTMCNC: 33.9 % (ref 33–37)
MCV RBC AUTO: 84.6 FL (ref 82–100)
MONOCYTES ABSOLUTE: 0.6 K/UL (ref 0.2–0.8)
MONOCYTES RELATIVE PERCENT: 6.3 %
NEUTROPHILS ABSOLUTE: 8 K/UL (ref 1.4–6.5)
NEUTROPHILS RELATIVE PERCENT: 78.9 %
PDW BLD-RTO: 13.2 % (ref 11.5–14.5)
PLATELET # BLD: 189 K/UL (ref 130–400)
POTASSIUM SERPL-SCNC: 3.6 MEQ/L (ref 3.4–4.9)
RBC # BLD: 5.09 M/UL (ref 4.2–5.4)
SODIUM BLD-SCNC: 140 MEQ/L (ref 135–144)
TOTAL PROTEIN: 8 G/DL (ref 6.3–8)
WBC # BLD: 10.1 K/UL (ref 4.8–10.8)

## 2021-06-30 PROCEDURE — 2580000003 HC RX 258: Performed by: NURSE PRACTITIONER

## 2021-06-30 PROCEDURE — 99284 EMERGENCY DEPT VISIT MOD MDM: CPT

## 2021-06-30 PROCEDURE — 6370000000 HC RX 637 (ALT 250 FOR IP): Performed by: NURSE PRACTITIONER

## 2021-06-30 PROCEDURE — 36415 COLL VENOUS BLD VENIPUNCTURE: CPT

## 2021-06-30 PROCEDURE — 80053 COMPREHEN METABOLIC PANEL: CPT

## 2021-06-30 PROCEDURE — 96375 TX/PRO/DX INJ NEW DRUG ADDON: CPT

## 2021-06-30 PROCEDURE — 85025 COMPLETE CBC W/AUTO DIFF WBC: CPT

## 2021-06-30 PROCEDURE — 96374 THER/PROPH/DIAG INJ IV PUSH: CPT

## 2021-06-30 PROCEDURE — 6360000002 HC RX W HCPCS: Performed by: NURSE PRACTITIONER

## 2021-06-30 RX ORDER — METOCLOPRAMIDE HYDROCHLORIDE 5 MG/ML
10 INJECTION INTRAMUSCULAR; INTRAVENOUS ONCE
Status: COMPLETED | OUTPATIENT
Start: 2021-06-30 | End: 2021-06-30

## 2021-06-30 RX ORDER — DIPHENHYDRAMINE HYDROCHLORIDE 50 MG/ML
50 INJECTION INTRAMUSCULAR; INTRAVENOUS ONCE
Status: DISCONTINUED | OUTPATIENT
Start: 2021-06-30 | End: 2021-07-01 | Stop reason: HOSPADM

## 2021-06-30 RX ORDER — ACETAMINOPHEN 500 MG
1000 TABLET ORAL ONCE
Status: COMPLETED | OUTPATIENT
Start: 2021-06-30 | End: 2021-06-30

## 2021-06-30 RX ORDER — KETOROLAC TROMETHAMINE 30 MG/ML
30 INJECTION, SOLUTION INTRAMUSCULAR; INTRAVENOUS ONCE
Status: COMPLETED | OUTPATIENT
Start: 2021-06-30 | End: 2021-06-30

## 2021-06-30 RX ORDER — 0.9 % SODIUM CHLORIDE 0.9 %
1000 INTRAVENOUS SOLUTION INTRAVENOUS ONCE
Status: COMPLETED | OUTPATIENT
Start: 2021-06-30 | End: 2021-06-30

## 2021-06-30 RX ORDER — METOCLOPRAMIDE 10 MG/1
10 TABLET ORAL 4 TIMES DAILY
Qty: 120 TABLET | Refills: 0 | Status: SHIPPED | OUTPATIENT
Start: 2021-06-30

## 2021-06-30 RX ADMIN — KETOROLAC TROMETHAMINE 30 MG: 30 INJECTION, SOLUTION INTRAMUSCULAR; INTRAVENOUS at 21:27

## 2021-06-30 RX ADMIN — SODIUM CHLORIDE 1000 ML: 9 INJECTION, SOLUTION INTRAVENOUS at 21:27

## 2021-06-30 RX ADMIN — ACETAMINOPHEN 1000 MG: 500 TABLET ORAL at 21:33

## 2021-06-30 RX ADMIN — METOCLOPRAMIDE HYDROCHLORIDE 10 MG: 5 INJECTION INTRAMUSCULAR; INTRAVENOUS at 21:29

## 2021-06-30 ASSESSMENT — ENCOUNTER SYMPTOMS
BLOOD IN STOOL: 0
TROUBLE SWALLOWING: 0
CONSTIPATION: 0
VOMITING: 0
PHOTOPHOBIA: 1
EYE PAIN: 0
COLOR CHANGE: 0
EYE REDNESS: 0
EYE DISCHARGE: 0
SORE THROAT: 0
WHEEZING: 0
BACK PAIN: 0
COUGH: 0
NAUSEA: 1
RHINORRHEA: 0
DIARRHEA: 0
ABDOMINAL PAIN: 0
SHORTNESS OF BREATH: 0

## 2021-06-30 ASSESSMENT — PAIN DESCRIPTION - FREQUENCY: FREQUENCY: CONTINUOUS

## 2021-06-30 ASSESSMENT — PAIN DESCRIPTION - LOCATION
LOCATION: HEAD
LOCATION: HEAD

## 2021-06-30 ASSESSMENT — PAIN SCALES - GENERAL
PAINLEVEL_OUTOF10: 9
PAINLEVEL_OUTOF10: 9
PAINLEVEL_OUTOF10: 8

## 2021-06-30 ASSESSMENT — PAIN DESCRIPTION - PAIN TYPE
TYPE: ACUTE PAIN
TYPE: ACUTE PAIN

## 2021-06-30 ASSESSMENT — PAIN DESCRIPTION - DESCRIPTORS: DESCRIPTORS: ACHING

## 2021-07-01 NOTE — ED NOTES
Pt was adminitstered medications for a headache earlier. States she did not get any relief at all from the medications. Rates her headache 8/10. [facility] notified.        Kaiden Francisco, LOLA  06/30/21 [address], RN  06/30/21 1926

## 2021-07-01 NOTE — ED TRIAGE NOTES
Pt c/o a headache and bilat ear pain for the past five days, Pt is A&OX3, calm, ambulatory, afebrile, breathes are equal and unlabored,

## 2021-07-01 NOTE — ED PROVIDER NOTES
[address] [facility] ED  EMERGENCY DEPARTMENT ENCOUNTER      Pt Name: Isi Stack  MRN: [id number]  [place] 1995  Date of evaluation: 2021  Provider: GENO Willson CNP    CHIEF COMPLAINT       Chief Complaint   Patient presents with    Headache     pt c/o a headache and bilat ear pain for the past five days         HISTORY OF PRESENT ILLNESS   (Location/Symptom, Timing/Onset,Context/Setting, Quality, Duration, Modifying Factors, Severity)  Note limiting factors. Isi Stack is a 23 y.o. female who presents to the emergency department with a chart review past medical history of spontaneous  for chief complaint of gradual onset, 8 out of 10, mild to moderate frontal sharp and throbbing headache constant for the past 4 days. Patient reports some mild photophobia with it and some mild nausea. She has generalized ear pain. She denies shortness of breath, chest pain, fevers or chills, alleviating or modifying factors. Nursing Notes were reviewed. REVIEW OF SYSTEMS    (2-9 systems for level 4, 10 or more for level 5)     Review of Systems   Constitutional: Negative for activity change, appetite change, fatigue and fever. HENT: Negative for congestion, ear pain, rhinorrhea, sore throat and trouble swallowing. Eyes: Positive for photophobia. Negative for pain, discharge and redness. Respiratory: Negative for cough, shortness of breath and wheezing. Cardiovascular: Negative for chest pain and palpitations. Gastrointestinal: Positive for nausea. Negative for abdominal pain, blood in stool, constipation, diarrhea and vomiting. Endocrine: Negative for polydipsia and polyuria. Genitourinary: Negative for decreased urine volume, dysuria, flank pain and hematuria. Musculoskeletal: Negative for arthralgias, back pain and myalgias. Skin: Negative for color change, rash and wound. Neurological: Positive for headaches.  Negative for dizziness, syncope, weakness and light-headedness. Psychiatric/Behavioral: Negative for behavioral problems. All other systems reviewed and are negative. Except as noted above the remainder of the review of systems was reviewed and negative. PAST MEDICAL HISTORY     Past Medical History:   Diagnosis Date    Postpartum depression     Spontaneous  2014     History reviewed. No pertinent surgical history. Social History     Socioeconomic History    Marital status: Single     Spouse name: None    Number of children: None    Years of education: None    Highest education level: None   Occupational History    None   Tobacco Use    Smoking status: Never Smoker    Smokeless tobacco: Never Used   Vaping Use    Vaping Use: Never used   Substance and Sexual Activity    Alcohol use: No     Alcohol/week: 0.0 standard drinks    Drug use: Never    Sexual activity: Yes     Partners: Male   Other Topics Concern    None   Social History Narrative    None     Social Determinants of Health     Financial Resource Strain:     Difficulty of Paying Living Expenses:    Food Insecurity:     Worried About Running Out of Food in the Last Year:     Ran Out of Food in the Last Year:    Transportation Needs:     Lack of Transportation (Medical):      Lack of Transportation (Non-Medical):    Physical Activity:     Days of Exercise per Week:     Minutes of Exercise per Session:    Stress:     Feeling of Stress :    Social Connections:     Frequency of Communication with Friends and Family:     Frequency of Social Gatherings with Friends and Family:     Attends Anabaptist Services:     Active Member of Clubs or Organizations:     Attends Club or Organization Meetings:     Marital Status:    Intimate Partner Violence:     Fear of Current or Ex-Partner:     Emotionally Abused:     Physically Abused:     Sexually Abused:        SCREENINGS             PHYSICAL EXAM    (up to 7 for level 4, 8 or more for level 5)     ED Triage signed)  Attending Emergency Physician          Shyann Brenner, GENO - MAYDA  06/30/21 7393

## 2021-08-01 NOTE — FLOWSHEET NOTE
Amni sure obtained
Discharge instructions explained to patient. Verbalized understanding. Discharged via ambulation, all personal belongings with patient.
Dr Jose Sánchez at bedside. H&P done.
Dr. Allyn Arndt made aware of patients contraction pattern. Will be down to see patient.
Dr. Davy Song at bedside - lab results explained. Plan of care and Discharge instructions given. Patient verbalized understanding.
Dr. Matt Coleman is currently speaking with Dr. Alba Ng via phone in regard to lab results.
Dr. Minal Gaston into triage - reviews patient strip.
Dr. Moore Rank reviews patient strip. States,\"reactive fetal heart rate and no contractions\". Order to take monitors off.
Dr. Remberto Valladares has been updated. Currently reviewing history and reviewed strip.
Patient into triage via squad stating she has had contractions for the last 30 minutes 3-4 minutes apart. States she was leaking clear fluid when she went to void but it has not been consistant. Patient had intercourse last night. Into room 313, U/A obtained, changed into hospital gown.
Patient states she did not explain well when asked about last intercourse. It was four days ago.
Boogie

## 2021-08-16 ENCOUNTER — HOSPITAL ENCOUNTER (EMERGENCY)
Age: 26
Discharge: HOME | End: 2021-08-16
Payer: COMMERCIAL

## 2021-08-16 VITALS
RESPIRATION RATE: 14 BRPM | SYSTOLIC BLOOD PRESSURE: 138 MMHG | TEMPERATURE: 96.9 F | OXYGEN SATURATION: 98 % | HEART RATE: 73 BPM | DIASTOLIC BLOOD PRESSURE: 55 MMHG

## 2021-08-16 DIAGNOSIS — A64 STI (SEXUALLY TRANSMITTED INFECTION): Primary | ICD-10-CM

## 2021-08-16 LAB
BACTERIA: ABNORMAL /HPF
BILIRUBIN URINE: NEGATIVE
BLOOD, URINE: NEGATIVE
CHP ED QC CHECK: YES
CLARITY: ABNORMAL
COLOR: YELLOW
EPITHELIAL CELLS, UA: ABNORMAL /HPF (ref 0–5)
GLUCOSE URINE: NEGATIVE MG/DL
HYALINE CASTS: ABNORMAL /HPF (ref 0–5)
KETONES, URINE: ABNORMAL MG/DL
LEUKOCYTE ESTERASE, URINE: ABNORMAL
NITRITE, URINE: NEGATIVE
PH UA: 8.5 (ref 5–9)
PREGNANCY TEST URINE, POC: NEGATIVE
PROTEIN UA: NEGATIVE MG/DL
RBC UA: ABNORMAL /HPF (ref 0–5)
SPECIFIC GRAVITY UA: 1.02 (ref 1–1.03)
URINE REFLEX TO CULTURE: YES
UROBILINOGEN, URINE: 1 E.U./DL
WBC UA: ABNORMAL /HPF (ref 0–5)

## 2021-08-16 PROCEDURE — 99283 EMERGENCY DEPT VISIT LOW MDM: CPT

## 2021-08-16 PROCEDURE — 87086 URINE CULTURE/COLONY COUNT: CPT

## 2021-08-16 PROCEDURE — 6370000000 HC RX 637 (ALT 250 FOR IP): Performed by: PHYSICIAN ASSISTANT

## 2021-08-16 PROCEDURE — 81001 URINALYSIS AUTO W/SCOPE: CPT

## 2021-08-16 PROCEDURE — 96372 THER/PROPH/DIAG INJ SC/IM: CPT

## 2021-08-16 PROCEDURE — 87491 CHLMYD TRACH DNA AMP PROBE: CPT

## 2021-08-16 PROCEDURE — 6360000002 HC RX W HCPCS: Performed by: PHYSICIAN ASSISTANT

## 2021-08-16 PROCEDURE — 87591 N.GONORRHOEAE DNA AMP PROB: CPT

## 2021-08-16 PROCEDURE — 2580000003 HC RX 258

## 2021-08-16 RX ORDER — AZITHROMYCIN 500 MG/1
1000 TABLET, FILM COATED ORAL ONCE
Status: COMPLETED | OUTPATIENT
Start: 2021-08-16 | End: 2021-08-16

## 2021-08-16 RX ORDER — CEFTRIAXONE SODIUM 250 MG/1
250 INJECTION, POWDER, FOR SOLUTION INTRAMUSCULAR; INTRAVENOUS ONCE
Status: COMPLETED | OUTPATIENT
Start: 2021-08-16 | End: 2021-08-16

## 2021-08-16 RX ADMIN — AZITHROMYCIN MONOHYDRATE 1000 MG: 500 TABLET ORAL at 12:01

## 2021-08-16 RX ADMIN — WATER 0.9 ML: 1 INJECTION INTRAMUSCULAR; INTRAVENOUS; SUBCUTANEOUS at 12:20

## 2021-08-16 RX ADMIN — CEFTRIAXONE SODIUM 250 MG: 250 INJECTION, POWDER, FOR SOLUTION INTRAMUSCULAR; INTRAVENOUS at 12:01

## 2021-08-16 ASSESSMENT — ENCOUNTER SYMPTOMS
COLOR CHANGE: 0
SORE THROAT: 0
RHINORRHEA: 0
BACK PAIN: 0
ABDOMINAL DISTENTION: 0
CONSTIPATION: 0
SHORTNESS OF BREATH: 0
EYE DISCHARGE: 0
ABDOMINAL PAIN: 0

## 2021-08-16 NOTE — ED PROVIDER NOTES
[facility] ED  eMERGENCY dEPARTMENT eNCOUnter      Pt Name: Carin Najjar  MRN: [id number]  [place] 1995  Date of evaluation: 8/16/2021  Provider: Camila Tan PA-C    CHIEF COMPLAINT       Chief Complaint   Patient presents with    Other     c/o  lower abdominal issues  states she wants to get tested for uti, stds , and preg          HISTORY OF PRESENT ILLNESS   (Location/Symptom, Timing/Onset,Context/Setting, Quality, Duration, Modifying Factors, Severity)  Note limiting factors. Carin Najjar is a 23 y.o. female who presents to the emergency department with concerns for possible pregnancy, STD evaluation, or urinary tract infection. Patient states that her boyfriend she believes string from the relationship, she is concerned that she may have contracted a sexually transmitted disease, she denies any symptoms, no urinary frequency, no vaginal discharge or bleeding. She states that her last menstrual period was on 7/25/2021. She states she just wants to be checked to make sure she has not contracted anything. She has otherwise no symptoms. She denies any pain at this time, 0 out of 10. HPI    NursingNotes were reviewed. REVIEW OF SYSTEMS    (2-9 systems for level 4, 10 or more for level 5)     Review of Systems   Constitutional: Negative for activity change and appetite change. HENT: Negative for congestion, ear discharge, ear pain, nosebleeds, rhinorrhea and sore throat. Eyes: Negative for discharge. Respiratory: Negative for shortness of breath. Cardiovascular: Negative for chest pain, palpitations and leg swelling. Gastrointestinal: Negative for abdominal distention, abdominal pain and constipation. Genitourinary: Negative for difficulty urinating, dysuria, flank pain, frequency, hematuria, pelvic pain, urgency, vaginal bleeding, vaginal discharge and vaginal pain. Musculoskeletal: Negative for arthralgias, back pain and myalgias.    Skin: Negative for color change, pallor, rash and wound. Neurological: Negative for dizziness, tremors, syncope, weakness, numbness and headaches. Psychiatric/Behavioral: Negative for agitation and confusion. Except as noted above the remainder of the review of systems was reviewed and negative. PAST MEDICAL HISTORY     Past Medical History:   Diagnosis Date    Postpartum depression     Spontaneous           SURGICALHISTORY     History reviewed. No pertinent surgical history. CURRENT MEDICATIONS       Previous Medications    FERROUS SULFATE (FE TABS) 325 (65 FE) MG EC TABLET    Take 1 tablet by mouth 2 times daily    METOCLOPRAMIDE (REGLAN) 10 MG TABLET    Take 1 tablet by mouth 4 times daily       ALLERGIES     Patient has no known allergies. FAMILY HISTORY       Family History   Problem Relation Age of Onset    Breast Cancer Neg Hx     Ovarian Cancer Neg Hx     Cervical Cancer Neg Hx     Uterine Cancer Neg Hx           SOCIAL HISTORY       Social History     Socioeconomic History    Marital status: Single     Spouse name: None    Number of children: None    Years of education: None    Highest education level: None   Occupational History    None   Tobacco Use    Smoking status: Never Smoker    Smokeless tobacco: Never Used   Vaping Use    Vaping Use: Never used   Substance and Sexual Activity    Alcohol use: No     Alcohol/week: 0.0 standard drinks    Drug use: Never    Sexual activity: Yes     Partners: Male   Other Topics Concern    None   Social History Narrative    None     Social Determinants of Health     Financial Resource Strain:     Difficulty of Paying Living Expenses:    Food Insecurity:     Worried About Running Out of Food in the Last Year:     Ran Out of Food in the Last Year:    Transportation Needs:     Lack of Transportation (Medical):      Lack of Transportation (Non-Medical):    Physical Activity:     Days of Exercise per Week:     Minutes of Exercise per Session:    Stress:     Feeling of Stress :    Social Connections:     Frequency of Communication with Friends and Family:     Frequency of Social Gatherings with Friends and Family:     Attends Mosque Services:     Active Member of Clubs or Organizations:     Attends Club or Organization Meetings:     Marital Status:    Intimate Partner Violence:     Fear of Current or Ex-Partner:     Emotionally Abused:     Physically Abused:     Sexually Abused:        SCREENINGS      @FLOW([id number])@      PHYSICAL EXAM    (up to 7 for level 4, 8 or more for level 5)     ED Triage Vitals [08/16/21 1133]   BP Temp Temp src Pulse Resp SpO2 Height Weight   (!) 138/55 96.9 °F (36.1 °C) -- 73 14 98 % -- --       Physical Exam  Vitals and nursing note reviewed. Constitutional:       General: She is not in acute distress. Appearance: Normal appearance. She is well-developed. She is not ill-appearing, toxic-appearing or diaphoretic. Comments: Patient appears in no acute distress, nontoxic appearance   HENT:      Head: Normocephalic. Nose: No congestion. Mouth/Throat:      Mouth: Mucous membranes are moist.      Pharynx: No oropharyngeal exudate or posterior oropharyngeal erythema. Eyes:      Extraocular Movements: Extraocular movements intact. Conjunctiva/sclera: Conjunctivae normal.      Pupils: Pupils are equal, round, and reactive to light. Neck:      Vascular: No JVD. Trachea: No tracheal deviation. Cardiovascular:      Rate and Rhythm: Normal rate. Pulses: Normal pulses. Heart sounds: Normal heart sounds. No murmur heard. No friction rub. No gallop. Pulmonary:      Effort: Pulmonary effort is normal. No tachypnea, accessory muscle usage, respiratory distress or retractions. Breath sounds: Normal breath sounds. No stridor. No wheezing, rhonchi or rales. Chest:      Chest wall: No tenderness. Abdominal:      General: Abdomen is flat.  Bowel sounds are normal. There is no distension or abdominal bruit. Palpations: Abdomen is soft. There is no shifting dullness, fluid wave, hepatomegaly, splenomegaly, mass or pulsatile mass. Tenderness: There is no abdominal tenderness. There is no right CVA tenderness, left CVA tenderness, guarding or rebound. Negative signs include Murray's sign, Rovsing's sign and McBurney's sign. Comments: Soft nondistended nontender no guarding mass rebound. Musculoskeletal:         General: No deformity. Normal range of motion. Cervical back: Normal range of motion and neck supple. No rigidity. Skin:     General: Skin is warm and dry. Capillary Refill: Capillary refill takes less than 2 seconds. Coloration: Skin is not jaundiced. Neurological:      General: No focal deficit present. Mental Status: She is alert and oriented to person, place, and time. Mental status is at baseline. Cranial Nerves: No cranial nerve deficit. Sensory: No sensory deficit. Motor: No weakness.       Coordination: Coordination normal.   Psychiatric:         Mood and Affect: Mood normal.         DIAGNOSTIC RESULTS     EKG: All EKG's are interpreted by the Emergency Department Physician who either signs or Co-signsthis chart in the absence of a cardiologist.        RADIOLOGY:   Alexander Shoulder such as CT, Ultrasound and MRI are read by the radiologist. Plain radiographic images are visualized and preliminarily interpreted by the emergency physician with the below findings:        Interpretation per the Radiologist below, if available at the time ofthis note:    No orders to display         ED BEDSIDE ULTRASOUND:   Performed by ED Physician - none    LABS:  Labs Reviewed   URINE RT REFLEX TO CULTURE - Abnormal; Notable for the following components:       Result Value    Clarity, UA CLOUDY (*)     Ketones, Urine TRACE (*)     Leukocyte Esterase, Urine TRACE (*)     All other components within normal limits   POCT Prescriptions    No medications on file          (Please note that portions of this note were completed with a voice recognition program.  Efforts were made to edit the dictations but occasionally words are mis-transcribed.)    Lucinda Alcantar PA-C (electronically signed)  Attending Emergency Physician         Lucinda Alcantar PA-C  08/16/21 4615

## 2021-08-18 LAB — URINE CULTURE, ROUTINE: NORMAL

## 2021-08-23 LAB
C. TRACHOMATIS DNA ,URINE: NEGATIVE
N. GONORRHOEAE DNA, URINE: NEGATIVE

## 2022-08-19 ENCOUNTER — HOSPITAL ENCOUNTER (OUTPATIENT)
Dept: ULTRASOUND IMAGING | Age: 27
Discharge: HOME | End: 2022-08-21
Payer: COMMERCIAL

## 2022-08-19 DIAGNOSIS — Z34.82 PRENATAL CARE, SUBSEQUENT PREGNANCY, SECOND TRIMESTER: ICD-10-CM

## 2022-08-19 PROCEDURE — 76805 OB US >/= 14 WKS SNGL FETUS: CPT

## 2022-12-31 ENCOUNTER — HOSPITAL ENCOUNTER (OUTPATIENT)
Age: 27
Discharge: HOME | End: 2023-01-01
Attending: OBSTETRICS & GYNECOLOGY | Admitting: OBSTETRICS & GYNECOLOGY
Payer: COMMERCIAL

## 2022-12-31 LAB
AMPHETAMINE SCREEN, URINE: NORMAL
BACTERIA: ABNORMAL /HPF
BARBITURATE SCREEN URINE: NORMAL
BENZODIAZEPINE SCREEN, URINE: NORMAL
BILIRUBIN URINE: NEGATIVE
BLOOD, URINE: NEGATIVE
CANNABINOID SCREEN URINE: NORMAL
CLARITY: ABNORMAL
COCAINE METABOLITE SCREEN URINE: NORMAL
COLOR: YELLOW
EPITHELIAL CELLS, UA: ABNORMAL /HPF (ref 0–5)
FENTANYL SCREEN, URINE: NORMAL
GLUCOSE URINE: NEGATIVE MG/DL
HYALINE CASTS: ABNORMAL /HPF (ref 0–5)
KETONES, URINE: NEGATIVE MG/DL
LEUKOCYTE ESTERASE, URINE: ABNORMAL
Lab: NORMAL
METHADONE SCREEN, URINE: NORMAL
NITRITE, URINE: NEGATIVE
OPIATE SCREEN URINE: NORMAL
OXYCODONE URINE: NORMAL
PH UA: 8 (ref 5–9)
PHENCYCLIDINE SCREEN URINE: NORMAL
PROPOXYPHENE SCREEN: NORMAL
PROTEIN UA: NEGATIVE MG/DL
RBC UA: ABNORMAL /HPF (ref 0–5)
SPECIFIC GRAVITY UA: 1.01 (ref 1–1.03)
URINE REFLEX TO CULTURE: YES
UROBILINOGEN, URINE: 0.2 E.U./DL
WBC UA: ABNORMAL /HPF (ref 0–5)

## 2022-12-31 PROCEDURE — 87086 URINE CULTURE/COLONY COUNT: CPT

## 2022-12-31 PROCEDURE — 99283 EMERGENCY DEPT VISIT LOW MDM: CPT

## 2022-12-31 PROCEDURE — 81001 URINALYSIS AUTO W/SCOPE: CPT

## 2022-12-31 PROCEDURE — 80307 DRUG TEST PRSMV CHEM ANLYZR: CPT

## 2022-12-31 PROCEDURE — 59025 FETAL NON-STRESS TEST: CPT

## 2022-12-31 PROCEDURE — 2580000003 HC RX 258: Performed by: OBSTETRICS & GYNECOLOGY

## 2022-12-31 RX ORDER — SODIUM CHLORIDE, SODIUM LACTATE, POTASSIUM CHLORIDE, CALCIUM CHLORIDE 600; 310; 30; 20 MG/100ML; MG/100ML; MG/100ML; MG/100ML
INJECTION, SOLUTION INTRAVENOUS CONTINUOUS
Status: DISCONTINUED | OUTPATIENT
Start: 2022-12-31 | End: 2023-01-01 | Stop reason: HOSPADM

## 2022-12-31 RX ADMIN — SODIUM CHLORIDE, POTASSIUM CHLORIDE, SODIUM LACTATE AND CALCIUM CHLORIDE: 600; 310; 30; 20 INJECTION, SOLUTION INTRAVENOUS at 16:18

## 2022-12-31 NOTE — FLOWSHEET NOTE
Patient rates her pain a 7.5 out of ten at this time. Patient states she does not want any pain medications at this time.

## 2022-12-31 NOTE — PROGRESS NOTES
Pt here with complaints of lower abdominal pain /pressure since about 5 am. Had some pink spotting this am, no leaking, feels fetal movement. Denies problems with pregnancy. Urine sent and placed on monitor.

## 2022-12-31 NOTE — PROGRESS NOTES
Pt states she is still having pain, but not worse. Sve slight change and some bloody show with exam. Pt states she is afraid to go home, then she wont have a sitter. Discussed with dr johnson, Will walk for an hour and recheck again.

## 2022-12-31 NOTE — H&P
Obstetrical History and Physical        CHIEF COMPLAINT:  contractions    HISTORY OF PRESENT ILLNESS:      The patient is a 33 y.o. female at 37w1d [id number]    Patient presents with a chief complaint as above and is being admitted for labor  Denies DFM/VB/LOF/HA/EpigastricPain/Visual changes     Estimated Due Date: Estimated Date of Delivery: 23        PRENATAL CARE:  Pregnancy Complications:   None      PAST OB HISTORY:  OB History    Para Term  AB Living   4 2 2   1 2   SAB IAB Ectopic Molar Multiple Live Births   1       0 2      # Outcome Date GA Lbr Santosh/2nd Weight Sex Delivery Anes PTL Lv   4 Current            3 Term 20 39w1d / 00:05 6 lb 7 oz (2.92 kg) F Vag-Spont EPI N SUHA   2 Term 16 38w6d   F Vag-Spont   SUHA   1 SAB                   Past Medical History:        Diagnosis Date    Postpartum depression     Spontaneous       Past Surgical History:    No past surgical history on file. Allergies:    Patient has no known allergies.   Social History:    Social History     Socioeconomic History    Marital status: Single     Spouse name: Not on file    Number of children: Not on file    Years of education: Not on file    Highest education level: Not on file   Occupational History    Not on file   Tobacco Use    Smoking status: Never    Smokeless tobacco: Never   Vaping Use    Vaping Use: Never used   Substance and Sexual Activity    Alcohol use: No     Alcohol/week: 0.0 standard drinks    Drug use: Never    Sexual activity: Yes     Partners: Male   Other Topics Concern    Not on file   Social History Narrative    Not on file     Social Determinants of Health     Financial Resource Strain: Not on file   Food Insecurity: Not on file   Transportation Needs: Not on file   Physical Activity: Not on file   Stress: Not on file   Social Connections: Not on file   Intimate Partner Violence: Not on file   Housing Stability: Not on file     Family History:       Problem Relation Age of Onset    Breast Cancer Neg Hx     Ovarian Cancer Neg Hx     Cervical Cancer Neg Hx     Uterine Cancer Neg Hx      Medications Prior to Admission:  Medications Prior to Admission: metoclopramide (REGLAN) 10 MG tablet, Take 1 tablet by mouth 4 times daily (Patient not taking: Reported on 2022)  ferrous sulfate (FE TABS) 325 (65 Fe) MG EC tablet, Take 1 tablet by mouth 2 times daily (Patient not taking: Reported on 2022)    REVIEW OF SYSTEMS:   Const:            Negative  HEENT:  Negative  Resp:   Negative  CVS:   Negative  GI:  Negative  :  Negative  MSK:    Negative  Breast: Negative  Skin:  Negative  Heme/Lymph:Negative  Endo:  Negative  Neuro:  Negative  Psych: Negative    PHYSICAL EXAM:  Pulse 90   Temp 98.7 °F (37.1 °C) (Oral)   Resp 16   LMP 2022 (Approximate)     General appearance:  awake, alert, cooperative, no apparent distress, and appears stated age  Neurologic:  Awake, alert, oriented to name, place and time. Lungs:  No increased work of breathing, good air exchange  Abdomen:  Soft, non tender, gravid, consistent with her gestational age  Sterile Speculum Exam:    Membranes:  Intact   HSV Lesions: Absent   Cervix: 3/50/-3  Contraction frequency: iregular but painful    Labs:   Blood Type/Rh:  AB Positive  Group B Strep:  Pending      Fetus:   EFW: 7lbs 6oz by exam    Presentation: vertex by exam        ASSESSMENT AND PLAN:    1. IUP at 37w4d  Admission: Admit to L&D   FHR:  Category 1  Celestone: not indicated  Pain control plan: desires none  Delivery Plan: expectant   GBS: GBS pending  LARC: Ines Acevedo.  German Kaufman MBA, [facility]  2022

## 2023-01-01 VITALS
DIASTOLIC BLOOD PRESSURE: 58 MMHG | RESPIRATION RATE: 18 BRPM | SYSTOLIC BLOOD PRESSURE: 115 MMHG | OXYGEN SATURATION: 97 % | HEART RATE: 98 BPM | TEMPERATURE: 98.3 F

## 2023-01-01 LAB — URINE CULTURE, ROUTINE: NORMAL

## 2023-01-01 PROCEDURE — 59025 FETAL NON-STRESS TEST: CPT

## 2023-01-01 PROCEDURE — 6360000002 HC RX W HCPCS: Performed by: OBSTETRICS & GYNECOLOGY

## 2023-01-01 PROCEDURE — 2580000003 HC RX 258: Performed by: OBSTETRICS & GYNECOLOGY

## 2023-01-01 RX ORDER — CEPHALEXIN 500 MG/1
500 CAPSULE ORAL 2 TIMES DAILY
Qty: 14 CAPSULE | Refills: 0 | Status: SHIPPED | OUTPATIENT
Start: 2023-01-01

## 2023-01-01 RX ADMIN — CEFAZOLIN 2000 MG: 10 INJECTION, POWDER, FOR SOLUTION INTRAVENOUS at 08:42

## 2023-01-01 RX ADMIN — SODIUM CHLORIDE, POTASSIUM CHLORIDE, SODIUM LACTATE AND CALCIUM CHLORIDE: 600; 310; 30; 20 INJECTION, SOLUTION INTRAVENOUS at 02:02

## 2023-01-01 NOTE — PLAN OF CARE
Problem: Pain  Goal: Verbalizes/displays adequate comfort level or baseline comfort level  1/1/2023 0928 by Terry Johnson RN  Outcome: Adequate for Discharge  12/31/2022 1951 by Ronal Lora RN  Outcome: Progressing

## 2023-01-01 NOTE — DISCHARGE SUMMARY
Obstetric Discharge Summary    Reasons for Admission on 2022 12:18 PM  Labor threatened at term    Prenatal Procedures  None    Intrapartum Procedures  Pt was admitted for labor threatened at term. She was observed  overnight. In the am contractions abated. Fetal NST was reactive    Postpartum Procedures  None    [place] Data Pt discharged undelived  This patient has no babies on file. Discharge Diagnosis  Patient Active Problem List    Diagnosis Date Noted    Normal labor 2020    False labor after 37 completed weeks of gestation 2020    Premature uterine contractions     36 weeks gestation of pregnancy      uterine contractions, antepartum, third trimester     Positive fetal fibronectin at 22 weeks to 34 weeks gestation     28 weeks gestation of pregnancy        Discharge Information  Current Discharge Medication List        START taking these medications    Details   cephALEXin (KEFLEX) 500 MG capsule Take 1 capsule by mouth 2 times daily  Qty: 14 capsule, Refills: 0           STOP taking these medications       metoclopramide (REGLAN) 10 MG tablet Comments:   Reason for Stopping:         ferrous sulfate (FE TABS) 325 (65 Fe) MG EC tablet Comments:   Reason for Stopping:               No discharge procedures on file. Discharge to: Home    Condition on discharge: Stable    Discharge Date: 2023      Hema Hinojosa MBA, [facility]  2023

## 2023-01-01 NOTE — FLOWSHEET NOTE
AM assessment completed pt denies increased discomfort. Monitors off for breakfast. Dr Geraldine Calero made aware of discomfort and 1 dose of Ancef to be given.

## 2023-01-01 NOTE — PROGRESS NOTES
Department of Obstetrics and Gynecology  Labor and Delivery Triage Note        CHIEF COMPLAINT: pain much better this am    HISTORY OF PRESENT ILLNESS:      The patient is a 33 y.o.  37w5d. Admitted for observation because of painful contractions and mild cervical change with some blood show. OB History          4    Para   2    Term   2            AB   1    Living   2         SAB   1    IAB        Ectopic        Molar        Multiple   0    Live Births   2              Patient presents with a chief complaint as above. Denies DFM/VB/LOF/CTX    Estimated Due Date:  Estimated Date of Delivery: 23    PAST MEDICAL HISTORY:   Past Medical History:   Diagnosis Date    Postpartum depression     Spontaneous         PAST  SURGICAL HISTORY: No past surgical history on file. SOCIAL HISTORY:     reports that she has never smoked. She has never used smokeless tobacco. She reports that she does not drink alcohol and does not use drugs. MEDICATIONS:    Prior to Admission medications    Medication Sig Start Date End Date Taking?  Authorizing Provider   metoclopramide (REGLAN) 10 MG tablet Take 1 tablet by mouth 4 times daily  Patient not taking: Reported on 2022   GENO Blanchard - CNP   ferrous sulfate (FE TABS) 325 (65 Fe) MG EC tablet Take 1 tablet by mouth 2 times daily  Patient not taking: Reported on 2022 3/13/20   Claudia Leo DO        PRENATAL CARE:    Complicated by: none    REVIEW OF SYSTEMS:     Respiratory: negative  Cardiovascular: negative  Gastrointestinal: negative    APPEARANCE:      Pain:  yes 6/10 this am markedly reduced      PHYSICAL EXAM:    Vital Signs: VS wnl-reviewed/Respirations normal effort    BP (!) 115/58   Pulse 98   Temp 98.3 °F (36.8 °C) (Oral)   Resp 18   LMP 2022 (Approximate)   SpO2 97%     Abdomen: soft, NT, ND, no rebound/guarding  Uterus:  gravid/non-tender  LE Edema: trace  Fetal heart rate:  Category I  Cervix:  Not reexamined 3/50/-3 last pm   Contraction frequency:  none  Membranes:  Intact      RESULTS:    NST: Reactive      GENERAL LABS:      Recent Results (from the past 24 hour(s))   Urinalysis with Reflex to Culture    Collection Time: 12/31/22 12:33 PM    Specimen: Urine   Result Value Ref Range    Color, UA Yellow Straw/Yellow    Clarity, UA CLOUDY (A) Clear    Glucose, Ur Negative Negative mg/dL    Bilirubin Urine Negative Negative    Ketones, Urine Negative Negative mg/dL    Specific Gravity, UA 1.009 1.005 - 1.030    Blood, Urine Negative Negative    pH, UA 8.0 5.0 - 9.0    Protein, UA Negative Negative mg/dL    Urobilinogen, Urine 0.2 <2.0 E.U./dL    Nitrite, Urine Negative Negative    Leukocyte Esterase, Urine LARGE (A) Negative    Urine Reflex to Culture Yes    Urine Drug Screen    Collection Time: 12/31/22 12:33 PM   Result Value Ref Range    Amphetamine Screen, Urine Neg Negative <1000 ng/mL    Barbiturate Screen, Ur Neg Negative < 200 ng/mL    Benzodiazepine Screen, Urine Neg Negative < 200 ng/mL    Cannabinoid Scrn, Ur Neg Negative < 50 ng/mL    Cocaine Metabolite Screen, Urine Neg Negative < 300 ng/mL    Opiate Scrn, Ur Neg Negative < 300 ng/mL    PCP Screen, Urine Neg Negative < 25 ng/mL    Methadone Screen, Urine Neg Negative <300 ng/mL    Propoxyphene Scrn, Ur Neg Negative <300 ng/mL    Oxycodone Urine Neg Negative <100 ng/mL    FENTANYL SCREEN, URINE Neg Negative < 50 ng/mL    Drug Screen Comment: see below    Microscopic Urinalysis    Collection Time: 12/31/22 12:33 PM   Result Value Ref Range    Bacteria, UA MANY (A) Negative /HPF    Hyaline Casts, UA 1-3 0 - 5 /HPF    WBC, UA 20-50 (A) 0 - 5 /HPF    RBC, UA 0-2 0 - 5 /HPF    Epithelial Cells, UA 10-20 0 - 5 /HPF         IMPRESSION:     IUP at 37w5d  Labor threatened at term  UTI in pregnancy antepartum        DISPOSITION:  Discharge to Home  Keep appointment with [facility] provider     Discharge Information  Current Discharge Medication List Keflex 500 mg PO BID x 7 days     CONTINUE these medications which have NOT CHANGED    Details   metoclopramide (REGLAN) 10 MG tablet Take 1 tablet by mouth 4 times daily  Qty: 120 tablet, Refills: 0      ferrous sulfate (FE TABS) 325 (65 Fe) MG EC tablet Take 1 tablet by mouth 2 times daily  Qty: 90 tablet, Refills: 3             Pt instructed to return if:     - Leaking fluid  - Vaginal bleeding  - Regular contractions:  Every 5 minutes or closer for one hour  - Decreased fetal movement  - Worsening abdominal (belly) pain  - Headache, blurry vision, increased swelling, upper abdominal pain    Abdifatah Real MBA, FACOOG, FACOG  January 1, 2023 8:05 AM

## 2023-01-05 ENCOUNTER — HOSPITAL ENCOUNTER (OUTPATIENT)
Age: 28
Discharge: HOME | End: 2023-01-05
Attending: OBSTETRICS & GYNECOLOGY | Admitting: OBSTETRICS & GYNECOLOGY
Payer: COMMERCIAL

## 2023-01-05 PROCEDURE — 99283 EMERGENCY DEPT VISIT LOW MDM: CPT

## 2023-01-05 PROCEDURE — 99213 OFFICE O/P EST LOW 20 MIN: CPT | Performed by: OBSTETRICS & GYNECOLOGY

## 2023-01-05 PROCEDURE — 59025 FETAL NON-STRESS TEST: CPT

## 2023-01-05 ASSESSMENT — ENCOUNTER SYMPTOMS: SHORTNESS OF BREATH: 0

## 2023-01-05 NOTE — PROGRESS NOTES
Randee Lee is a 33 y.o. female patient. No current facility-administered medications for this encounter. No Known Allergies  Active Problems:    * No active hospital problems. *  Resolved Problems:    * No resolved hospital problems. *    Last menstrual period 04/12/2022, unknown if currently breastfeeding. Subjective:  Symptoms:  Resolved. No shortness of breath, chest pain or weakness. (Presents with c/o vaginal spotting since prior triage admissions). Diet:  Adequate intake. Activity level: Normal.    Pain:  She complains of pain that is mild. She reports pain is unchanged. Pain is well controlled. Objective:  General Appearance:  Comfortable, well-appearing, in no acute distress and not in pain. Vital signs: (most recent): Last menstrual period 04/12/2022, unknown if currently breastfeeding. Vital signs are normal.    HEENT: Normal HEENT exam.    Lungs:  Normal effort. Abdomen: Abdomen is soft and non-distended. There is no abdominal tenderness. Extremities: Normal range of motion. Neurological: Patient is alert and oriented to person, place and time. Normal strength. Skin:  Warm and dry. Assessment:    Condition: In stable condition. Unchanged. (IUP at term, false labor, d/c home). Plan:   Discharge home.        Jessica Barbosa MD  1/5/2023

## 2023-01-05 NOTE — PROGRESS NOTES
Dr Remberto Driscoll in to see pt, discussed labor precautions, pt has appt next wed with dr Paradise Goodman. Pt to be discharged home.

## 2023-01-05 NOTE — PROGRESS NOTES
Pt was here Saturday with ctx and stayed overnight, did not progress. States she was seen in office yesterday and was still 3, but has been bleeding since the weekend, pinkish and brownish. Pt states \"some ctx' no leaking , feels movement.  Rates pain with ctx 4/10

## 2023-01-11 ENCOUNTER — APPOINTMENT (OUTPATIENT)
Dept: LABOR AND DELIVERY | Age: 28
DRG: 560 | End: 2023-01-11
Payer: COMMERCIAL

## 2023-01-11 ENCOUNTER — ANESTHESIA EVENT (OUTPATIENT)
Dept: LABOR AND DELIVERY | Age: 28
DRG: 560 | End: 2023-01-11
Payer: COMMERCIAL

## 2023-01-11 ENCOUNTER — ANESTHESIA (OUTPATIENT)
Dept: LABOR AND DELIVERY | Age: 28
DRG: 560 | End: 2023-01-11
Payer: COMMERCIAL

## 2023-01-11 ENCOUNTER — HOSPITAL ENCOUNTER (INPATIENT)
Age: 28
LOS: 2 days | Discharge: HOME | DRG: 560 | End: 2023-01-13
Attending: OBSTETRICS & GYNECOLOGY | Admitting: OBSTETRICS & GYNECOLOGY
Payer: COMMERCIAL

## 2023-01-11 LAB
ABO/RH: NORMAL
ALBUMIN SERPL-MCNC: 3.5 G/DL (ref 3.5–4.6)
ALP BLD-CCNC: 129 U/L (ref 40–130)
ALT SERPL-CCNC: 6 U/L (ref 0–33)
AMPHETAMINE SCREEN, URINE: NORMAL
ANION GAP SERPL CALCULATED.3IONS-SCNC: 14 MEQ/L (ref 9–15)
ANTIBODY SCREEN: NORMAL
AST SERPL-CCNC: 13 U/L (ref 0–35)
BACTERIA: ABNORMAL /HPF
BARBITURATE SCREEN URINE: NORMAL
BASOPHILS ABSOLUTE: 0 K/UL (ref 0–0.2)
BASOPHILS RELATIVE PERCENT: 0.4 %
BENZODIAZEPINE SCREEN, URINE: NORMAL
BILIRUB SERPL-MCNC: 0.6 MG/DL (ref 0.2–0.7)
BILIRUBIN URINE: NEGATIVE
BLOOD, URINE: ABNORMAL
BUN BLDV-MCNC: 7 MG/DL (ref 6–20)
CALCIUM SERPL-MCNC: 8.8 MG/DL (ref 8.5–9.9)
CANNABINOID SCREEN URINE: NORMAL
CHLORIDE BLD-SCNC: 105 MEQ/L (ref 95–107)
CLARITY: ABNORMAL
CO2: 17 MEQ/L (ref 20–31)
COCAINE METABOLITE SCREEN URINE: NORMAL
COLOR: ABNORMAL
CREAT SERPL-MCNC: 0.59 MG/DL (ref 0.5–0.9)
EOSINOPHILS ABSOLUTE: 0 K/UL (ref 0–0.7)
EOSINOPHILS RELATIVE PERCENT: 0.1 %
EPITHELIAL CELLS, UA: ABNORMAL /HPF (ref 0–5)
FENTANYL SCREEN, URINE: NORMAL
GFR SERPL CREATININE-BSD FRML MDRD: >60 ML/MIN/{1.73_M2}
GLOBULIN: 3 G/DL (ref 2.3–3.5)
GLUCOSE BLD-MCNC: 116 MG/DL (ref 70–99)
GLUCOSE URINE: NEGATIVE MG/DL
HCT VFR BLD CALC: 32.6 % (ref 37–47)
HEMOGLOBIN: 10.6 G/DL (ref 12–16)
HEPATITIS B SURFACE ANTIGEN INTERPRETATION: NORMAL
HYALINE CASTS: ABNORMAL /LPF (ref 0–5)
KETONES, URINE: 40 MG/DL
LEUKOCYTE ESTERASE, URINE: ABNORMAL
LYMPHOCYTES ABSOLUTE: 1.3 K/UL (ref 1–4.8)
LYMPHOCYTES RELATIVE PERCENT: 10 %
Lab: NORMAL
MCH RBC QN AUTO: 25.7 PG (ref 27–31.3)
MCHC RBC AUTO-ENTMCNC: 32.5 % (ref 33–37)
MCV RBC AUTO: 79.2 FL (ref 79.4–94.8)
METHADONE SCREEN, URINE: NORMAL
MONOCYTES ABSOLUTE: 0.5 K/UL (ref 0.2–0.8)
MONOCYTES RELATIVE PERCENT: 3.9 %
MUCUS: PRESENT /LPF
NEUTROPHILS ABSOLUTE: 11.1 K/UL (ref 1.4–6.5)
NEUTROPHILS RELATIVE PERCENT: 85.6 %
NITRITE, URINE: NEGATIVE
OPIATE SCREEN URINE: NORMAL
OXYCODONE URINE: NORMAL
PDW BLD-RTO: 15.7 % (ref 11.5–14.5)
PH UA: 6 (ref 5–9)
PHENCYCLIDINE SCREEN URINE: NORMAL
PLATELET # BLD: 206 K/UL (ref 130–400)
POTASSIUM SERPL-SCNC: 3.4 MEQ/L (ref 3.4–4.9)
PROPOXYPHENE SCREEN: NORMAL
PROTEIN UA: 30 MG/DL
RBC # BLD: 4.11 M/UL (ref 4.2–5.4)
RBC UA: ABNORMAL /HPF (ref 0–5)
RUBELLA ANTIBODY IGG: 44.3 IU/ML
SARS-COV-2, NAAT: NOT DETECTED
SODIUM BLD-SCNC: 136 MEQ/L (ref 135–144)
SPECIFIC GRAVITY UA: 1.03 (ref 1–1.03)
TOTAL PROTEIN: 6.5 G/DL (ref 6.3–8)
URINE REFLEX TO CULTURE: YES
UROBILINOGEN, URINE: 1 E.U./DL
WBC # BLD: 12.9 K/UL (ref 4.8–10.8)
WBC UA: >100 /HPF (ref 0–5)

## 2023-01-11 PROCEDURE — 86762 RUBELLA ANTIBODY: CPT

## 2023-01-11 PROCEDURE — 1220000000 HC SEMI PRIVATE OB R&B

## 2023-01-11 PROCEDURE — 81001 URINALYSIS AUTO W/SCOPE: CPT

## 2023-01-11 PROCEDURE — 80053 COMPREHEN METABOLIC PANEL: CPT

## 2023-01-11 PROCEDURE — 85025 COMPLETE CBC W/AUTO DIFF WBC: CPT

## 2023-01-11 PROCEDURE — 87086 URINE CULTURE/COLONY COUNT: CPT

## 2023-01-11 PROCEDURE — 87635 SARS-COV-2 COVID-19 AMP PRB: CPT

## 2023-01-11 PROCEDURE — 86901 BLOOD TYPING SEROLOGIC RH(D): CPT

## 2023-01-11 PROCEDURE — 36415 COLL VENOUS BLD VENIPUNCTURE: CPT

## 2023-01-11 PROCEDURE — 87389 HIV-1 AG W/HIV-1&-2 AB AG IA: CPT

## 2023-01-11 PROCEDURE — 3700000025 EPIDURAL BLOCK: Performed by: NURSE ANESTHETIST, CERTIFIED REGISTERED

## 2023-01-11 PROCEDURE — 6370000000 HC RX 637 (ALT 250 FOR IP): Performed by: OBSTETRICS & GYNECOLOGY

## 2023-01-11 PROCEDURE — 80307 DRUG TEST PRSMV CHEM ANLYZR: CPT

## 2023-01-11 PROCEDURE — 86900 BLOOD TYPING SEROLOGIC ABO: CPT

## 2023-01-11 PROCEDURE — 86850 RBC ANTIBODY SCREEN: CPT

## 2023-01-11 PROCEDURE — 59409 OBSTETRICAL CARE: CPT | Performed by: OBSTETRICS & GYNECOLOGY

## 2023-01-11 PROCEDURE — 87340 HEPATITIS B SURFACE AG IA: CPT

## 2023-01-11 PROCEDURE — 86592 SYPHILIS TEST NON-TREP QUAL: CPT

## 2023-01-11 PROCEDURE — 2500000003 HC RX 250 WO HCPCS: Performed by: NURSE ANESTHETIST, CERTIFIED REGISTERED

## 2023-01-11 PROCEDURE — 6360000002 HC RX W HCPCS: Performed by: OBSTETRICS & GYNECOLOGY

## 2023-01-11 PROCEDURE — 59025 FETAL NON-STRESS TEST: CPT | Performed by: OBSTETRICS & GYNECOLOGY

## 2023-01-11 PROCEDURE — 2580000003 HC RX 258: Performed by: OBSTETRICS & GYNECOLOGY

## 2023-01-11 PROCEDURE — 10907ZC DRAINAGE OF AMNIOTIC FLUID, THERAPEUTIC FROM PRODUCTS OF CONCEPTION, VIA NATURAL OR ARTIFICIAL OPENING: ICD-10-PCS | Performed by: OBSTETRICS & GYNECOLOGY

## 2023-01-11 RX ORDER — SODIUM CHLORIDE 0.9 % (FLUSH) 0.9 %
5-40 SYRINGE (ML) INJECTION EVERY 12 HOURS SCHEDULED
Status: DISCONTINUED | OUTPATIENT
Start: 2023-01-11 | End: 2023-01-13 | Stop reason: HOSPADM

## 2023-01-11 RX ORDER — NALBUPHINE HYDROCHLORIDE 20 MG/ML
5 INJECTION, SOLUTION INTRAMUSCULAR; INTRAVENOUS; SUBCUTANEOUS
Status: DISCONTINUED | OUTPATIENT
Start: 2023-01-11 | End: 2023-01-11

## 2023-01-11 RX ORDER — DOCUSATE SODIUM 100 MG/1
100 CAPSULE, LIQUID FILLED ORAL 2 TIMES DAILY PRN
Status: DISCONTINUED | OUTPATIENT
Start: 2023-01-11 | End: 2023-01-11

## 2023-01-11 RX ORDER — MODIFIED LANOLIN
OINTMENT (GRAM) TOPICAL PRN
Status: DISCONTINUED | OUTPATIENT
Start: 2023-01-11 | End: 2023-01-13 | Stop reason: HOSPADM

## 2023-01-11 RX ORDER — SODIUM CHLORIDE 9 MG/ML
25 INJECTION, SOLUTION INTRAVENOUS PRN
Status: DISCONTINUED | OUTPATIENT
Start: 2023-01-11 | End: 2023-01-11

## 2023-01-11 RX ORDER — OXYCODONE HYDROCHLORIDE 5 MG/1
5 TABLET ORAL EVERY 4 HOURS PRN
Status: DISCONTINUED | OUTPATIENT
Start: 2023-01-11 | End: 2023-01-13 | Stop reason: HOSPADM

## 2023-01-11 RX ORDER — ACETAMINOPHEN 325 MG/1
650 TABLET ORAL EVERY 4 HOURS PRN
Status: DISCONTINUED | OUTPATIENT
Start: 2023-01-11 | End: 2023-01-11

## 2023-01-11 RX ORDER — DIPHENHYDRAMINE HCL 25 MG
25 TABLET ORAL EVERY 4 HOURS PRN
Status: DISCONTINUED | OUTPATIENT
Start: 2023-01-11 | End: 2023-01-11

## 2023-01-11 RX ORDER — SODIUM CHLORIDE, SODIUM LACTATE, POTASSIUM CHLORIDE, AND CALCIUM CHLORIDE .6; .31; .03; .02 G/100ML; G/100ML; G/100ML; G/100ML
500 INJECTION, SOLUTION INTRAVENOUS PRN
Status: DISCONTINUED | OUTPATIENT
Start: 2023-01-11 | End: 2023-01-11

## 2023-01-11 RX ORDER — OXYCODONE HYDROCHLORIDE 5 MG/1
10 TABLET ORAL EVERY 4 HOURS PRN
Status: DISCONTINUED | OUTPATIENT
Start: 2023-01-11 | End: 2023-01-13 | Stop reason: HOSPADM

## 2023-01-11 RX ORDER — CARBOPROST TROMETHAMINE 250 UG/ML
250 INJECTION, SOLUTION INTRAMUSCULAR PRN
Status: DISCONTINUED | OUTPATIENT
Start: 2023-01-11 | End: 2023-01-11

## 2023-01-11 RX ORDER — IBUPROFEN 600 MG/1
600 TABLET ORAL EVERY 8 HOURS SCHEDULED
Status: DISCONTINUED | OUTPATIENT
Start: 2023-01-11 | End: 2023-01-13 | Stop reason: HOSPADM

## 2023-01-11 RX ORDER — ONDANSETRON 2 MG/ML
4 INJECTION INTRAMUSCULAR; INTRAVENOUS EVERY 6 HOURS PRN
Status: DISCONTINUED | OUTPATIENT
Start: 2023-01-11 | End: 2023-01-11

## 2023-01-11 RX ORDER — ONDANSETRON 4 MG/1
8 TABLET, ORALLY DISINTEGRATING ORAL EVERY 8 HOURS PRN
Status: DISCONTINUED | OUTPATIENT
Start: 2023-01-11 | End: 2023-01-13 | Stop reason: HOSPADM

## 2023-01-11 RX ORDER — LIDOCAINE HYDROCHLORIDE AND EPINEPHRINE 15; 5 MG/ML; UG/ML
INJECTION, SOLUTION EPIDURAL PRN
Status: DISCONTINUED | OUTPATIENT
Start: 2023-01-11 | End: 2023-01-11 | Stop reason: SDUPTHER

## 2023-01-11 RX ORDER — DOCUSATE SODIUM 100 MG/1
100 CAPSULE, LIQUID FILLED ORAL 2 TIMES DAILY
Status: DISCONTINUED | OUTPATIENT
Start: 2023-01-11 | End: 2023-01-13 | Stop reason: HOSPADM

## 2023-01-11 RX ORDER — METHYLERGONOVINE MALEATE 0.2 MG/ML
200 INJECTION INTRAVENOUS PRN
Status: DISCONTINUED | OUTPATIENT
Start: 2023-01-11 | End: 2023-01-11

## 2023-01-11 RX ORDER — FAMOTIDINE 20 MG/1
20 TABLET, FILM COATED ORAL 2 TIMES DAILY
Status: DISCONTINUED | OUTPATIENT
Start: 2023-01-11 | End: 2023-01-13 | Stop reason: HOSPADM

## 2023-01-11 RX ORDER — SODIUM CHLORIDE, SODIUM LACTATE, POTASSIUM CHLORIDE, AND CALCIUM CHLORIDE .6; .31; .03; .02 G/100ML; G/100ML; G/100ML; G/100ML
1000 INJECTION, SOLUTION INTRAVENOUS PRN
Status: DISCONTINUED | OUTPATIENT
Start: 2023-01-11 | End: 2023-01-11

## 2023-01-11 RX ORDER — SODIUM CHLORIDE 0.9 % (FLUSH) 0.9 %
5-40 SYRINGE (ML) INJECTION EVERY 12 HOURS SCHEDULED
Status: DISCONTINUED | OUTPATIENT
Start: 2023-01-11 | End: 2023-01-11

## 2023-01-11 RX ORDER — SODIUM CHLORIDE 0.9 % (FLUSH) 0.9 %
5-40 SYRINGE (ML) INJECTION PRN
Status: DISCONTINUED | OUTPATIENT
Start: 2023-01-11 | End: 2023-01-13 | Stop reason: HOSPADM

## 2023-01-11 RX ORDER — VALACYCLOVIR HYDROCHLORIDE 500 MG/1
500 TABLET, FILM COATED ORAL 2 TIMES DAILY
COMMUNITY

## 2023-01-11 RX ORDER — SODIUM CHLORIDE, SODIUM LACTATE, POTASSIUM CHLORIDE, CALCIUM CHLORIDE 600; 310; 30; 20 MG/100ML; MG/100ML; MG/100ML; MG/100ML
INJECTION, SOLUTION INTRAVENOUS CONTINUOUS
Status: DISCONTINUED | OUTPATIENT
Start: 2023-01-11 | End: 2023-01-11

## 2023-01-11 RX ORDER — SODIUM CHLORIDE, SODIUM LACTATE, POTASSIUM CHLORIDE, CALCIUM CHLORIDE 600; 310; 30; 20 MG/100ML; MG/100ML; MG/100ML; MG/100ML
INJECTION, SOLUTION INTRAVENOUS CONTINUOUS
Status: DISCONTINUED | OUTPATIENT
Start: 2023-01-11 | End: 2023-01-13 | Stop reason: HOSPADM

## 2023-01-11 RX ORDER — MISOPROSTOL 200 UG/1
800 TABLET ORAL PRN
Status: DISCONTINUED | OUTPATIENT
Start: 2023-01-11 | End: 2023-01-11

## 2023-01-11 RX ORDER — SODIUM CHLORIDE 0.9 % (FLUSH) 0.9 %
5-40 SYRINGE (ML) INJECTION PRN
Status: DISCONTINUED | OUTPATIENT
Start: 2023-01-11 | End: 2023-01-11

## 2023-01-11 RX ORDER — NALOXONE HYDROCHLORIDE 0.4 MG/ML
INJECTION, SOLUTION INTRAMUSCULAR; INTRAVENOUS; SUBCUTANEOUS PRN
Status: DISCONTINUED | OUTPATIENT
Start: 2023-01-11 | End: 2023-01-11

## 2023-01-11 RX ORDER — NALBUPHINE HYDROCHLORIDE 20 MG/ML
10 INJECTION, SOLUTION INTRAMUSCULAR; INTRAVENOUS; SUBCUTANEOUS
Status: COMPLETED | OUTPATIENT
Start: 2023-01-11 | End: 2023-01-11

## 2023-01-11 RX ORDER — SODIUM CHLORIDE 9 MG/ML
INJECTION, SOLUTION INTRAVENOUS PRN
Status: DISCONTINUED | OUTPATIENT
Start: 2023-01-11 | End: 2023-01-13 | Stop reason: HOSPADM

## 2023-01-11 RX ADMIN — Medication 1 MILLI-UNITS/MIN: at 17:17

## 2023-01-11 RX ADMIN — LIDOCAINE HYDROCHLORIDE AND EPINEPHRINE 5 ML: 15; 5 INJECTION, SOLUTION EPIDURAL at 18:35

## 2023-01-11 RX ADMIN — IBUPROFEN 600 MG: 600 TABLET, FILM COATED ORAL at 22:46

## 2023-01-11 RX ADMIN — NALBUPHINE HYDROCHLORIDE 10 MG: 20 INJECTION, SOLUTION INTRAMUSCULAR; INTRAVENOUS; SUBCUTANEOUS at 17:33

## 2023-01-11 RX ADMIN — Medication 5 ML: at 18:47

## 2023-01-11 RX ADMIN — SODIUM CHLORIDE, POTASSIUM CHLORIDE, SODIUM LACTATE AND CALCIUM CHLORIDE 1000 ML: 600; 310; 30; 20 INJECTION, SOLUTION INTRAVENOUS at 17:10

## 2023-01-11 RX ADMIN — Medication 10 ML/HR: at 18:48

## 2023-01-11 RX ADMIN — SODIUM CHLORIDE, POTASSIUM CHLORIDE, SODIUM LACTATE AND CALCIUM CHLORIDE: 600; 310; 30; 20 INJECTION, SOLUTION INTRAVENOUS at 17:44

## 2023-01-11 ASSESSMENT — PAIN SCALES - GENERAL
PAINLEVEL_OUTOF10: 1
PAINLEVEL_OUTOF10: 7

## 2023-01-11 ASSESSMENT — PAIN DESCRIPTION - LOCATION
LOCATION: ABDOMEN
LOCATION: ABDOMEN

## 2023-01-11 ASSESSMENT — PAIN DESCRIPTION - ORIENTATION: ORIENTATION: LOWER

## 2023-01-11 ASSESSMENT — PAIN DESCRIPTION - DESCRIPTORS
DESCRIPTORS: CRAMPING
DESCRIPTORS: CRAMPING

## 2023-01-11 NOTE — ANESTHESIA PRE PROCEDURE
Department of Anesthesiology  Preprocedure Note       Name:  Colleen Reyna   Age:  33 y.o.  :  1995                                          MRN:  [id number]         Date:  2023      Surgeon: * No surgeons listed *    Procedure: * No procedures listed *    Medications prior to admission:   Prior to Admission medications    Medication Sig Start Date End Date Taking?  Authorizing Provider   cephALEXin (KEFLEX) 500 MG capsule Take 1 capsule by mouth 2 times daily  Patient not taking: Reported on 2023   Racquel Officer,        Current medications:    Current Facility-Administered Medications   Medication Dose Route Frequency Provider Last Rate Last Admin    oxytocin (PITOCIN) 30 units in 500 mL infusion  1 gay-units/min IntraVENous Continuous Josetta Baize, DO        lactated ringers infusion   IntraVENous Continuous Josetta Baize, DO        lactated ringers bolus  500 mL IntraVENous PRN Josetta Baize, DO        Or    lactated ringers bolus  1,000 mL IntraVENous PRN Josetta Baize, DO        sodium chloride flush 0.9 % injection 5-40 mL  5-40 mL IntraVENous 2 times per day Josetta Baize, DO        sodium chloride flush 0.9 % injection 5-40 mL  5-40 mL IntraVENous PRN Josetta Baize, DO        0.9 % sodium chloride infusion  25 mL IntraVENous PRN Josetta Baize, DO        nalbuphine (NUBAIN) injection 5 mg  5 mg IntraMUSCular Once PRN Josetta Baize, DO        And    nalbuphine (NUBAIN) injection 5 mg  5 mg IntraVENous Once PRN Josetta Baize, DO        [facility]ron [facility] injection 4 mg  4 mg IntraVENous Q6H PRN [place] Cev[place], DO        diphenhydrAMINE (BENADRYL) tablet 25 mg  25 mg Oral Q4H PRN Josetta Baize, DO        methylergonovine (METHERGINE) injection 200 mcg  200 mcg IntraMUSCular PRN Josetta Baize, DO        carboprost (HEMABATE) injection 250 mcg  250 mcg IntraMUSCular PRN [place] Cevall[place], DO        miSOPROStol (CYTOTEC) tablet 800 mcg  800 mcg Rectal PRN Ariel Jewell, DO        tranexamic acid (CYKLOKAPRON) 1,000 mg in sodium chloride 0.9 % 110 mL IVPB (mini-bag)  1,000 mg IntraVENous Once PRN Ariel Jewell, DO        acetaminophen (TYLENOL) tablet 650 mg  650 mg Oral Q4H PRN Prudence Jesus Yanez DO        benzocaine-menthol (DERMOPLAST) 20-0.5 % spray   Topical PRN Ariel Jewell, DO        docusate sodium (COLACE) capsule 100 mg  100 mg Oral BID PRN Ariel Jewell, DO           Allergies:  No Known Allergies    Problem List:    Patient Active Problem List   Diagnosis Code     uterine contractions, antepartum, third trimester O47.03    Positive fetal fibronectin at 22 weeks to 34 weeks gestation O09.899, R87.89    32 weeks gestation of pregnancy Z3A.32    Premature uterine contractions O47.00    36 weeks gestation of pregnancy Z3A.36    False labor after 37 completed weeks of gestation O48.3    Normal labor O80, Z37.9    Normal labor and delivery O80       Past Medical History:        Diagnosis Date    Postpartum depression     Spontaneous         Past Surgical History:  History reviewed. No pertinent surgical history.     Social History:    Social History     Tobacco Use    Smoking status: Never    Smokeless tobacco: Never   Substance Use Topics    Alcohol use: No     Alcohol/week: 0.0 standard drinks                                Counseling given: Not Answered      Vital Signs (Current):   Vitals:    23 1549 23 1551   BP: 106/66 113/71   Pulse: 88 98   Resp: 18    Temp: 36.6 °C (97.9 °F)    TempSrc: Oral    SpO2: 98% 98%   Weight: 197 lb (89.4 kg)    Height: 5' 3\" (1.6 m)                                               BP Readings from Last 3 Encounters:   23 113/71   23 (!) 115/58   21 (!) 138/55       NPO Status:                                                                                 BMI:   Wt Readings from Last 3 Encounters:   23 197 lb (89.4 kg) 06/30/21 161 lb (73 kg)   11/26/20 161 lb (73 kg)     Body mass index is 34.9 kg/m². CBC:   Lab Results   Component Value Date/Time    WBC 10.1 06/30/2021 08:30 PM    RBC 5.09 06/30/2021 08:30 PM    RBC 0-5 12/01/2015 10:43 AM    HGB 14.6 06/30/2021 08:30 PM    HCT 43.0 06/30/2021 08:30 PM    MCV 84.6 06/30/2021 08:30 PM    RDW 13.2 06/30/2021 08:30 PM     06/30/2021 08:30 PM       CMP:   Lab Results   Component Value Date/Time     06/30/2021 08:30 PM    K 3.6 06/30/2021 08:30 PM     06/30/2021 08:30 PM    CO2 26 06/30/2021 08:30 PM    BUN 9 06/30/2021 08:30 PM    CREATININE 0.74 06/30/2021 08:30 PM    GFRAA >60.0 06/30/2021 08:30 PM    LABGLOM >60.0 06/30/2021 08:30 PM    GLUCOSE 82 06/30/2021 08:30 PM    PROT 8.0 06/30/2021 08:30 PM    CALCIUM 10.3 06/30/2021 08:30 PM    BILITOT 0.6 06/30/2021 08:30 PM    BILITOT NEGATIVE 12/01/2015 10:43 AM    ALKPHOS 66 06/30/2021 08:30 PM    AST 13 06/30/2021 08:30 PM    ALT 9 06/30/2021 08:30 PM       POC Tests: No results for input(s): POCGLU, POCNA, POCK, POCCL, POCBUN, POCHEMO, POCHCT in the last 72 hours.     Coags:   Lab Results   Component Value Date/Time    PROTIME 10.5 02/10/2014 01:41 PM    INR 1.0 02/10/2014 01:41 PM    APTT 25.5 02/10/2014 01:41 PM       HCG (If Applicable):   Lab Results   Component Value Date    PREGTESTUR NEGATIVE 08/16/2021        ABGs: No results found for: PHART, PO2ART, PPV9XIS, MQL2ZXP, BEART, E3YMAPUF     Type & Screen (If Applicable):  No results found for: LABABO, LABRH    Drug/Infectious Status (If Applicable):  No results found for: HIV, HEPCAB    COVID-19 Screening (If Applicable): No results found for: COVID19        Anesthesia Evaluation  Patient summary reviewed and Nursing notes reviewed no history of anesthetic complications:   Airway: Mallampati: II  TM distance: >3 FB   Neck ROM: full  Mouth opening: > = 3 FB   Dental:          Pulmonary:Negative Pulmonary ROS and normal exam    (+) decreased breath sounds Cardiovascular:Negative CV ROS                      Neuro/Psych:   Negative Neuro/Psych ROS              GI/Hepatic/Renal: Neg GI/Hepatic/Renal ROS            Endo/Other: Negative Endo/Other ROS                    Abdominal:   (+) obese,           Vascular: negative vascular ROS. Other Findings:           Anesthesia Plan      epidural     ASA 2           MIPS: Postoperative opioids intended and Prophylactic antiemetics administered. Anesthetic plan and risks discussed with patient.       Plan discussed with surgical team.          Post-op pain plan if not by surgeon: continuous epidural            Cleve Current, APRN - CRNA   1/11/2023

## 2023-01-11 NOTE — FLOWSHEET NOTE
BENJAMIN Herr notified that pt would like an epidural now. IV fluid bolus infused, lab results back.

## 2023-01-11 NOTE — PROGRESS NOTES
Call made to Dr. Jackson Stinson to inform of patient status, contraction pattern, and SVE. Inquired on patient being ruptured. Orders received to start pitocin, have patient get an epidural, and then have house doctor rupture patient.

## 2023-01-11 NOTE — ANESTHESIA PROCEDURE NOTES
Epidural Block    Patient location during procedure: OB  Start time: 1/11/2023 6:17 PM  End time: 1/11/2023 6:47 PM  Reason for block: labor epidural  Staffing  Performed: resident/CRNA   Resident/CRNA: GENO Schuster CRNA  Epidural  Patient position: sitting  Prep: Betadine  Patient monitoring: continuous pulse ox and frequent blood pressure checks  Approach: midline  Location: L4-5  Injection technique: REECE air  Provider prep: mask and sterile gloves  Needle  Needle type: Tuohy   Needle gauge: 17 G  Needle length: 3.5 in  Needle insertion depth: 7 cm  Catheter type: side hole  Catheter size: 20 G  Catheter at skin depth: 15 cm  Test dose: negative  Lot number: [id number]  Expiration date: 2/29/2024Catheter Secured: tegaderm and tape  Assessment  Sensory level: T6  Hemodynamics: stable  Attempts: 2  Outcomes: uncomplicated and patient tolerated procedure well  Additional Notes  Intended dural puncture, positive csf, epidural catheter threaded into the epidural space.    Preanesthetic Checklist  Completed: patient identified, IV checked, site marked, risks and benefits discussed, surgical/procedural consents, equipment checked, pre-op evaluation, timeout performed, anesthesia consent given, oxygen available, monitors applied/VS acknowledged, fire risk safety assessment completed and verbalized and blood product R/B/A discussed and consented

## 2023-01-11 NOTE — FLOWSHEET NOTE
1817-BENJAMIN Kunz at the bedside. 1818-pt sitting for epidural.  1820-Time out done  1835-test dose  1839-returned to a laying position/monitors adjusted.    1847-bolus  1849-Continuous epidural infusion at 10cc/hr

## 2023-01-11 NOTE — FLOWSHEET NOTE
Clean catch urine sample obtained. EFM explained and applied. Pt states that she was seen today in the office and was 3-4cm. States that she has been kaylene since noon. Denies any leaking of fluid or vaginal bleeding. Active fetal movement reported.

## 2023-01-12 LAB
HEMOGLOBIN: 10.4 G/DL (ref 12–16)
HIV AG/AB: NONREACTIVE
RPR: NORMAL

## 2023-01-12 PROCEDURE — 85018 HEMOGLOBIN: CPT

## 2023-01-12 PROCEDURE — 99024 POSTOP FOLLOW-UP VISIT: CPT | Performed by: OBSTETRICS & GYNECOLOGY

## 2023-01-12 PROCEDURE — 36415 COLL VENOUS BLD VENIPUNCTURE: CPT

## 2023-01-12 PROCEDURE — 6370000000 HC RX 637 (ALT 250 FOR IP): Performed by: OBSTETRICS & GYNECOLOGY

## 2023-01-12 PROCEDURE — 1220000000 HC SEMI PRIVATE OB R&B

## 2023-01-12 PROCEDURE — 2580000003 HC RX 258: Performed by: OBSTETRICS & GYNECOLOGY

## 2023-01-12 RX ORDER — IBUPROFEN 600 MG/1
600 TABLET ORAL EVERY 6 HOURS PRN
Qty: 120 TABLET | Refills: 3 | Status: SHIPPED | OUTPATIENT
Start: 2023-01-12 | End: 2023-01-13 | Stop reason: HOSPADM

## 2023-01-12 RX ADMIN — OXYCODONE 5 MG: 5 TABLET ORAL at 19:45

## 2023-01-12 RX ADMIN — SODIUM CHLORIDE, POTASSIUM CHLORIDE, SODIUM LACTATE AND CALCIUM CHLORIDE: 600; 310; 30; 20 INJECTION, SOLUTION INTRAVENOUS at 02:22

## 2023-01-12 RX ADMIN — OXYCODONE 5 MG: 5 TABLET ORAL at 03:37

## 2023-01-12 RX ADMIN — DOCUSATE SODIUM 100 MG: 100 CAPSULE, LIQUID FILLED ORAL at 07:53

## 2023-01-12 RX ADMIN — OXYCODONE 5 MG: 5 TABLET ORAL at 12:40

## 2023-01-12 RX ADMIN — IBUPROFEN 600 MG: 600 TABLET, FILM COATED ORAL at 14:03

## 2023-01-12 ASSESSMENT — PAIN DESCRIPTION - ORIENTATION
ORIENTATION: LOWER
ORIENTATION: ANTERIOR
ORIENTATION: ANTERIOR

## 2023-01-12 ASSESSMENT — PAIN SCALES - GENERAL
PAINLEVEL_OUTOF10: 5
PAINLEVEL_OUTOF10: 2
PAINLEVEL_OUTOF10: 5
PAINLEVEL_OUTOF10: 0
PAINLEVEL_OUTOF10: 4
PAINLEVEL_OUTOF10: 0

## 2023-01-12 ASSESSMENT — PAIN DESCRIPTION - DESCRIPTORS
DESCRIPTORS: CRAMPING
DESCRIPTORS: ACHING;CRAMPING
DESCRIPTORS: CRAMPING
DESCRIPTORS: CRAMPING

## 2023-01-12 ASSESSMENT — PAIN DESCRIPTION - LOCATION
LOCATION: ABDOMEN

## 2023-01-12 NOTE — L&D DELIVERY SUMMARY NOTE
Department of Obstetrics and Gynecology  Spontaneous Vaginal Delivery Note      Pt Name: Giovani Trujillo  MRN: [id number] Acct #: [de-identified]  YOB: 1995  Procedure Performed By: William Pan DO, MD      Pre-operative Diagnosis:  Term pregnancy  Post-operative Diagnosis: Same, delivered. Procedure:  Spontaneous vaginal delivery  Surgeon:  William Pan DO    Information for the patient's :  [place] [facility] [[id number]]        Anesthesia:  epidural anesthesia  Estimated blood loss:  300ml  Specimen:  Placenta not sent to pathology   Complications:  none  Condition:  infant stable to general nursery    Details of Procedure: The patient is a 33 y.o. female at 36w3d   OB History          5    Para   2    Term   2       0    AB   2    Living   2         SAB   1    IAB   1    Ectopic   0    Molar   0    Multiple   0    Live Births   2             who was admitted for induction. She received the following interventions: IV Pitocin augmentation. The patient progressed well,did receive an epidural, became complete and started to push. She was placed in the dorsal lithotomy position and prepped. She delivered the vertex over an intact perineum . The nose and mouth were suctioned with bulb suction and the rest of the infant delivered atraumatically, placed on mother abdomen. Cord was clamped and cut and infant handed off to the waiting nurse for evaluation after mom had adequate time for bonding unless needed to be evaluated sooner. The placenta delivered intact, whole and that the umbilical cord had three vessels noted. The perineum and vagina were explored and a no lacerations were found     A vaginal sweep was performed and there were no retained products. Sponge, needle and instrument counts were correct. Delivery Summary: viable female at . Apgar 8/9. Ebl 300cc. Fhr reactive.    Information for the patient's :  [place] [employer] [[id number]]              PMH:  Past Medical History:   Diagnosis Date    Postpartum depression     Spontaneous         Yesica Farley DO 2023 8:16 PM

## 2023-01-12 NOTE — PROGRESS NOTES
PROGRESS NOTE: POSTPARTUM DAY 1    Pt is doing well. Pt is ambulation and tolerating po. Pt bottle feeding without issue. Lochia wnl    BP (!) 116/59   Pulse 77   Temp 97.2 °F (36.2 °C) (Oral)   Resp 16   Ht 5' 3\" (1.6 m)   Wt 197 lb (89.4 kg)   LMP 04/12/2022 (Approximate)   SpO2 98%   Breastfeeding Unknown   BMI 34.90 kg/m²   Hemoglobin   Date Value Ref Range Status   01/12/2023 10.4 (L) 12.0 - 16.0 g/dL Final     CVS: RRR  Lungs: CTAB  ABD: Uterus firm at umbilicus  EXT: no c/c/e    33 y.o. T5R0604 now P3 s/p vaginal delivery doing well PPD#1  1. May discharge to home when infant released  2. Rx Ibuprofen to pharmacy   3.  F/u with Dr. Angela Sinclair in [place] MD

## 2023-01-12 NOTE — H&P
Department of Obstetrics and Gynecology   History & Physical    Pt Name: Brittaney Cody  MRN: [id number] Renetta #: [de-identified]  YOB: 1995  Estimated Date of Delivery: 1/17/23      HPI: The patient is a 33 y.o. U4T7418 female  who came over for induction of labor at 39 weeks    Allergies:     Allergies as of 01/05/2023    (No Known Allergies)       Medications:    Current Facility-Administered Medications:     oxytocin (PITOCIN) 30 units in 500 mL infusion, 1 gay-units/min, IntraVENous, Continuous, Game[place] , DO, Last Rate: 2 mL/hr at 01/11/23 1755, 2 gay-units/min at 01/11/23 1755    lactated ringers infusion, , IntraVENous, Continuous, Gamez , DO, Last Rate: 125 mL/hr at 01/11/23 1744, New Bag at 01/11/23 1744    lactated ringers bolus, 500 mL, IntraVENous, PRN **OR** lactated ringers bolus, 1,000 mL, IntraVENous, PRN, Gamez , DO, Last Rate: 500 mL/hr at 01/11/23 1710, 1,000 mL at 01/11/23 1710    sodium chloride flush 0.9 % injection 5-40 mL, 5-40 mL, IntraVENous, 2 times per day, Gamez , DO    sodium chloride flush 0.9 % injection 5-40 mL, 5-40 mL, IntraVENous, PRN, Walter Rafael Calmar, DO    0.9 % sodium chloride infusion, 25 mL, IntraVENous, PRN, Walter Rafael Calmar, DO    ondansetron [facility]) injection 4 mg, 4 mg, IntraVENous, Q6H PRN, Walter Rafael Renata, DO    diphenhydrAMINE (BENADRYL) tablet 25 mg, 25 mg, Oral, Q4H PRN, Walter Rafael Renata, DO    oxytocin (PITOCIN) 30 units in 500 mL infusion, 87.3 gay-units/min, IntraVENous, Continuous PRN **AND** oxytocin (PITOCIN) 10 unit bolus from the bag, 10 Units, IntraVENous, PRN, [place] , DO    methylergonovine (METHERGINE) injection 200 mcg, 200 mcg, IntraMUSCular, PRN, Walter Yanez,     carboprost (HEMABATE) injection 250 mcg, 250 mcg, IntraMUSCular, PRN, Walter Yanez, DO    miSOPROStol (CYTOTEC) tablet 800 mcg, 800 mcg, Rectal, PRN, Walter Yanez,     tranexamic acid (CYKLOKAPRON) 1,000 mg in sodium chloride 0.9 % 110 mL IVPB (mini-bag), 1,000 mg, IntraVENous, Once PRN, Peter Wild, DO    acetaminophen (TYLENOL) tablet 650 mg, 650 mg, Oral, Q4H PRN, Laura Yanez DO    benzocaine-menthol (DERMOPLAST) 20-0.5 % spray, , Topical, PRN, Laura Yanez, DO    docusate sodium (COLACE) capsule 100 mg, 100 mg, Oral, BID PRN, Laura Yanez,     naloxone 0.4 mg in 10 mL sodium chloride syringe, , IntraVENous, PRN, Laura Yanez, DO    ondansetron [facility] injection 4 mg, 4 mg, IntraVENous, Q6H PRN, Laura Yanez, DO    fentaNYL 125 mcg, ropivacaine 0.2% in sodium chloride 0.9% 127.5ml (OB) epidural, 12 mL/hr, Epidural, Continuous, Laura Yanez DO    Facility-Administered Medications Ordered in Other Encounters:     Lidocaine-EPINEPHrine 1.5 %-1:732021, , Epidural, PRN, Gena Sheets, APRN - CRNA, 5 mL at 23 1835    fentaNYL 125 mcg, ropivacaine 0.2% in sodium chloride 0.9% 127.5ml (OB) epidural, , Epidural, PRN, Gena Sheets, APRN - CRNA, 10 mL/hr at 23 1848    OB History: G2Y1892    Gyn History: Denies h/o abnormal pap smear, h/o STDs. Past Medical History:   Past Medical History:   Diagnosis Date    Postpartum depression     Spontaneous         Past Surgical History: History reviewed. No pertinent surgical history. Social History:   Social History     Tobacco Use   Smoking Status Never   Smokeless Tobacco Never        Family History: Noncontributory; Denies h/o cancer. ROS:  Negative except as stated in HPI, denies nausea, vomiting, fever, chills, headache or dysuria. PE:  Vitals:    23 1900   BP: (!) 111/57   Pulse: 85   Resp: 16   Temp:    SpO2: 97%       General: well nourished, well developed, in no acute distress  CV: Normal heart sounds  Resp: breathing unlabored  Abdomen: Nontender, no rebound, no guarding  FH:  Cx:9c.  Arom clear    NST - Cat 1: FHR 140s, moderate variability, +accels, -decels    Labs:       Assessment:   27 yelgin T3U0502 female with active labor    Plan: anticipate ROBE Reid, DO

## 2023-01-12 NOTE — ANESTHESIA POSTPROCEDURE EVALUATION
Department of Anesthesiology  Postprocedure Note    Patient: Robb Callahan  MRN: [id number]  YOB: 1995  Date of evaluation: 1/11/2023      Procedure Summary     Date: 01/11/23 Room / Location:     Anesthesia Start: 1817 Anesthesia Stop: 2005    Procedure: Labor Analgesia Diagnosis:     Scheduled Providers:  Responsible Provider: [place] GENO Olvera CRNA    Anesthesia Type: epidural ASA Status: 2          Anesthesia Type: No value filed.     Delia Phase I:      Delia Phase II:        Anesthesia Post Evaluation    Patient location during evaluation: bedside  Patient participation: complete - patient participated  Level of consciousness: awake and awake and alert  Airway patency: patent  Nausea & Vomiting: no nausea and no vomiting  Complications: no  Cardiovascular status: blood pressure returned to baseline and hemodynamically stable  Respiratory status: acceptable  Hydration status: euvolemic

## 2023-01-13 VITALS
WEIGHT: 197 LBS | OXYGEN SATURATION: 97 % | BODY MASS INDEX: 34.91 KG/M2 | HEIGHT: 63 IN | SYSTOLIC BLOOD PRESSURE: 118 MMHG | DIASTOLIC BLOOD PRESSURE: 63 MMHG | HEART RATE: 87 BPM | TEMPERATURE: 98.1 F | RESPIRATION RATE: 16 BRPM

## 2023-01-13 LAB — URINE CULTURE, ROUTINE: NORMAL

## 2023-01-13 PROCEDURE — 99024 POSTOP FOLLOW-UP VISIT: CPT | Performed by: OBSTETRICS & GYNECOLOGY

## 2023-01-13 PROCEDURE — 7200000001 HC VAGINAL DELIVERY

## 2023-01-13 PROCEDURE — 6370000000 HC RX 637 (ALT 250 FOR IP): Performed by: OBSTETRICS & GYNECOLOGY

## 2023-01-13 RX ORDER — IBUPROFEN 800 MG/1
800 TABLET ORAL EVERY 8 HOURS SCHEDULED
Qty: 30 TABLET | Refills: 1 | Status: SHIPPED | OUTPATIENT
Start: 2023-01-13 | End: 2023-01-23

## 2023-01-13 RX ADMIN — OXYCODONE 5 MG: 5 TABLET ORAL at 04:19

## 2023-01-13 RX ADMIN — FAMOTIDINE 20 MG: 20 TABLET, FILM COATED ORAL at 09:00

## 2023-01-13 RX ADMIN — DOCUSATE SODIUM 100 MG: 100 CAPSULE, LIQUID FILLED ORAL at 09:01

## 2023-01-13 ASSESSMENT — PAIN SCALES - GENERAL: PAINLEVEL_OUTOF10: 5

## 2023-01-13 ASSESSMENT — PAIN DESCRIPTION - LOCATION: LOCATION: ABDOMEN

## 2023-01-13 ASSESSMENT — PAIN DESCRIPTION - DESCRIPTORS: DESCRIPTORS: CRAMPING

## 2023-01-13 ASSESSMENT — PAIN DESCRIPTION - ORIENTATION: ORIENTATION: LOWER

## 2023-01-13 NOTE — DISCHARGE SUMMARY
Discharge Summary    Mikal Geller  :  1995  MRN:  [id number]    ADMIT DATE:  2023  DISCHARGE DATE:  2023    PRIMARY CARE PHYSICIAN:  GENO Ly NP    VISIT STATUS: Admission    CODE STATUS:  Prior    DISCHARGE DIAGNOSES:  Principal Problem:    Normal labor and delivery  Resolved Problems:    * No resolved hospital problems. *      HOSPITAL COURSE:  Patient presented for induction of labor, and all appropriate consents were signed. All of patient's questions were answered and she expressed understanding. She received Pitocin, amniotomy, and Epidural. Labor progressed without difficulty and she was delivered of viable Female infant 2023 (39.1 wks). Please refer to Delivery notes for further details. Postpartum course was uncomplicated and she achieved postpartum and postpartum goals. She was discharged home on PD#2, with instructions, medications and follow up with Dr. Wesley Roberson in 4-6 weeks for [facility] visit. RECOMMENDED NEXT STEPS:   Discharge home     DISCHARGE MEDICATIONS:         Medication List        START taking these medications      ibuprofen 600 MG tablet  Commonly known as: ADVIL;MOTRIN  Take 1 tablet by mouth every 6 hours as needed for Pain            CONTINUE taking these medications      cephALEXin 500 MG capsule  Commonly known as: KEFLEX  Take 1 capsule by mouth 2 times daily     valACYclovir 500 MG tablet  Commonly known as: VALTREX               Where to Get Your Medications        These medications were sent to [address] [place] [address] 1   Maddy BekahAissatou [facility]ajal      Phone: [phone]   ibuprofen 600 MG tablet         DIET: ADULT DIET;  Regular    ACTIVITY: Pelvic Rest  ______________________________________________________________________  COMPLEXITY OF FOLLOW UP:   [] Moderate Complexity: follow up within 7-14 calendar days (38930)   [] Severe Complexity: follow up within 7 calendar days (99140)    FOLLOW UP TESTING, PENDING RESULTS OR REFERRALS AT TRANSITIONAL CARE VISIT:   []  Yes    [x]  No        DISPOSITION: Home        Follow up with Jack Adhikari DO  [address] [facility]  [id number] Rick [address] [place] [address]  [phone]    Schedule an appointment as soon as possible for a visit in 6 week(s)        INSTRUCTIONS TO MA/SW: Please call patient on day after discharge (must document patient  contacted within 2 business days of discharge). FOLLOW UP QUESTIONS FOR MA/SW:  1. Did you get medications filled and taking them as instructed from discharge? 2. Are you following your discharge instructions from your hospital stay? 3. Please confirm patient is scheduled for a follow up appointment within the above time frame.     DISCHARGE TIME: > 30 minutes    SIGNED:  Rosendo Mccauley MD   1/13/2023, 7:59 AM
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
## 2023-01-13 NOTE — DISCHARGE SUMMARY
DISCHARGE SUMMARY:    Pt is doing well. Pt is ambulation and tolerating po. Pt bottle feeding without issue. Lochia wnl. Pt eager for discharge. /60   Pulse 65   Temp 98 °F (36.7 °C) (Oral)   Resp 18   Ht 5' 3\" (1.6 m)   Wt 197 lb (89.4 kg)   LMP 04/12/2022 (Approximate)   SpO2 98%   Breastfeeding Unknown   BMI 34.90 kg/m²   Hemoglobin   Date Value Ref Range Status   01/12/2023 10.4 (L) 12.0 - 16.0 g/dL Final     CVS: RRR  Lungs: CTAB  ABD: Uterus firm at umbilicus  EXT: no c/c/e    33 y.o. [id number] now P3 s/p vaginal delivery doing well PPD#1  1. May discharge to home when infant released  2. Rx Ibuprofen to pharmacy   3.  F/u with Dr. Ethel Zarco in [place]

## 2023-05-04 ENCOUNTER — HOSPITAL ENCOUNTER (EMERGENCY)
Age: 28
Discharge: HOME | End: 2023-05-04
Attending: EMERGENCY MEDICINE
Payer: COMMERCIAL

## 2023-05-04 VITALS
WEIGHT: 176 LBS | DIASTOLIC BLOOD PRESSURE: 83 MMHG | OXYGEN SATURATION: 98 % | RESPIRATION RATE: 16 BRPM | BODY MASS INDEX: 31.18 KG/M2 | HEART RATE: 85 BPM | HEIGHT: 63 IN | TEMPERATURE: 97.2 F | SYSTOLIC BLOOD PRESSURE: 123 MMHG

## 2023-05-04 DIAGNOSIS — N93.9 VAGINAL BLEEDING: Primary | ICD-10-CM

## 2023-05-04 LAB
ANION GAP SERPL CALCULATED.3IONS-SCNC: 8 MEQ/L (ref 9–15)
BACTERIA URNS QL MICRO: NEGATIVE /HPF
BASOPHILS # BLD: 0 K/UL (ref 0–0.2)
BASOPHILS NFR BLD: 0.7 %
BILIRUB UR QL STRIP: NEGATIVE
BUN SERPL-MCNC: 9 MG/DL (ref 6–20)
CALCIUM SERPL-MCNC: 9.1 MG/DL (ref 8.5–9.9)
CHLORIDE SERPL-SCNC: 106 MEQ/L (ref 95–107)
CLARITY UR: CLEAR
CO2 SERPL-SCNC: 26 MEQ/L (ref 20–31)
COLOR UR: YELLOW
CREAT SERPL-MCNC: 0.7 MG/DL (ref 0.5–0.9)
EOSINOPHIL # BLD: 0 K/UL (ref 0–0.7)
EOSINOPHIL NFR BLD: 0.7 %
EPI CELLS #/AREA URNS AUTO: ABNORMAL /HPF (ref 0–5)
ERYTHROCYTE [DISTWIDTH] IN BLOOD BY AUTOMATED COUNT: 15.7 % (ref 11.5–14.5)
GLUCOSE SERPL-MCNC: 114 MG/DL (ref 70–99)
GLUCOSE UR STRIP-MCNC: NEGATIVE MG/DL
HCG, URINE, POC: NEGATIVE
HCT VFR BLD AUTO: 37.4 % (ref 37–47)
HGB BLD-MCNC: 12.5 G/DL (ref 12–16)
HGB UR QL STRIP: ABNORMAL
HYALINE CASTS #/AREA URNS AUTO: ABNORMAL /HPF (ref 0–5)
KETONES UR STRIP-MCNC: ABNORMAL MG/DL
LEUKOCYTE ESTERASE UR QL STRIP: NEGATIVE
LYMPHOCYTES # BLD: 1.3 K/UL (ref 1–4.8)
LYMPHOCYTES NFR BLD: 23.1 %
Lab: NORMAL
MCH RBC QN AUTO: 27.7 PG (ref 27–31.3)
MCHC RBC AUTO-ENTMCNC: 33.5 % (ref 33–37)
MCV RBC AUTO: 82.6 FL (ref 79.4–94.8)
MONOCYTES # BLD: 0.4 K/UL (ref 0.2–0.8)
MONOCYTES NFR BLD: 6.2 %
NEGATIVE QC PASS/FAIL: NORMAL
NEUTROPHILS # BLD: 4 K/UL (ref 1.4–6.5)
NEUTS SEG NFR BLD: 69.3 %
NITRITE UR QL STRIP: NEGATIVE
PH UR STRIP: 5.5 [PH] (ref 5–9)
PLATELET # BLD AUTO: 185 K/UL (ref 130–400)
POSITIVE QC PASS/FAIL: NORMAL
POTASSIUM SERPL-SCNC: 3.9 MEQ/L (ref 3.4–4.9)
PROT UR STRIP-MCNC: NEGATIVE MG/DL
RBC # BLD AUTO: 4.52 M/UL (ref 4.2–5.4)
RBC #/AREA URNS AUTO: ABNORMAL /HPF (ref 0–5)
SODIUM SERPL-SCNC: 140 MEQ/L (ref 135–144)
SP GR UR STRIP: 1.03 (ref 1–1.03)
URINE REFLEX TO CULTURE: ABNORMAL
UROBILINOGEN UR STRIP-ACNC: 1 E.U./DL
WBC # BLD AUTO: 5.8 K/UL (ref 4.8–10.8)
WBC #/AREA URNS AUTO: ABNORMAL /HPF (ref 0–5)

## 2023-05-04 PROCEDURE — 36415 COLL VENOUS BLD VENIPUNCTURE: CPT

## 2023-05-04 PROCEDURE — 99283 EMERGENCY DEPT VISIT LOW MDM: CPT | Performed by: EMERGENCY MEDICINE

## 2023-05-04 PROCEDURE — 80048 BASIC METABOLIC PNL TOTAL CA: CPT

## 2023-05-04 PROCEDURE — 85025 COMPLETE CBC W/AUTO DIFF WBC: CPT

## 2023-05-04 PROCEDURE — 81001 URINALYSIS AUTO W/SCOPE: CPT

## 2023-05-04 RX ORDER — IBUPROFEN 400 MG/1
400 TABLET ORAL EVERY 8 HOURS PRN
Qty: 21 TABLET | Refills: 0 | Status: SHIPPED | OUTPATIENT
Start: 2023-05-04

## 2023-05-04 ASSESSMENT — PAIN - FUNCTIONAL ASSESSMENT: PAIN_FUNCTIONAL_ASSESSMENT: 0-10

## 2023-05-04 ASSESSMENT — PAIN SCALES - GENERAL: PAINLEVEL_OUTOF10: 6

## 2023-05-04 ASSESSMENT — PAIN DESCRIPTION - LOCATION: LOCATION: ABDOMEN

## 2023-05-04 ASSESSMENT — LIFESTYLE VARIABLES
HOW MANY STANDARD DRINKS CONTAINING ALCOHOL DO YOU HAVE ON A TYPICAL DAY: PATIENT DOES NOT DRINK
HOW OFTEN DO YOU HAVE A DRINK CONTAINING ALCOHOL: NEVER

## 2023-05-04 ASSESSMENT — PAIN DESCRIPTION - ONSET: ONSET: ON-GOING

## 2023-05-04 ASSESSMENT — PAIN DESCRIPTION - DESCRIPTORS: DESCRIPTORS: CRAMPING

## 2023-05-04 ASSESSMENT — PAIN DESCRIPTION - ORIENTATION: ORIENTATION: LOWER

## 2023-05-04 ASSESSMENT — PAIN DESCRIPTION - PAIN TYPE: TYPE: ACUTE PAIN

## 2023-05-04 ASSESSMENT — PAIN DESCRIPTION - FREQUENCY: FREQUENCY: INTERMITTENT

## 2023-05-04 NOTE — ED PROVIDER NOTES
[address] [facility] ED  EMERGENCY DEPARTMENT ENCOUNTER      Pt Name: Coral Call  MRN: [id number]  Little 1995  Date of evaluation: 2023  Provider: Anny Jackson MD    CHIEF COMPLAINT       Chief Complaint   Patient presents with    Vaginal Bleeding     Vagina bleeding. Pt sates she has filled 4 pads in a hour. HISTORY OF PRESENT ILLNESS   (Location/Symptom, Timing/Onset, Context/Setting, Quality, Duration, Modifying Factors, Severity)  Note limiting factors. 31-year-old female presenting with vaginal bleeding. For the last couple of days she has soaked through multiple pads. Called her OB doctor who directed her here. Notes vaginal birth months prior that was without complications. Has been having normal periods since for the last few months. Took an ibuprofen and symptoms have improved. Does not believe she is again pregnant. Nursing Notes were reviewed. REVIEW OF SYSTEMS    (2-9 systems for level 4, 10 or more for level 5)     Review of Systems   Genitourinary:  Positive for vaginal bleeding. All other systems reviewed and are negative. Except as noted above the remainder of the review of systems was reviewed and negative. PAST MEDICAL HISTORY     Past Medical History:   Diagnosis Date    Postpartum depression     Spontaneous           SURGICAL HISTORY     No past surgical history on file. CURRENT MEDICATIONS       Previous Medications    CEPHALEXIN (KEFLEX) 500 MG CAPSULE    Take 1 capsule by mouth 2 times daily    VALACYCLOVIR (VALTREX) 500 MG TABLET    Take 500 mg by mouth 2 times daily       ALLERGIES     Patient has no known allergies.     FAMILY HISTORY       Family History   Problem Relation Age of Onset    Breast Cancer Neg Hx     Ovarian Cancer Neg Hx     Cervical Cancer Neg Hx     Uterine Cancer Neg Hx           SOCIAL HISTORY       Social History     Socioeconomic History    Marital status: Single   Tobacco Use    Smoking status:

## 2024-04-07 ENCOUNTER — HOSPITAL ENCOUNTER (EMERGENCY)
Age: 29
Discharge: HOME | End: 2024-04-07
Attending: EMERGENCY MEDICINE
Payer: COMMERCIAL

## 2024-04-07 ENCOUNTER — APPOINTMENT (OUTPATIENT)
Dept: CT IMAGING | Age: 29
End: 2024-04-07
Payer: COMMERCIAL

## 2024-04-07 ENCOUNTER — APPOINTMENT (OUTPATIENT)
Dept: GENERAL RADIOLOGY | Age: 29
End: 2024-04-07
Payer: COMMERCIAL

## 2024-04-07 VITALS
OXYGEN SATURATION: 99 % | RESPIRATION RATE: 21 BRPM | WEIGHT: 170 LBS | DIASTOLIC BLOOD PRESSURE: 81 MMHG | HEIGHT: 63 IN | HEART RATE: 93 BPM | TEMPERATURE: 97.7 F | BODY MASS INDEX: 30.12 KG/M2 | SYSTOLIC BLOOD PRESSURE: 118 MMHG

## 2024-04-07 DIAGNOSIS — J06.9 ACUTE UPPER RESPIRATORY INFECTION: Primary | ICD-10-CM

## 2024-04-07 LAB
ALBUMIN SERPL-MCNC: 4.1 G/DL (ref 3.5–4.6)
ALP SERPL-CCNC: 49 U/L (ref 40–130)
ALT SERPL-CCNC: 10 U/L (ref 0–33)
ANION GAP SERPL CALCULATED.3IONS-SCNC: 10 MEQ/L (ref 9–15)
AST SERPL-CCNC: 12 U/L (ref 0–35)
B PARAP IS1001 DNA NPH QL NAA+NON-PROBE: NOT DETECTED
B PERT.PT PRMT NPH QL NAA+NON-PROBE: NOT DETECTED
BASOPHILS # BLD: 0 K/UL (ref 0–0.2)
BASOPHILS NFR BLD: 0.2 %
BILIRUB SERPL-MCNC: 0.7 MG/DL (ref 0.2–0.7)
BUN SERPL-MCNC: 8 MG/DL (ref 6–20)
C PNEUM DNA NPH QL NAA+NON-PROBE: NOT DETECTED
CALCIUM SERPL-MCNC: 9 MG/DL (ref 8.5–9.9)
CHLORIDE SERPL-SCNC: 104 MEQ/L (ref 95–107)
CO2 SERPL-SCNC: 21 MEQ/L (ref 20–31)
CREAT SERPL-MCNC: 0.67 MG/DL (ref 0.5–0.9)
EOSINOPHIL # BLD: 0 K/UL (ref 0–0.7)
EOSINOPHIL NFR BLD: 0.2 %
ERYTHROCYTE [DISTWIDTH] IN BLOOD BY AUTOMATED COUNT: 13.4 % (ref 11.5–14.5)
FLUAV RNA NPH QL NAA+NON-PROBE: NOT DETECTED
FLUBV RNA NPH QL NAA+NON-PROBE: NOT DETECTED
GLOBULIN SER CALC-MCNC: 3 G/DL (ref 2.3–3.5)
GLUCOSE SERPL-MCNC: 93 MG/DL (ref 70–99)
HADV DNA NPH QL NAA+NON-PROBE: NOT DETECTED
HCOV 229E RNA NPH QL NAA+NON-PROBE: NOT DETECTED
HCOV HKU1 RNA NPH QL NAA+NON-PROBE: NOT DETECTED
HCOV NL63 RNA NPH QL NAA+NON-PROBE: NOT DETECTED
HCOV OC43 RNA NPH QL NAA+NON-PROBE: NOT DETECTED
HCT VFR BLD AUTO: 38.3 % (ref 37–47)
HGB BLD-MCNC: 13 G/DL (ref 12–16)
HMPV RNA NPH QL NAA+NON-PROBE: NOT DETECTED
HPIV1 RNA NPH QL NAA+NON-PROBE: NOT DETECTED
HPIV2 RNA NPH QL NAA+NON-PROBE: NOT DETECTED
HPIV3 RNA NPH QL NAA+NON-PROBE: NOT DETECTED
HPIV4 RNA NPH QL NAA+NON-PROBE: NOT DETECTED
LYMPHOCYTES # BLD: 0.7 K/UL (ref 1–4.8)
LYMPHOCYTES NFR BLD: 10.5 %
M PNEUMO DNA NPH QL NAA+NON-PROBE: NOT DETECTED
MCH RBC QN AUTO: 28.1 PG (ref 27–31.3)
MCHC RBC AUTO-ENTMCNC: 33.9 % (ref 33–37)
MCV RBC AUTO: 82.9 FL (ref 79.4–94.8)
MONOCYTES # BLD: 0.5 K/UL (ref 0.2–0.8)
MONOCYTES NFR BLD: 7.2 %
NEUTROPHILS # BLD: 5.3 K/UL (ref 1.4–6.5)
NEUTS SEG NFR BLD: 81.7 %
PLATELET # BLD AUTO: 166 K/UL (ref 130–400)
POTASSIUM SERPL-SCNC: 3.8 MEQ/L (ref 3.4–4.9)
PROT SERPL-MCNC: 7.1 G/DL (ref 6.3–8)
RBC # BLD AUTO: 4.62 M/UL (ref 4.2–5.4)
RSV RNA NPH QL NAA+NON-PROBE: NOT DETECTED
RV+EV RNA NPH QL NAA+NON-PROBE: NOT DETECTED
SARS-COV-2 RNA NPH QL NAA+NON-PROBE: NOT DETECTED
SODIUM SERPL-SCNC: 135 MEQ/L (ref 135–144)
WBC # BLD AUTO: 6.5 K/UL (ref 4.8–10.8)

## 2024-04-07 PROCEDURE — 71046 X-RAY EXAM CHEST 2 VIEWS: CPT

## 2024-04-07 PROCEDURE — 6360000004 HC RX CONTRAST MEDICATION: Performed by: EMERGENCY MEDICINE

## 2024-04-07 PROCEDURE — 99285 EMERGENCY DEPT VISIT HI MDM: CPT

## 2024-04-07 PROCEDURE — 0202U NFCT DS 22 TRGT SARS-COV-2: CPT

## 2024-04-07 PROCEDURE — 85025 COMPLETE CBC W/AUTO DIFF WBC: CPT

## 2024-04-07 PROCEDURE — 80053 COMPREHEN METABOLIC PANEL: CPT

## 2024-04-07 PROCEDURE — 36415 COLL VENOUS BLD VENIPUNCTURE: CPT

## 2024-04-07 PROCEDURE — 71275 CT ANGIOGRAPHY CHEST: CPT

## 2024-04-07 PROCEDURE — 93005 ELECTROCARDIOGRAM TRACING: CPT | Performed by: EMERGENCY MEDICINE

## 2024-04-07 RX ORDER — AZITHROMYCIN 250 MG/1
TABLET, FILM COATED ORAL
Qty: 1 PACKET | Refills: 0 | Status: SHIPPED | OUTPATIENT
Start: 2024-04-07 | End: 2024-04-17

## 2024-04-07 RX ORDER — ALBUTEROL SULFATE 90 UG/1
2 AEROSOL, METERED RESPIRATORY (INHALATION) 4 TIMES DAILY PRN
Qty: 18 G | Refills: 0 | Status: SHIPPED | OUTPATIENT
Start: 2024-04-07

## 2024-04-07 RX ADMIN — IOPAMIDOL 75 ML: 755 INJECTION, SOLUTION INTRAVENOUS at 12:24

## 2024-04-07 ASSESSMENT — PAIN DESCRIPTION - FREQUENCY: FREQUENCY: CONTINUOUS

## 2024-04-07 ASSESSMENT — PAIN DESCRIPTION - PAIN TYPE: TYPE: ACUTE PAIN

## 2024-04-07 ASSESSMENT — PAIN DESCRIPTION - ONSET: ONSET: ON-GOING

## 2024-04-07 ASSESSMENT — PAIN DESCRIPTION - LOCATION: LOCATION: CHEST

## 2024-04-07 ASSESSMENT — PAIN - FUNCTIONAL ASSESSMENT
PAIN_FUNCTIONAL_ASSESSMENT: PREVENTS OR INTERFERES SOME ACTIVE ACTIVITIES AND ADLS
PAIN_FUNCTIONAL_ASSESSMENT: 0-10

## 2024-04-07 ASSESSMENT — PAIN DESCRIPTION - ORIENTATION: ORIENTATION: MID

## 2024-04-07 ASSESSMENT — PAIN DESCRIPTION - DESCRIPTORS: DESCRIPTORS: HEAVINESS

## 2024-04-07 NOTE — ED TRIAGE NOTES
Pt began having SOB yesterday afternoon and then heaviness in chest last night  Pt states began having vomiting this morning about 0430  Pt states congestion and HA  Pt is alert and oriented times 4

## 2024-04-07 NOTE — ED PROVIDER NOTES
by the radiologist. Plain radiographic images are visualized and preliminarily interpreted by the emergency physician with the below findings:    Imaging studies as noted.  2 view chest x-ray demonstrates unremarkable mediastinum.  No evidence of pulmonary infiltrative process.  Normal inspection.  Unremarkable overall two-view chest x-ray.    Interpretation per the Radiologist below, if available at the time ofthis note:    CTA CHEST W WO CONTRAST PE Eval   Final Result      Normal chest CTA.  No pulmonary embolism.         XR CHEST (2 VW)   Final Result   No acute consolidation or infiltrate.               ED BEDSIDE ULTRASOUND:   Performed by ED Physician - none    LABS:  Labs Reviewed   CBC WITH AUTO DIFFERENTIAL - Abnormal; Notable for the following components:       Result Value    Lymphocytes Absolute 0.7 (*)     All other components within normal limits   RESPIRATORY PANEL, MOLECULAR, WITH COVID-19   COMPREHENSIVE METABOLIC PANEL   POC PREGNANCY UR-QUAL       All other labs were within normal range or not returned as of this dictation.    EMERGENCY DEPARTMENT COURSE and DIFFERENTIAL DIAGNOSIS/MDM:   Vitals:    Vitals:    04/07/24 0942 04/07/24 1034 04/07/24 1103   BP: 116/75 111/73 118/81   Pulse: (!) 110 92 93   Resp: 23 12 21   Temp: 97.7 °F (36.5 °C)     TempSrc: Tympanic     SpO2: 98% 98% 99%   Weight: 77.1 kg (170 lb)     Height: 1.6 m (5' 3\")         Patient made stable emergency clear without evidence of distress or decompensation.  Findings of runny nose congestion on report as well as complaint of feeling of chest pain with cough and feeling shortness of breath with positive other laboratory diagnostic findings, feel likely related to developing respiratory tract infection.  Advise close follow-up and reevaluation.  Patient expressed good understanding is very appreciative of care.  Medical Decision Making  Amount and/or Complexity of Data Reviewed  Labs: ordered.  Radiology: ordered.  ECG/medicine

## 2024-04-08 LAB
EKG ATRIAL RATE: 103 BPM
EKG P AXIS: 58 DEGREES
EKG P-R INTERVAL: 112 MS
EKG Q-T INTERVAL: 316 MS
EKG QRS DURATION: 72 MS
EKG QTC CALCULATION (BAZETT): 413 MS
EKG R AXIS: 8 DEGREES
EKG T AXIS: 6 DEGREES
EKG VENTRICULAR RATE: 103 BPM

## 2024-05-21 ENCOUNTER — OFFICE VISIT (OUTPATIENT)
Dept: OBGYN CLINIC | Age: 29
End: 2024-05-21
Payer: COMMERCIAL

## 2024-05-21 VITALS
HEIGHT: 63 IN | WEIGHT: 175 LBS | SYSTOLIC BLOOD PRESSURE: 100 MMHG | BODY MASS INDEX: 31.01 KG/M2 | HEART RATE: 80 BPM | DIASTOLIC BLOOD PRESSURE: 62 MMHG

## 2024-05-21 DIAGNOSIS — N91.2 AMENORRHEA: ICD-10-CM

## 2024-05-21 DIAGNOSIS — N91.2 AMENORRHEA: Primary | ICD-10-CM

## 2024-05-21 DIAGNOSIS — Z3A.10 10 WEEKS GESTATION OF PREGNANCY: ICD-10-CM

## 2024-05-21 DIAGNOSIS — Z36.87 UNSURE OF LMP (LAST MENSTRUAL PERIOD) AS REASON FOR ULTRASOUND SCAN: ICD-10-CM

## 2024-05-21 LAB
AMPHET UR QL SCN: NORMAL
BARBITURATES UR QL SCN: NORMAL
BENZODIAZ UR QL SCN: NORMAL
BILIRUB UR QL STRIP: NEGATIVE
BUPRENORPHINE+NOR UR QL SCN: NORMAL
CANNABINOIDS UR QL SCN: NORMAL
CLARITY UR: CLEAR
COCAINE UR QL SCN: NORMAL
COLOR UR: YELLOW
DRUG SCREEN COMMENT UR-IMP: NORMAL
FENTANYL SCREEN, URINE: NORMAL
GLUCOSE UR STRIP-MCNC: NEGATIVE MG/DL
HGB UR QL STRIP: NEGATIVE
KETONES UR STRIP-MCNC: ABNORMAL MG/DL
LEUKOCYTE ESTERASE UR QL STRIP: NEGATIVE
METHADONE UR QL SCN: NORMAL
NITRITE UR QL STRIP: NEGATIVE
OPIATES UR QL SCN: NORMAL
OXYCODONE UR QL SCN: NORMAL
PCP UR QL SCN: NORMAL
PH UR STRIP: 6.5 [PH] (ref 5–9)
PROPOXYPH UR QL SCN: NORMAL
PROT UR STRIP-MCNC: NEGATIVE MG/DL
SP GR UR STRIP: 1.02 (ref 1–1.03)
UROBILINOGEN UR STRIP-ACNC: 0.2 E.U./DL

## 2024-05-21 PROCEDURE — 76801 OB US < 14 WKS SINGLE FETUS: CPT | Performed by: OBSTETRICS & GYNECOLOGY

## 2024-05-21 PROCEDURE — 99214 OFFICE O/P EST MOD 30 MIN: CPT | Performed by: OBSTETRICS & GYNECOLOGY

## 2024-05-21 PROCEDURE — G8419 CALC BMI OUT NRM PARAM NOF/U: HCPCS | Performed by: OBSTETRICS & GYNECOLOGY

## 2024-05-21 PROCEDURE — G8427 DOCREV CUR MEDS BY ELIG CLIN: HCPCS | Performed by: OBSTETRICS & GYNECOLOGY

## 2024-05-21 PROCEDURE — 1036F TOBACCO NON-USER: CPT | Performed by: OBSTETRICS & GYNECOLOGY

## 2024-05-21 RX ORDER — PRENATAL WITH FERROUS FUM AND FOLIC ACID 3080; 920; 120; 400; 22; 1.84; 3; 20; 10; 1; 12; 200; 27; 25; 2 [IU]/1; [IU]/1; MG/1; [IU]/1; MG/1; MG/1; MG/1; MG/1; MG/1; MG/1; UG/1; MG/1; MG/1; MG/1; MG/1
TABLET ORAL
COMMUNITY
Start: 2024-04-12

## 2024-05-21 RX ORDER — METRONIDAZOLE 500 MG/1
500 TABLET ORAL 2 TIMES DAILY
Qty: 14 TABLET | Refills: 0 | Status: SHIPPED | OUTPATIENT
Start: 2024-05-21 | End: 2024-05-28

## 2024-05-21 RX ORDER — ONDANSETRON 4 MG/1
4 TABLET, FILM COATED ORAL EVERY 6 HOURS PRN
Qty: 30 TABLET | Refills: 1 | Status: SHIPPED | OUTPATIENT
Start: 2024-05-21

## 2024-05-21 SDOH — ECONOMIC STABILITY: FOOD INSECURITY: WITHIN THE PAST 12 MONTHS, YOU WORRIED THAT YOUR FOOD WOULD RUN OUT BEFORE YOU GOT MONEY TO BUY MORE.: NEVER TRUE

## 2024-05-21 SDOH — ECONOMIC STABILITY: INCOME INSECURITY: HOW HARD IS IT FOR YOU TO PAY FOR THE VERY BASICS LIKE FOOD, HOUSING, MEDICAL CARE, AND HEATING?: NOT HARD AT ALL

## 2024-05-21 SDOH — ECONOMIC STABILITY: HOUSING INSECURITY
IN THE LAST 12 MONTHS, WAS THERE A TIME WHEN YOU DID NOT HAVE A STEADY PLACE TO SLEEP OR SLEPT IN A SHELTER (INCLUDING NOW)?: NO

## 2024-05-21 SDOH — ECONOMIC STABILITY: FOOD INSECURITY: WITHIN THE PAST 12 MONTHS, THE FOOD YOU BOUGHT JUST DIDN'T LAST AND YOU DIDN'T HAVE MONEY TO GET MORE.: NEVER TRUE

## 2024-05-21 ASSESSMENT — PATIENT HEALTH QUESTIONNAIRE - PHQ9
SUM OF ALL RESPONSES TO PHQ QUESTIONS 1-9: 0
2. FEELING DOWN, DEPRESSED OR HOPELESS: NOT AT ALL
SUM OF ALL RESPONSES TO PHQ9 QUESTIONS 1 & 2: 0
SUM OF ALL RESPONSES TO PHQ QUESTIONS 1-9: 0
SUM OF ALL RESPONSES TO PHQ QUESTIONS 1-9: 0
1. LITTLE INTEREST OR PLEASURE IN DOING THINGS: NOT AT ALL
SUM OF ALL RESPONSES TO PHQ QUESTIONS 1-9: 0

## 2024-05-21 NOTE — PROGRESS NOTES
given.    Initial labs drawn.  Prenatal vitamins.  Problem list reviewed and updated.  Counseled about QUAD/ NIPT  Role of ultrasound in pregnancy discussed  Follow-up in 2 weeks  Early 1 hr OGTT - indicated ordered  Hep C screen indicated : no  FLU- not given   TDAP- to be given in third trimester     Patricia Morales M.D., F.A.C.O.G       First trimester transvaginal OB ultrasound done on 5/21/2024  Rodriguez gestation  Positive fetal heart tones at 160 bpm  Crown-rump length 34.6 mm consistent with 10 weeks and 3 days, final KLEBER is 12/14/2024 not consistent with LMP.   Adequate amniotic fluid  Normal placenta    Patricia Morales M.D., F.A.C.O.G

## 2024-05-23 LAB
BACTERIA UR CULT: ABNORMAL
BACTERIA UR CULT: ABNORMAL
ORGANISM: ABNORMAL

## 2024-06-01 LAB
Lab: NORMAL
NTRA FETAL FRACTION: NORMAL
NTRA GENDER OF FETUS: NORMAL
NTRA MONOSOMY X AGE-BASED RISK TEXT: NORMAL
NTRA MONOSOMY X RESULT TEXT: NORMAL
NTRA MONOSOMY X RISK SCORE TEXT: NORMAL
NTRA TRIPLOIDY RESULT TEXT: NORMAL
NTRA TRISOMY 13 AGE-BASED RISK TEXT: NORMAL
NTRA TRISOMY 13 RESULT TEXT: NORMAL
NTRA TRISOMY 13 RISK SCORE TEXT: NORMAL
NTRA TRISOMY 18 AGE-BASED RISK TEXT: NORMAL
NTRA TRISOMY 18 RESULT TEXT: NORMAL
NTRA TRISOMY 18 RISK SCORE TEXT: NORMAL
NTRA TRISOMY 21 AGE-BASED RISK TEXT: NORMAL
NTRA TRISOMY 21 RESULT TEXT: NORMAL
NTRA TRISOMY 21 RISK SCORE TEXT: NORMAL

## 2024-06-10 ENCOUNTER — INITIAL PRENATAL (OUTPATIENT)
Dept: OBGYN CLINIC | Age: 29
End: 2024-06-10

## 2024-06-10 VITALS
WEIGHT: 178 LBS | DIASTOLIC BLOOD PRESSURE: 62 MMHG | BODY MASS INDEX: 31.53 KG/M2 | SYSTOLIC BLOOD PRESSURE: 100 MMHG | HEART RATE: 80 BPM

## 2024-06-10 DIAGNOSIS — Z3A.13 13 WEEKS GESTATION OF PREGNANCY: Primary | ICD-10-CM

## 2024-06-10 NOTE — PROGRESS NOTES
OB visit      Corinne Kaiser is a 28 y.o. year old female  @ L 2/10/24, 13.2 wks by 10 wk US not consistent with LMP , final KLEBER  .     POB: FTSVD @ 39 wks , girl , 7lb7oz , 2016             SAB x 1     PGYN: denies     PMH: denies     PSH: denies     MEDS: PNV     Drug Allergies: NKDA    SOCHX: neg x 3     FH: Mother or Father , DM No , HTN No, Other No  /62   Pulse 80   Wt 80.7 kg (178 lb)   LMP 02/10/2024   BMI 31.53 kg/m²   Past Medical History:   Diagnosis Date    Herpes simplex virus (HSV) infection     Postpartum depression     Spontaneous       No past surgical history on file.      Review of Systems  Constitutional: negative  Genitourinary:see above  Integument/breast: negative  Behavioral/Psych: negative  Endocrine: negative     All other systems reviewed and are negative.       Physical Exam:  /62   Pulse 80   Wt 80.7 kg (178 lb)   LMP 02/10/2024   BMI 31.53 kg/m²   Skin: Warm, dry, no lesions or rashes  Extremities: Without clubbing, cyanosis and edema. Palms and nails are normal. Ambulates without difficulty  Neurological: No gross sensory or motor deficits.  Abdomen: Soft, non-tender without masses or organomegaly  bowel sounds normoactive    HOF : not palpable    FHT: 145 bpm     Assessment:   1. 13 weeks gestation of pregnancy          Plan:   No orders of the defined types were placed in this encounter.       No orders of the defined types were placed in this encounter.    Plan:   Patricia Morales MD    Corinne Kaiser  6/10/2024  Patient's last menstrual period was 02/10/2024.    INITIAL OBSTETRICAL VISIT EVALUATION:  The patient was seen full history and physical was completed/reviewed. Cytology was collected for patients over 21 years of age.Cultures were collected. The patient was counseled on office policies and she was counseled on termination of pregnancy in the state [facility].     The patient was counseled on Toxoplasmosis, HIV, Tobacco

## 2024-07-30 ENCOUNTER — ROUTINE PRENATAL (OUTPATIENT)
Dept: OBGYN CLINIC | Age: 29
End: 2024-07-30
Payer: COMMERCIAL

## 2024-07-30 VITALS — WEIGHT: 184 LBS | BODY MASS INDEX: 32.59 KG/M2

## 2024-07-30 DIAGNOSIS — Z3A.20 20 WEEKS GESTATION OF PREGNANCY: Primary | ICD-10-CM

## 2024-07-30 PROCEDURE — 1036F TOBACCO NON-USER: CPT | Performed by: OBSTETRICS & GYNECOLOGY

## 2024-07-30 PROCEDURE — G8419 CALC BMI OUT NRM PARAM NOF/U: HCPCS | Performed by: OBSTETRICS & GYNECOLOGY

## 2024-07-30 PROCEDURE — 99213 OFFICE O/P EST LOW 20 MIN: CPT | Performed by: OBSTETRICS & GYNECOLOGY

## 2024-07-30 PROCEDURE — G8427 DOCREV CUR MEDS BY ELIG CLIN: HCPCS | Performed by: OBSTETRICS & GYNECOLOGY

## 2024-07-30 NOTE — PROGRESS NOTES
OB visit      Corinne Kaiser is a 28 y.o. year old female  @ L 2/10/24, 20.3 wks by 10 wk US not consistent with LMP , final KLEBER  .     POB: FTSVD @ 39 wks , girl , 7lb7oz , 2016             FTSVD @ 39 wks , girl 2020            FTSVD @ 39 wks , girl              SAB x 1     PGYN: denies     PMH: denies     PSH: denies     MEDS: PNV     Drug Allergies: NKDA    SOCHX: neg x 3     FH: Mother or Father , DM No , HTN No, Other No  Wt 83.5 kg (184 lb)   LMP 02/10/2024   BMI 32.59 kg/m²   Past Medical History:   Diagnosis Date    Herpes simplex virus (HSV) infection     Postpartum depression     Spontaneous       No past surgical history on file.      Review of Systems  Constitutional: negative  Genitourinary:see above  Integument/breast: negative  Behavioral/Psych: negative  Endocrine: negative     All other systems reviewed and are negative.       Physical Exam:  Wt 83.5 kg (184 lb)   LMP 02/10/2024   BMI 32.59 kg/m²   Skin: Warm, dry, no lesions or rashes  Extremities: Without clubbing, cyanosis and edema. Palms and nails are normal. Ambulates without difficulty  Neurological: No gross sensory or motor deficits.  Abdomen: Soft, non-tender without masses or organomegaly  bowel sounds normoactive    HOF : umbilicus     FHT: 145 bpm     Assessment:   1. 20 weeks gestation of pregnancy          Plan:   Orders Placed This Encounter   Procedures    US OB 14 PLUS WEEKS SINGLE OR FIRST GESTATION     This procedure can be scheduled via MyChart.      Standing Status:   Future     Standing Expiration Date:   2025        No orders of the defined types were placed in this encounter.    Plan:   Patricia Morales MD    First trimester transvaginal OB ultrasound done on 2024  Rodriguez gestation  Positive fetal heart tones at 160 bpm  Crown-rump length 34.6 mm consistent with 10 weeks and 3 days, final KLEBER is 2024 not consistent with LMP.   Adequate amniotic fluid  Normal

## 2024-08-29 ENCOUNTER — HOSPITAL ENCOUNTER (OUTPATIENT)
Dept: ULTRASOUND IMAGING | Age: 29
Discharge: HOME | End: 2024-08-31
Attending: OBSTETRICS & GYNECOLOGY
Payer: COMMERCIAL

## 2024-08-29 DIAGNOSIS — Z3A.20 20 WEEKS GESTATION OF PREGNANCY: ICD-10-CM

## 2024-08-29 PROCEDURE — 76805 OB US >/= 14 WKS SNGL FETUS: CPT

## 2024-09-10 ENCOUNTER — ROUTINE PRENATAL (OUTPATIENT)
Dept: OBGYN CLINIC | Age: 29
End: 2024-09-10
Payer: COMMERCIAL

## 2024-09-10 VITALS
HEART RATE: 96 BPM | DIASTOLIC BLOOD PRESSURE: 62 MMHG | SYSTOLIC BLOOD PRESSURE: 116 MMHG | BODY MASS INDEX: 34.9 KG/M2 | WEIGHT: 197 LBS

## 2024-09-10 DIAGNOSIS — Z3A.26 26 WEEKS GESTATION OF PREGNANCY: Primary | ICD-10-CM

## 2024-09-10 DIAGNOSIS — O26.843 FUNDAL HEIGHT LOW FOR DATES IN THIRD TRIMESTER: ICD-10-CM

## 2024-09-10 PROCEDURE — 1036F TOBACCO NON-USER: CPT | Performed by: OBSTETRICS & GYNECOLOGY

## 2024-09-10 PROCEDURE — 99213 OFFICE O/P EST LOW 20 MIN: CPT | Performed by: OBSTETRICS & GYNECOLOGY

## 2024-09-10 PROCEDURE — G8419 CALC BMI OUT NRM PARAM NOF/U: HCPCS | Performed by: OBSTETRICS & GYNECOLOGY

## 2024-09-10 PROCEDURE — G8427 DOCREV CUR MEDS BY ELIG CLIN: HCPCS | Performed by: OBSTETRICS & GYNECOLOGY

## 2024-09-12 ENCOUNTER — HOSPITAL ENCOUNTER (OUTPATIENT)
Dept: ULTRASOUND IMAGING | Age: 29
Discharge: HOME | End: 2024-09-14
Attending: OBSTETRICS & GYNECOLOGY
Payer: COMMERCIAL

## 2024-09-12 DIAGNOSIS — Z3A.26 26 WEEKS GESTATION OF PREGNANCY: ICD-10-CM

## 2024-09-12 LAB
ABDOMINAL CIRCUMFERENCE: 20.78 CM
ABDOMINAL CIRCUMFERENCE: 21.72 CM
ABDOMINAL CIRCUMFERENCE: 21.8 CM
ABDOMINAL CIRCUMFERENCE: 21.95 CM
ABDOMINAL CIRCUMFERENCE: 22.34 CM
BIPARIETAL DIAMETER: 6.43 CM
BIPARIETAL DIAMETER: 6.49 CM
BIPARIETAL DIAMETER: 6.5 CM
BIPARIETAL DIAMETER: 6.54 CM
HEAD CIRCUMFERENCE: 24.37 CM
HEAD CIRCUMFERENCE: 24.41 CM
HEAD CIRCUMFERENCE: 24.43 CM
HEAD CIRCUMFERENCE: 24.51 CM

## 2024-09-12 PROCEDURE — 76817 TRANSVAGINAL US OBSTETRIC: CPT

## 2024-09-12 PROCEDURE — 76815 OB US LIMITED FETUS(S): CPT

## 2024-10-08 ENCOUNTER — ROUTINE PRENATAL (OUTPATIENT)
Dept: OBGYN CLINIC | Age: 29
End: 2024-10-08
Payer: COMMERCIAL

## 2024-10-08 VITALS
SYSTOLIC BLOOD PRESSURE: 108 MMHG | DIASTOLIC BLOOD PRESSURE: 62 MMHG | WEIGHT: 201 LBS | BODY MASS INDEX: 35.61 KG/M2 | HEART RATE: 84 BPM

## 2024-10-08 DIAGNOSIS — O26.843 FUNDAL HEIGHT LOW FOR DATES IN THIRD TRIMESTER: Primary | ICD-10-CM

## 2024-10-08 DIAGNOSIS — Z3A.30 30 WEEKS GESTATION OF PREGNANCY: ICD-10-CM

## 2024-10-08 PROCEDURE — G8427 DOCREV CUR MEDS BY ELIG CLIN: HCPCS | Performed by: OBSTETRICS & GYNECOLOGY

## 2024-10-08 PROCEDURE — G8419 CALC BMI OUT NRM PARAM NOF/U: HCPCS | Performed by: OBSTETRICS & GYNECOLOGY

## 2024-10-08 PROCEDURE — 99213 OFFICE O/P EST LOW 20 MIN: CPT | Performed by: OBSTETRICS & GYNECOLOGY

## 2024-10-08 PROCEDURE — 1036F TOBACCO NON-USER: CPT | Performed by: OBSTETRICS & GYNECOLOGY

## 2024-10-08 PROCEDURE — G8484 FLU IMMUNIZE NO ADMIN: HCPCS | Performed by: OBSTETRICS & GYNECOLOGY

## 2024-10-08 NOTE — PROGRESS NOTES
and 3 days, final KLEBER is 12/14/2024 not consistent with LMP.   Adequate amniotic fluid  Normal placenta    Patricia Morales M.D., F.A.C.O.G

## 2024-10-10 ENCOUNTER — HOSPITAL ENCOUNTER (OUTPATIENT)
Dept: ULTRASOUND IMAGING | Age: 29
Discharge: HOME | End: 2024-10-12
Payer: COMMERCIAL

## 2024-10-10 DIAGNOSIS — O26.843 FUNDAL HEIGHT LOW FOR DATES IN THIRD TRIMESTER: ICD-10-CM

## 2024-10-10 PROCEDURE — 76817 TRANSVAGINAL US OBSTETRIC: CPT

## 2024-10-10 PROCEDURE — 76815 OB US LIMITED FETUS(S): CPT

## 2024-10-25 ENCOUNTER — ROUTINE PRENATAL (OUTPATIENT)
Dept: OBGYN CLINIC | Age: 29
End: 2024-10-25

## 2024-10-25 ENCOUNTER — PATIENT MESSAGE (OUTPATIENT)
Dept: OBGYN CLINIC | Age: 29
End: 2024-10-25

## 2024-10-25 VITALS
WEIGHT: 207 LBS | SYSTOLIC BLOOD PRESSURE: 100 MMHG | DIASTOLIC BLOOD PRESSURE: 60 MMHG | BODY MASS INDEX: 36.67 KG/M2 | HEART RATE: 88 BPM

## 2024-10-25 DIAGNOSIS — Z3A.32 32 WEEKS GESTATION OF PREGNANCY: Primary | ICD-10-CM

## 2024-10-25 NOTE — PROGRESS NOTES
OB visit      Corinne Kaiser is a 28 y.o. year old female  @ L 2/10/24, 32.6 wks by 10 wk US not consistent with LMP , final KLEBER  .     POB: FTSVD @ 39 wks , girl , 7lb7oz , 2016             FTSVD @ 39 wks , girl             FTSVD @ 39 wks , girl              SAB x 1     PGYN: denies     PMH: denies     PSH: denies     MEDS: PNV     Drug Allergies: NKDA    SOCHX: neg x 3     FH: Mother or Father , DM No , HTN No, Other No  /60   Pulse 88   Wt 93.9 kg (207 lb)   LMP 02/10/2024   BMI 36.67 kg/m²   Past Medical History:   Diagnosis Date    Herpes simplex virus (HSV) infection     Postpartum depression     Spontaneous       No past surgical history on file.      Review of Systems  Constitutional: negative  Genitourinary:see above  Integument/breast: negative  Behavioral/Psych: negative  Endocrine: negative     All other systems reviewed and are negative.       Physical Exam:  /60   Pulse 88   Wt 93.9 kg (207 lb)   LMP 02/10/2024   BMI 36.67 kg/m²   Skin: Warm, dry, no lesions or rashes  Extremities: Without clubbing, cyanosis and edema. Palms and nails are normal. Ambulates without difficulty  Neurological: No gross sensory or motor deficits.  Abdomen: Soft, non-tender without masses or organomegaly  bowel sounds normoactive    HOF : 33 cm     FHT: 145 bpm     Assessment:   1. 32 weeks gestation of pregnancy          Plan:   No orders of the defined types were placed in this encounter.       No orders of the defined types were placed in this encounter.    Plan:   Patricia Morales MD    RTC in 2 weeks   First trimester transvaginal OB ultrasound done on 2024  Rodriguez gestation  Positive fetal heart tones at 160 bpm  Crown-rump length 34.6 mm consistent with 10 weeks and 3 days, final KLEBER is 2024 not consistent with LMP.   Adequate amniotic fluid  Normal placenta    Patricia Morales M.D., F.A.C.O.G

## 2024-10-31 ENCOUNTER — TELEPHONE (OUTPATIENT)
Dept: OBGYN CLINIC | Age: 29
End: 2024-10-31

## 2024-10-31 RX ORDER — VALACYCLOVIR HYDROCHLORIDE 500 MG/1
500 TABLET, FILM COATED ORAL 2 TIMES DAILY
Qty: 60 TABLET | Refills: 5 | Status: SHIPPED | OUTPATIENT
Start: 2024-10-31

## 2024-10-31 NOTE — TELEPHONE ENCOUNTER
Letter done and sent to patient through [employer]. Left message informing patient of this and to call the office with any questions.

## 2024-10-31 NOTE — TELEPHONE ENCOUNTER
Pt called she is requesting a note off work for today 10/31/24 and tomorrow 11/1/24.     She states she spoke to Dr. Morales at last visit about starting maternity to leave as well. She would like to start this today 10/31/24. She  states she does not have FMLA at work , all that is required is a note for her to start maternity leave.     Pt would like a call when the note is completed and for it to be sent to her in  [employer]. She does not get [employer] notifications , so she would like the call when it is sent.

## 2024-11-05 ENCOUNTER — ROUTINE PRENATAL (OUTPATIENT)
Dept: OBGYN CLINIC | Age: 29
End: 2024-11-05
Payer: COMMERCIAL

## 2024-11-05 VITALS
DIASTOLIC BLOOD PRESSURE: 58 MMHG | WEIGHT: 207 LBS | SYSTOLIC BLOOD PRESSURE: 108 MMHG | HEART RATE: 80 BPM | BODY MASS INDEX: 36.67 KG/M2

## 2024-11-05 DIAGNOSIS — Z3A.34 34 WEEKS GESTATION OF PREGNANCY: Primary | ICD-10-CM

## 2024-11-05 PROCEDURE — G8484 FLU IMMUNIZE NO ADMIN: HCPCS | Performed by: OBSTETRICS & GYNECOLOGY

## 2024-11-05 PROCEDURE — 1036F TOBACCO NON-USER: CPT | Performed by: OBSTETRICS & GYNECOLOGY

## 2024-11-05 PROCEDURE — 99213 OFFICE O/P EST LOW 20 MIN: CPT | Performed by: OBSTETRICS & GYNECOLOGY

## 2024-11-05 PROCEDURE — G8427 DOCREV CUR MEDS BY ELIG CLIN: HCPCS | Performed by: OBSTETRICS & GYNECOLOGY

## 2024-11-05 PROCEDURE — G8419 CALC BMI OUT NRM PARAM NOF/U: HCPCS | Performed by: OBSTETRICS & GYNECOLOGY

## 2024-11-05 NOTE — PROGRESS NOTES
OB visit      Corinne Kaiser is a 28 y.o. year old female  @ L 2/10/24, 34.3 wks by 10 wk US not consistent with LMP , final KLEBER  .     POB: FTSVD @ 39 wks , girl , 7lb7oz , 2016             FTSVD @ 39 wks , girl             FTSVD @ 39 wks , girl              SAB x 1     PGYN: denies     PMH: denies     PSH: denies     MEDS: PNV     Drug Allergies: NKDA    SOCHX: neg x 3     FH: Mother or Father , DM No , HTN No, Other No  BP (!) 108/58   Pulse 80   Wt 93.9 kg (207 lb)   LMP 02/10/2024   BMI 36.67 kg/m²   Past Medical History:   Diagnosis Date    Herpes simplex virus (HSV) infection     Postpartum depression     Spontaneous       No past surgical history on file.      Review of Systems  Constitutional: negative  Genitourinary:see above  Integument/breast: negative  Behavioral/Psych: negative  Endocrine: negative     All other systems reviewed and are negative.       Physical Exam:  BP (!) 108/58   Pulse 80   Wt 93.9 kg (207 lb)   LMP 02/10/2024   BMI 36.67 kg/m²   Skin: Warm, dry, no lesions or rashes  Extremities: Without clubbing, cyanosis and edema. Palms and nails are normal. Ambulates without difficulty  Neurological: No gross sensory or motor deficits.  Abdomen: Soft, non-tender without masses or organomegaly  bowel sounds normoactive    HOF : 34 cm     FHT: 145 bpm     Assessment:   1. 34 weeks gestation of pregnancy            Plan:   No orders of the defined types were placed in this encounter.       No orders of the defined types were placed in this encounter.    Plan:   Patricia Morales MD    RTC in 2 weeks   First trimester transvaginal OB ultrasound done on 2024  Rodriguez gestation  Positive fetal heart tones at 160 bpm  Crown-rump length 34.6 mm consistent with 10 weeks and 3 days, final KLEBER is 2024 not consistent with LMP.   Adequate amniotic fluid  Normal placenta    Patricia Morales M.D., F.A.C.O.G

## 2024-11-06 ENCOUNTER — TELEPHONE (OUTPATIENT)
Dept: OBGYN CLINIC | Age: 29
End: 2024-11-06

## 2024-11-06 ENCOUNTER — HOSPITAL ENCOUNTER (OUTPATIENT)
Age: 29
Discharge: HOME | End: 2024-11-06
Attending: OBSTETRICS & GYNECOLOGY | Admitting: OBSTETRICS & GYNECOLOGY
Payer: COMMERCIAL

## 2024-11-06 VITALS
SYSTOLIC BLOOD PRESSURE: 111 MMHG | RESPIRATION RATE: 18 BRPM | TEMPERATURE: 97.9 F | HEART RATE: 112 BPM | DIASTOLIC BLOOD PRESSURE: 57 MMHG | OXYGEN SATURATION: 98 %

## 2024-11-06 PROBLEM — R82.81 PYURIA: Status: ACTIVE | Noted: 2024-11-06

## 2024-11-06 PROBLEM — Z03.71 NO LEAKAGE OF AMNIOTIC FLUID INTO VAGINA: Status: ACTIVE | Noted: 2024-11-06

## 2024-11-06 PROBLEM — O26.893 VAGINAL DISCHARGE IN PREGNANCY IN THIRD TRIMESTER: Status: ACTIVE | Noted: 2024-11-06

## 2024-11-06 PROBLEM — Z3A.34 34 WEEKS GESTATION OF PREGNANCY: Status: ACTIVE | Noted: 2024-11-06

## 2024-11-06 PROBLEM — N89.8 VAGINAL DISCHARGE IN PREGNANCY IN THIRD TRIMESTER: Status: ACTIVE | Noted: 2024-11-06

## 2024-11-06 LAB
AMPHET UR QL SCN: NORMAL
BACTERIA URNS QL MICRO: ABNORMAL /HPF
BARBITURATES UR QL SCN: NORMAL
BENZODIAZ UR QL SCN: NORMAL
BILIRUB UR QL STRIP: NEGATIVE
CANNABINOIDS UR QL SCN: NORMAL
CLARITY UR: ABNORMAL
CLUE CELLS VAG QL WET PREP: NORMAL
COCAINE UR QL SCN: NORMAL
COLOR UR: YELLOW
DRUG SCREEN COMMENT UR-IMP: NORMAL
EPI CELLS #/AREA URNS AUTO: ABNORMAL /HPF (ref 0–5)
FENTANYL SCREEN, URINE: NORMAL
GLUCOSE UR STRIP-MCNC: NEGATIVE MG/DL
HGB UR QL STRIP: NEGATIVE
HYALINE CASTS #/AREA URNS AUTO: ABNORMAL /HPF (ref 0–5)
KETONES UR STRIP-MCNC: NEGATIVE MG/DL
LEUKOCYTE ESTERASE UR QL STRIP: ABNORMAL
METHADONE UR QL SCN: NORMAL
NITRITE UR QL STRIP: NEGATIVE
OPIATES UR QL SCN: NORMAL
OXYCODONE UR QL SCN: NORMAL
PCP UR QL SCN: NORMAL
PH UR STRIP: 7.5 [PH] (ref 5–9)
PLACENTA ALPHA MICROGLOBULIN-1: NEGATIVE
PROPOXYPH UR QL SCN: NORMAL
PROT UR STRIP-MCNC: NEGATIVE MG/DL
RBC #/AREA URNS AUTO: ABNORMAL /HPF (ref 0–5)
SP GR UR STRIP: 1.01 (ref 1–1.03)
T VAGINALIS VAG QL WET PREP: NORMAL
TRICHOMONAS VAGINALIS SCREEN: NEGATIVE
URINE REFLEX TO CULTURE: YES
UROBILINOGEN UR STRIP-ACNC: 0.2 E.U./DL
WBC #/AREA URNS AUTO: ABNORMAL /HPF (ref 0–5)
YEAST VAG QL WET PREP: NORMAL

## 2024-11-06 PROCEDURE — 99214 OFFICE O/P EST MOD 30 MIN: CPT | Performed by: OBSTETRICS & GYNECOLOGY

## 2024-11-06 PROCEDURE — 84112 EVAL AMNIOTIC FLUID PROTEIN: CPT

## 2024-11-06 PROCEDURE — 87086 URINE CULTURE/COLONY COUNT: CPT

## 2024-11-06 PROCEDURE — 87081 CULTURE SCREEN ONLY: CPT

## 2024-11-06 PROCEDURE — 87808 TRICHOMONAS ASSAY W/OPTIC: CPT

## 2024-11-06 PROCEDURE — 99285 EMERGENCY DEPT VISIT HI MDM: CPT

## 2024-11-06 PROCEDURE — 81001 URINALYSIS AUTO W/SCOPE: CPT

## 2024-11-06 PROCEDURE — 80307 DRUG TEST PRSMV CHEM ANLYZR: CPT

## 2024-11-06 PROCEDURE — 59025 FETAL NON-STRESS TEST: CPT | Performed by: OBSTETRICS & GYNECOLOGY

## 2024-11-06 PROCEDURE — 87210 SMEAR WET MOUNT SALINE/INK: CPT

## 2024-11-06 NOTE — TELEPHONE ENCOUNTER
Patient states that starting at 8 am this morning she started feeling fluid leaking and noticed a yellowish discharge along with mild contractions, she is currently 34w4d and was instructed to go to L&D to be evaluated by ANJELICA Koo

## 2024-11-06 NOTE — ED TRIAGE NOTES
Department of Obstetrics and Gynecology  Labor and Delivery  Frahan Prado MD: OB Triage Note      SUBJECTIVE:  Patient is a 28 yo  female @ 34.4 weeks with EDC 2024 s/b 10.3 week sono.  She presents with complaints of contractions and possible fluid leaking since 8 am this morning. She also noticed some yellowish discharge, but denies any recent coitus. She reports +FM but denies any vaginal bleeding. She presents for reassurance.     OBJECTIVE    ROS:  Gen: Negative  CV: Negative  Lungs: Negative  Abdomen: +FM, contractions  Pelvis: VD, leaking of fluid?  Rest of systems reviewed and found to be negative.    Recent Vitals     Most Recent Value 2024  1111 2024  1108 2024  1012   BP: 111/57 Abnormal    as of 2024 111/57 Abnormal   120/61  108/58 Abnormal      left right    Temp: 97.9 °F (36.6 °C)  as of 2024 -- 97.9 °F (36.6 °C) --   Pulse: 112 Abnormal   as of 2024 112 Abnormal  124 Abnormal  80   Height: 160 cm (5' 3\")  as of 2024 -- -- --   Weight - Scale: 93.9 kg (207 lb)  as of 2024 -- -- 93.9 kg (207 lb)   Body Mass Index: 36.67 kg/m² Abnormal   160 cm (5' 3\")  as of 2024  93.9 kg (207 lb)  as of 2024      Respirations: 18  as of 2024 -- 18 --   SpO2: 98%  as of 2024 -- 98 %      PE:   Gen: A x O x 3, in NAD  Abdomen: soft, gravid, +FM  Cervix:             Dilation:  0         Effacement:  0%         Station:  -3         Consistency:  Medium         Position: posterior    Membranes: Intact    Fetal heart rate:  Category I (REACTIVE)-duration 30 minutes        Baseline Heart Rate: 140s R        Accelerations:  present       Decelerations:  none       Variability:  moderate    Contraction frequency: irritability    DATA:  Results     Component Value Units   Trichomonas by EIA [[id number]]    Collected: 24 1122    Updated: 24 1145     TRICHOMONAS VAGINALIS SCREEN Negative   Wet Prep, Genital [[id number]]    Collected: 24 
- - -

## 2024-11-07 LAB — BACTERIA UR CULT: NORMAL

## 2024-11-09 LAB — GP B STREP SPEC QL CULT: NORMAL

## 2024-11-18 ENCOUNTER — HOSPITAL ENCOUNTER (OUTPATIENT)
Age: 29
Discharge: HOME | End: 2024-11-18
Attending: OBSTETRICS & GYNECOLOGY | Admitting: OBSTETRICS & GYNECOLOGY
Payer: COMMERCIAL

## 2024-11-18 VITALS
RESPIRATION RATE: 16 BRPM | TEMPERATURE: 97.9 F | HEART RATE: 88 BPM | OXYGEN SATURATION: 98 % | SYSTOLIC BLOOD PRESSURE: 110 MMHG | DIASTOLIC BLOOD PRESSURE: 57 MMHG

## 2024-11-18 PROBLEM — O26.899 ABDOMINAL CRAMPING AFFECTING PREGNANCY: Status: ACTIVE | Noted: 2024-11-18

## 2024-11-18 PROBLEM — Z3A.36 PREGNANCY WITH 36 COMPLETED WEEKS GESTATION: Status: ACTIVE | Noted: 2024-11-18

## 2024-11-18 PROBLEM — R10.9 ABDOMINAL CRAMPING AFFECTING PREGNANCY: Status: ACTIVE | Noted: 2024-11-18

## 2024-11-18 LAB
AMPHET UR QL SCN: NORMAL
BACTERIA URNS QL MICRO: ABNORMAL /HPF
BARBITURATES UR QL SCN: NORMAL
BENZODIAZ UR QL SCN: NORMAL
BILIRUB UR QL STRIP: NEGATIVE
CANNABINOIDS UR QL SCN: NORMAL
CLARITY UR: ABNORMAL
COCAINE UR QL SCN: NORMAL
COLOR UR: YELLOW
DRUG SCREEN COMMENT UR-IMP: NORMAL
EPI CELLS #/AREA URNS AUTO: ABNORMAL /HPF (ref 0–5)
FENTANYL SCREEN, URINE: NORMAL
GLUCOSE UR STRIP-MCNC: NEGATIVE MG/DL
HGB UR QL STRIP: NEGATIVE
HYALINE CASTS #/AREA URNS AUTO: ABNORMAL /HPF (ref 0–5)
HYALINE CASTS #/AREA URNS LPF: ABNORMAL /LPF (ref 0–5)
KETONES UR STRIP-MCNC: NEGATIVE MG/DL
LEUKOCYTE ESTERASE UR QL STRIP: ABNORMAL
METHADONE UR QL SCN: NORMAL
NITRITE UR QL STRIP: NEGATIVE
OPIATES UR QL SCN: NORMAL
OXYCODONE UR QL SCN: NORMAL
PCP UR QL SCN: NORMAL
PH UR STRIP: 7 [PH] (ref 5–9)
PLACENTA ALPHA MICROGLOBULIN-1: NEGATIVE
PROPOXYPH UR QL SCN: NORMAL
PROT UR STRIP-MCNC: 30 MG/DL
RBC #/AREA URNS AUTO: ABNORMAL /HPF (ref 0–5)
SP GR UR STRIP: 1.02 (ref 1–1.03)
URINE REFLEX TO CULTURE: YES
UROBILINOGEN UR STRIP-ACNC: 1 E.U./DL
WBC #/AREA URNS AUTO: >100 /HPF (ref 0–5)

## 2024-11-18 PROCEDURE — 99283 EMERGENCY DEPT VISIT LOW MDM: CPT

## 2024-11-18 PROCEDURE — 87086 URINE CULTURE/COLONY COUNT: CPT

## 2024-11-18 PROCEDURE — 81001 URINALYSIS AUTO W/SCOPE: CPT

## 2024-11-18 PROCEDURE — 59025 FETAL NON-STRESS TEST: CPT | Performed by: OBSTETRICS & GYNECOLOGY

## 2024-11-18 PROCEDURE — 87077 CULTURE AEROBIC IDENTIFY: CPT

## 2024-11-18 PROCEDURE — 84112 EVAL AMNIOTIC FLUID PROTEIN: CPT

## 2024-11-18 PROCEDURE — 99213 OFFICE O/P EST LOW 20 MIN: CPT | Performed by: OBSTETRICS & GYNECOLOGY

## 2024-11-18 PROCEDURE — 80307 DRUG TEST PRSMV CHEM ANLYZR: CPT

## 2024-11-18 PROCEDURE — 59025 FETAL NON-STRESS TEST: CPT

## 2024-11-18 NOTE — FLOWSHEET NOTE
Pt presents to triage with complaints of possible SROM at 0500 of clear fluid. Reports cramping. Denies VB, intercourse.  Electronically signed by ALVA ESTRADA RN on 11/18/2024 at 9:57 AM

## 2024-11-18 NOTE — ED TRIAGE NOTES
Department of Obstetrics and Gynecology  Labor and Delivery  Farhan Prado MD: OB Triage Note      SUBJECTIVE:   Patient is a 30 yo  female @ 36.2 weeks with EDC 2024 s/b 10.3 week sono.  She presents with complaints of contractions and possible fluid leaking since 8 am this morning. Shedenies any recent coitus. She reports +FM but denies any vaginal bleeding. She presents for reassurance.     OBJECTIVE    ROS:  Gen: Negative  CV: Negative   Lungs: Negative  Abdomen: +FM, cramping  Pelvis: pressure   Rest of systems reviewed and found to be negative.    Recent Vitals     Most Recent Value 2024  0944 2024  1111 2024  1108   BP: 106/50 Abnormal   as of 2024 106/50 Abnormal  111/57 Abnormal   120/61       left right   Temp: 97.9 °F (36.6 °C)  as of 2024 97.9 °F (36.6 °C) -- 97.9 °F (36.6 °C)   Pulse: 81  as of 2024 81 112 Abnormal  124 Abnormal    Height: 160 cm (5' 3\")  as of 2024 -- -- --   Weight - Scale: 93.9 kg (207 lb)  as of 2024 -- -- --   Body Mass Index: 36.67 kg/m² Abnormal   160 cm (5' 3\")  as of 2024  93.9 kg (207 lb)  as of 2024      Respirations: 16  as of 2024 16 -- 18   SpO2: 98%  as of 2024 98 % -- 98 %     PE:   Gen: A x O x 3, in NAD  Abdomen: soft, +GM,   Cervix:             Dilation:  0 cm inner os, 2 cm outer os          Effacement:  60%         Station:  -2         Consistency:  Medium         Position: Posterior    Membranes: Intact     Fetal heart rate:  Category I (REACTIVE)-duration 20 minutes        Baseline Heart Rate: 140s R        Accelerations:  present       Decelerations:  none       Variability:  moderate    Contraction frequency: none    Results     Component Value Units   Urinalysis with Reflex to Culture [[id number]] (Abnormal)    Collected: 2456    Updated: 24 1123    Specimen Source: Urine     Color, UA Yellow    Clarity, UA CLOUDY Abnormal     Glucose, Ur Negative mg/dL

## 2024-11-19 ENCOUNTER — ROUTINE PRENATAL (OUTPATIENT)
Dept: OBGYN CLINIC | Age: 29
End: 2024-11-19
Payer: COMMERCIAL

## 2024-11-19 VITALS
WEIGHT: 207 LBS | DIASTOLIC BLOOD PRESSURE: 66 MMHG | SYSTOLIC BLOOD PRESSURE: 118 MMHG | BODY MASS INDEX: 36.67 KG/M2 | HEART RATE: 101 BPM

## 2024-11-19 DIAGNOSIS — Z3A.36 36 WEEKS GESTATION OF PREGNANCY: ICD-10-CM

## 2024-11-19 DIAGNOSIS — Z34.83 ENCOUNTER FOR SUPERVISION OF OTHER NORMAL PREGNANCY IN THIRD TRIMESTER: Primary | ICD-10-CM

## 2024-11-19 DIAGNOSIS — Z34.83 ENCOUNTER FOR SUPERVISION OF OTHER NORMAL PREGNANCY IN THIRD TRIMESTER: ICD-10-CM

## 2024-11-19 LAB — BACTERIA UR CULT: NORMAL

## 2024-11-19 PROCEDURE — G8427 DOCREV CUR MEDS BY ELIG CLIN: HCPCS | Performed by: OBSTETRICS & GYNECOLOGY

## 2024-11-19 PROCEDURE — 99213 OFFICE O/P EST LOW 20 MIN: CPT | Performed by: OBSTETRICS & GYNECOLOGY

## 2024-11-19 PROCEDURE — 1036F TOBACCO NON-USER: CPT | Performed by: OBSTETRICS & GYNECOLOGY

## 2024-11-19 PROCEDURE — G8419 CALC BMI OUT NRM PARAM NOF/U: HCPCS | Performed by: OBSTETRICS & GYNECOLOGY

## 2024-11-19 PROCEDURE — G8484 FLU IMMUNIZE NO ADMIN: HCPCS | Performed by: OBSTETRICS & GYNECOLOGY

## 2024-11-19 NOTE — PROGRESS NOTES
OB visit      Corinne Kaiser is a 28 y.o. year old female  @ L 2/10/24, 36.3 wks by 10 wk US not consistent with LMP , final KLEBER  .     POB: FTSVD @ 39 wks , girl , 7lb7oz , 2016             FTSVD @ 39 wks , girl             FTSVD @ 39 wks , girl              SAB x 1     PGYN: denies     PMH: denies     PSH: denies     MEDS: PNV     Drug Allergies: NKDA    SOCHX: neg x 3     FH: Mother or Father , DM No , HTN No, Other No  /66   Pulse (!) 101   Wt 93.9 kg (207 lb)   LMP 02/10/2024   BMI 36.67 kg/m²   Past Medical History:   Diagnosis Date    Abdominal cramping affecting pregnancy 2024    Herpes simplex virus (HSV) infection     Postpartum depression     Spontaneous       No past surgical history on file.      Review of Systems  Constitutional: negative  Genitourinary:see above  Integument/breast: negative  Behavioral/Psych: negative  Endocrine: negative     All other systems reviewed and are negative.       Physical Exam:  /66   Pulse (!) 101   Wt 93.9 kg (207 lb)   LMP 02/10/2024   BMI 36.67 kg/m²   Skin: Warm, dry, no lesions or rashes  Extremities: Without clubbing, cyanosis and edema. Palms and nails are normal. Ambulates without difficulty  Neurological: No gross sensory or motor deficits.  Abdomen: Soft, non-tender without masses or organomegaly  bowel sounds normoactive    HOF : 36 cm     FHT: 145 bpm     Assessment:   1. Encounter for supervision of other normal pregnancy in third trimester    2. 36 weeks gestation of pregnancy          Plan:   Orders Placed This Encounter   Procedures    Culture, Strep B Screen, Vaginal/Rectal     Standing Status:   Future     Standing Expiration Date:   2025        No orders of the defined types were placed in this encounter.    Plan:   Patricia Morales MD    RTC in 2 weeks   First trimester transvaginal OB ultrasound done on 2024  Rodriguez gestation  Positive fetal heart tones at 160

## 2024-11-22 LAB — GP B STREP SPEC QL CULT: NORMAL

## 2024-11-25 ENCOUNTER — ROUTINE PRENATAL (OUTPATIENT)
Dept: OBGYN CLINIC | Age: 29
End: 2024-11-25
Payer: COMMERCIAL

## 2024-11-25 VITALS
HEART RATE: 90 BPM | WEIGHT: 211 LBS | SYSTOLIC BLOOD PRESSURE: 104 MMHG | DIASTOLIC BLOOD PRESSURE: 60 MMHG | BODY MASS INDEX: 37.38 KG/M2

## 2024-11-25 DIAGNOSIS — Z34.83 ENCOUNTER FOR SUPERVISION OF OTHER NORMAL PREGNANCY IN THIRD TRIMESTER: Primary | ICD-10-CM

## 2024-11-25 DIAGNOSIS — Z3A.37 37 WEEKS GESTATION OF PREGNANCY: ICD-10-CM

## 2024-11-25 PROCEDURE — 1036F TOBACCO NON-USER: CPT | Performed by: OBSTETRICS & GYNECOLOGY

## 2024-11-25 PROCEDURE — G8417 CALC BMI ABV UP PARAM F/U: HCPCS | Performed by: OBSTETRICS & GYNECOLOGY

## 2024-11-25 PROCEDURE — G8484 FLU IMMUNIZE NO ADMIN: HCPCS | Performed by: OBSTETRICS & GYNECOLOGY

## 2024-11-25 PROCEDURE — 99213 OFFICE O/P EST LOW 20 MIN: CPT | Performed by: OBSTETRICS & GYNECOLOGY

## 2024-11-25 PROCEDURE — G8427 DOCREV CUR MEDS BY ELIG CLIN: HCPCS | Performed by: OBSTETRICS & GYNECOLOGY

## 2024-11-25 NOTE — PROGRESS NOTES
OB visit      Corinne Kaiser is a 28 y.o. year old female  @ L 2/10/24, 37.2 wks by 10 wk US not consistent with LMP , final KLEBER  .     POB: FTSVD @ 39 wks , girl , 7lb7oz , 2016             FTSVD @ 39 wks , girl             FTSVD @ 39 wks , girl              SAB x 1     PGYN: denies     PMH: denies     PSH: denies     MEDS: PNV     Drug Allergies: NKDA    SOCHX: neg x 3     FH: Mother or Father , DM No , HTN No, Other No  /60   Pulse 90   Wt 95.7 kg (211 lb)   LMP 02/10/2024   BMI 37.38 kg/m²   Past Medical History:   Diagnosis Date    Abdominal cramping affecting pregnancy 2024    Herpes simplex virus (HSV) infection     Postpartum depression     Spontaneous       No past surgical history on file.      Review of Systems  Constitutional: negative  Genitourinary:see above  Integument/breast: negative  Behavioral/Psych: negative  Endocrine: negative     All other systems reviewed and are negative.       Physical Exam:  /60   Pulse 90   Wt 95.7 kg (211 lb)   LMP 02/10/2024   BMI 37.38 kg/m²   Skin: Warm, dry, no lesions or rashes  Extremities: Without clubbing, cyanosis and edema. Palms and nails are normal. Ambulates without difficulty  Neurological: No gross sensory or motor deficits.  Abdomen: Soft, non-tender without masses or organomegaly  bowel sounds normoactive    HOF : 37 cm     FHT: 145 bpm     Assessment:   1. Encounter for supervision of other normal pregnancy in third trimester    2. 37 weeks gestation of pregnancy          Plan:   No orders of the defined types were placed in this encounter.       No orders of the defined types were placed in this encounter.    Plan:   Patricia Morales MD    RTC in 1 weeks   First trimester transvaginal OB ultrasound done on 2024  Rodriguez gestation  Positive fetal heart tones at 160 bpm  Crown-rump length 34.6 mm consistent with 10 weeks and 3 days, final KLEBER is 2024 not consistent with LMP.

## 2024-11-26 ENCOUNTER — HOSPITAL ENCOUNTER (OUTPATIENT)
Age: 29
Discharge: HOME | End: 2024-11-26
Attending: OBSTETRICS & GYNECOLOGY | Admitting: OBSTETRICS & GYNECOLOGY
Payer: COMMERCIAL

## 2024-11-26 VITALS
HEIGHT: 63 IN | TEMPERATURE: 98.3 F | OXYGEN SATURATION: 97 % | HEART RATE: 78 BPM | SYSTOLIC BLOOD PRESSURE: 107 MMHG | DIASTOLIC BLOOD PRESSURE: 54 MMHG | RESPIRATION RATE: 16 BRPM | BODY MASS INDEX: 37.16 KG/M2 | WEIGHT: 209.7 LBS

## 2024-11-26 PROBLEM — N93.9 VAGINAL SPOTTING: Status: ACTIVE | Noted: 2024-11-26

## 2024-11-26 PROBLEM — Z3A.37 PREGNANCY WITH 37 WEEKS COMPLETED GESTATION: Status: ACTIVE | Noted: 2024-11-26

## 2024-11-26 PROBLEM — O23.43 URINARY TRACT INFECTION IN MOTHER DURING THIRD TRIMESTER OF PREGNANCY: Status: ACTIVE | Noted: 2024-11-26

## 2024-11-26 LAB
AMPHET UR QL SCN: NORMAL
BACTERIA URNS QL MICRO: ABNORMAL /HPF
BARBITURATES UR QL SCN: NORMAL
BENZODIAZ UR QL SCN: NORMAL
BILIRUB UR QL STRIP: NEGATIVE
CANNABINOIDS UR QL SCN: NORMAL
CLARITY UR: CLEAR
COCAINE UR QL SCN: NORMAL
COLOR UR: YELLOW
DRUG SCREEN COMMENT UR-IMP: NORMAL
EPI CELLS #/AREA URNS AUTO: ABNORMAL /HPF (ref 0–5)
FENTANYL SCREEN, URINE: NORMAL
GLUCOSE UR STRIP-MCNC: NEGATIVE MG/DL
HGB UR QL STRIP: ABNORMAL
HYALINE CASTS #/AREA URNS AUTO: ABNORMAL /HPF (ref 0–5)
KETONES UR STRIP-MCNC: NEGATIVE MG/DL
LEUKOCYTE ESTERASE UR QL STRIP: ABNORMAL
METHADONE UR QL SCN: NORMAL
NITRITE UR QL STRIP: NEGATIVE
OPIATES UR QL SCN: NORMAL
OXYCODONE UR QL SCN: NORMAL
PCP UR QL SCN: NORMAL
PH UR STRIP: 6.5 [PH] (ref 5–9)
PROPOXYPH UR QL SCN: NORMAL
PROT UR STRIP-MCNC: NEGATIVE MG/DL
RBC #/AREA URNS AUTO: ABNORMAL /HPF (ref 0–5)
SP GR UR STRIP: 1.01 (ref 1–1.03)
URINE REFLEX TO CULTURE: YES
UROBILINOGEN UR STRIP-ACNC: 1 E.U./DL
WBC #/AREA URNS AUTO: ABNORMAL /HPF (ref 0–5)

## 2024-11-26 PROCEDURE — 80307 DRUG TEST PRSMV CHEM ANLYZR: CPT

## 2024-11-26 PROCEDURE — 87086 URINE CULTURE/COLONY COUNT: CPT

## 2024-11-26 PROCEDURE — 99214 OFFICE O/P EST MOD 30 MIN: CPT | Performed by: OBSTETRICS & GYNECOLOGY

## 2024-11-26 PROCEDURE — 59025 FETAL NON-STRESS TEST: CPT | Performed by: OBSTETRICS & GYNECOLOGY

## 2024-11-26 PROCEDURE — 99283 EMERGENCY DEPT VISIT LOW MDM: CPT

## 2024-11-26 PROCEDURE — 81001 URINALYSIS AUTO W/SCOPE: CPT

## 2024-11-26 RX ORDER — NITROFURANTOIN 25; 75 MG/1; MG/1
100 CAPSULE ORAL EVERY 12 HOURS SCHEDULED
Status: DISCONTINUED | OUTPATIENT
Start: 2024-11-26 | End: 2024-11-26 | Stop reason: HOSPADM

## 2024-11-26 RX ORDER — NITROFURANTOIN 25; 75 MG/1; MG/1
100 CAPSULE ORAL EVERY 12 HOURS SCHEDULED
Qty: 20 CAPSULE | Refills: 0 | Status: SHIPPED | OUTPATIENT
Start: 2024-11-26 | End: 2024-12-06

## 2024-11-26 NOTE — FLOWSHEET NOTE
Pt ambulatory to triage with c/o spotting and pressure since last night.  Pt denies recent intercourse or leaking of fluid. Instructed on clean catch urine sample needed.

## 2024-11-26 NOTE — ED TRIAGE NOTES
Urine Negative mg/dL    Specific Gravity, UA 1.015    Blood, Urine MODERATE Abnormal     pH, Urine 6.5    Protein, UA Negative mg/dL    Urobilinogen, Urine 1.0 E.U./dL    Nitrite, Urine Negative    Leukocyte Esterase, Urine SMALL Abnormal     Urine Reflex to Culture Yes   Microscopic Urinalysis [[id number]] (Abnormal)    Collected: 24 1451    Updated: 24 1457     Bacteria, UA RARE Abnormal  /HPF    Hyaline Casts, UA 5-10 /HPF    WBC, UA 10-20 Abnormal  /HPF    RBC, UA 3-5 Abnormal  /HPF    Epithelial Cells, UA 6-10 /HPF       ASSESSMENT & PLAN:      Assessment:   28 yo  female @ 37.3 weeks with EDC 24 with vaginal spotting, signs of UTI.      Plan:   NST reactive. Reassurance given.   UA suggestive of UTI. Macrobid sent to pharmacy   Cervical Exam is unchanged.   Discharge home with labor and bleeding precautions, fetal kick counts, and symptom diary.   Follow up with Ob provider with next scheduled appointment.

## 2024-11-26 NOTE — FLOWSHEET NOTE
EFM explained and applied. Active fetal movement reported. Pt states she was examined in the office yesterday.  States the pressure and spotting happened after her appointment, but got worse later in the evening. States that she was 3cm in the office.

## 2024-11-28 LAB — BACTERIA UR CULT: NORMAL

## 2024-12-02 ENCOUNTER — ROUTINE PRENATAL (OUTPATIENT)
Dept: OBGYN CLINIC | Age: 29
End: 2024-12-02
Payer: COMMERCIAL

## 2024-12-02 VITALS
WEIGHT: 209 LBS | BODY MASS INDEX: 37.02 KG/M2 | HEART RATE: 112 BPM | SYSTOLIC BLOOD PRESSURE: 110 MMHG | DIASTOLIC BLOOD PRESSURE: 60 MMHG

## 2024-12-02 DIAGNOSIS — Z34.83 ENCOUNTER FOR SUPERVISION OF OTHER NORMAL PREGNANCY IN THIRD TRIMESTER: Primary | ICD-10-CM

## 2024-12-02 DIAGNOSIS — Z3A.38 38 WEEKS GESTATION OF PREGNANCY: ICD-10-CM

## 2024-12-02 PROCEDURE — 1036F TOBACCO NON-USER: CPT | Performed by: OBSTETRICS & GYNECOLOGY

## 2024-12-02 PROCEDURE — G8419 CALC BMI OUT NRM PARAM NOF/U: HCPCS | Performed by: OBSTETRICS & GYNECOLOGY

## 2024-12-02 PROCEDURE — G8484 FLU IMMUNIZE NO ADMIN: HCPCS | Performed by: OBSTETRICS & GYNECOLOGY

## 2024-12-02 PROCEDURE — G8427 DOCREV CUR MEDS BY ELIG CLIN: HCPCS | Performed by: OBSTETRICS & GYNECOLOGY

## 2024-12-02 PROCEDURE — 99213 OFFICE O/P EST LOW 20 MIN: CPT | Performed by: OBSTETRICS & GYNECOLOGY

## 2024-12-02 NOTE — PROGRESS NOTES
OB visit      Corinne Kaiser is a 28 y.o. year old female  @ L 2/10/24, 38.2 wks by 10 wk US not consistent with LMP , final KLEBER  .     POB: FTSVD @ 39 wks , girl , 7lb7oz , 2015             FTSVD @ 39 wks , girl             FTSVD @ 39 wks , girl              SAB x 1     PGYN: denies     PMH: denies     PSH: denies     MEDS: PNV     Drug Allergies: NKDA    SOCHX: neg x 3     FH: Mother or Father , DM No , HTN No, Other No  /60   Pulse (!) 112   Wt 94.8 kg (209 lb)   LMP 02/10/2024   BMI 37.02 kg/m²   Past Medical History:   Diagnosis Date    Abdominal cramping affecting pregnancy 2024    Herpes simplex virus (HSV) infection     Postpartum depression     Spontaneous       No past surgical history on file.      Review of Systems  Constitutional: negative  Genitourinary:see above  Integument/breast: negative  Behavioral/Psych: negative  Endocrine: negative     All other systems reviewed and are negative.       Physical Exam:  /60   Pulse (!) 112   Wt 94.8 kg (209 lb)   LMP 02/10/2024   BMI 37.02 kg/m²   Skin: Warm, dry, no lesions or rashes  Extremities: Without clubbing, cyanosis and edema. Palms and nails are normal. Ambulates without difficulty  Neurological: No gross sensory or motor deficits.  Abdomen: Soft, non-tender without masses or organomegaly  bowel sounds normoactive    HOF : 37 cm     FHT: 145 bpm     SVE - 3-4 cm/ 60%/ -2     Assessment:   1. Encounter for supervision of other normal pregnancy in third trimester    2. 38 weeks gestation of pregnancy        Plan:   No orders of the defined types were placed in this encounter.       No orders of the defined types were placed in this encounter.    Plan:   Patricia Morales MD    RTC in 1 weeks   First trimester transvaginal OB ultrasound done on 2024  Rodriguez gestation  Positive fetal heart tones at 160 bpm  Crown-rump length 34.6 mm consistent with 10 weeks and 3 days, final KLEBER is

## 2024-12-05 ENCOUNTER — HOSPITAL ENCOUNTER (OUTPATIENT)
Age: 29
Discharge: HOME | DRG: 560 | End: 2024-12-05
Attending: OBSTETRICS & GYNECOLOGY | Admitting: OBSTETRICS & GYNECOLOGY
Payer: COMMERCIAL

## 2024-12-05 VITALS
DIASTOLIC BLOOD PRESSURE: 63 MMHG | TEMPERATURE: 98.1 F | OXYGEN SATURATION: 98 % | RESPIRATION RATE: 16 BRPM | SYSTOLIC BLOOD PRESSURE: 119 MMHG | HEART RATE: 88 BPM

## 2024-12-05 PROBLEM — O26.893 PELVIC PAIN AFFECTING PREGNANCY IN THIRD TRIMESTER, ANTEPARTUM: Status: ACTIVE | Noted: 2024-12-05

## 2024-12-05 PROBLEM — O47.1 FALSE LABOR AFTER 37 WEEKS OF GESTATION WITHOUT DELIVERY: Status: ACTIVE | Noted: 2024-12-05

## 2024-12-05 PROBLEM — R10.2 PELVIC PAIN AFFECTING PREGNANCY IN THIRD TRIMESTER, ANTEPARTUM: Status: ACTIVE | Noted: 2024-12-05

## 2024-12-05 LAB
AMPHET UR QL SCN: NORMAL
BACTERIA URNS QL MICRO: ABNORMAL /HPF
BARBITURATES UR QL SCN: NORMAL
BENZODIAZ UR QL SCN: NORMAL
BILIRUB UR QL STRIP: NEGATIVE
CANNABINOIDS UR QL SCN: NORMAL
CLARITY UR: ABNORMAL
COCAINE UR QL SCN: NORMAL
COLOR UR: YELLOW
DRUG SCREEN COMMENT UR-IMP: NORMAL
EPI CELLS #/AREA URNS AUTO: ABNORMAL /HPF (ref 0–5)
FENTANYL SCREEN, URINE: NORMAL
GLUCOSE UR STRIP-MCNC: NEGATIVE MG/DL
HGB UR QL STRIP: NEGATIVE
HYALINE CASTS #/AREA URNS LPF: ABNORMAL /LPF (ref 0–5)
KETONES UR STRIP-MCNC: NEGATIVE MG/DL
LEUKOCYTE ESTERASE UR QL STRIP: ABNORMAL
METHADONE UR QL SCN: NORMAL
NITRITE UR QL STRIP: NEGATIVE
OPIATES UR QL SCN: NORMAL
OXYCODONE UR QL SCN: NORMAL
PCP UR QL SCN: NORMAL
PH UR STRIP: 6 [PH] (ref 5–9)
PROPOXYPH UR QL SCN: NORMAL
PROT UR STRIP-MCNC: ABNORMAL MG/DL
RBC #/AREA URNS AUTO: ABNORMAL /HPF (ref 0–5)
SP GR UR STRIP: 1.02 (ref 1–1.03)
URINE REFLEX TO CULTURE: YES
UROBILINOGEN UR STRIP-ACNC: 1 E.U./DL
WBC #/AREA URNS AUTO: >100 /HPF (ref 0–5)

## 2024-12-05 PROCEDURE — 99283 EMERGENCY DEPT VISIT LOW MDM: CPT

## 2024-12-05 PROCEDURE — 87086 URINE CULTURE/COLONY COUNT: CPT

## 2024-12-05 PROCEDURE — 80307 DRUG TEST PRSMV CHEM ANLYZR: CPT

## 2024-12-05 PROCEDURE — 81001 URINALYSIS AUTO W/SCOPE: CPT

## 2024-12-05 NOTE — FLOWSHEET NOTE
Pt stated she drops her kids off at school at 0800 and cannot come for her induction until 830. Rescheduled in [facility].

## 2024-12-05 NOTE — FLOWSHEET NOTE
Pt presents to triage for abdominal pain and vaginal bleeding incident from last night. Did not wear a pad and has stopped since then. Denies LOF.   Electronically signed by ALVA ESTRADA RN on 12/5/2024 at 8:47 AM

## 2024-12-05 NOTE — ED TRIAGE NOTES
Department of Obstetrics and Gynecology  Labor and Delivery  Farhan Prado MD: OB Triage Note      SUBJECTIVE:  Patient is a 30 yo  female @ 38.5 weeks with EDC 24.   She presents with complaints of pelvic pressure/pain and vaginal spotting. She was seen in office 2 days ago and she was 3 cm. She reports +FM, denies LOF, VD or recent coitus. She just completed a course of antibiotics for UTI. She presents for reassurance.     OBJECTIVE    ROS:  Gen: Negative  CV: Negative  Lungs: Negative  Abdomen: +FM, pain  Pelvis: pressure  Rest of systems reviewed and found to be negative.    Vitals:  /63 Comment: left  Pulse 88   Temp 98.1 °F (36.7 °C) (Oral)   Resp 16   LMP 02/10/2024   SpO2 98%     PE:   Gen: A x O x 3, in NAD  Abdomen: soft, gravid, +FM  Cervix:  No blood on glove with Cervical check           Dilation:  3         Effacement:  50         Station:  Ballotable          Consistency:  Soft         Position: Posterior    Fetal Position:  vertex    Membranes: Intact    Fetal heart rate:  Category I (REACTIVE)-duration 20 minutes        Baseline Heart Rate: 130s R       Accelerations:  present       Decelerations:  none       Variability:  moderate    Contraction frequency: Irritability    DATA:  Results     Component Value Units   Urine Drug Screen [[id number]]    Collected: 24 0849    Updated: 24 0905    Specimen Source: Urine     Amphetamine Screen, Ur Neg    Barbiturate Screen, Ur Neg    Benzodiazepine Screen, Urine Neg    Cannabinoid Scrn, Ur Neg    Cocaine Metabolite Screen, Urine Neg    Opiate Screen, Urine Neg    Phencyclidine (PCP), Screen, Urine Neg    Methadone Screen, Urine Neg    Propoxyphene Scrn, Ur Neg    Oxycodone Urine Neg    FENTANYL SCREEN, URINE Neg    Drug Screen Comment: see below    Comment: This method is a screening test to detect only these drug  classes as part of a medical workup.  Confirmatory testing  by another method should be ordered if  clinically indicated.      Urinalysis with Reflex to Culture [[id number]] (Abnormal)    Collected: 24    Updated: 24    Specimen Source: Urine     Color, UA Yellow    Clarity, UA CLOUDY Abnormal     Glucose, Ur Negative mg/dL    Bilirubin, Urine Negative    Ketones, Urine Negative mg/dL    Specific Gravity, UA 1.020    Blood, Urine Negative    pH, Urine 6.0    Protein, UA TRACE Abnormal  mg/dL    Urobilinogen, Urine 1.0 E.U./dL    Nitrite, Urine Negative    Leukocyte Esterase, Urine MODERATE Abnormal        ASSESSMENT & PLAN:      Assessment:   30 yo  female @ 38.5 weeks with EDC 24 here for vaginal spotting and pain.    Plan:   NST reactive. Reassurance given.   UA shows pyuria. Culture if indicated.    Patient encouraged to drink more water.   Cervical Exam is unchanged from office visit   Discharge home with precautions, fetal kick counts, and symptom diary.   Return on 2024 for elective IOL .

## 2024-12-06 LAB — BACTERIA UR CULT: NORMAL

## 2024-12-08 ENCOUNTER — HOSPITAL ENCOUNTER (INPATIENT)
Age: 29
LOS: 2 days | Discharge: HOME | DRG: 560 | End: 2024-12-10
Attending: OBSTETRICS & GYNECOLOGY | Admitting: OBSTETRICS & GYNECOLOGY
Payer: COMMERCIAL

## 2024-12-08 PROBLEM — Z78.9 ADMITTED TO LABOR AND DELIVERY: Status: ACTIVE | Noted: 2024-12-08

## 2024-12-08 LAB
ABO/RH: NORMAL
ALBUMIN SERPL-MCNC: 3.6 G/DL (ref 3.5–4.6)
ALP SERPL-CCNC: 122 U/L (ref 40–130)
ALT SERPL-CCNC: 8 U/L (ref 0–33)
ANION GAP SERPL CALCULATED.3IONS-SCNC: 16 MEQ/L (ref 9–15)
ANTIBODY SCREEN: NORMAL
AST SERPL-CCNC: 13 U/L (ref 0–35)
BILIRUB SERPL-MCNC: 0.6 MG/DL (ref 0.2–0.7)
BUN SERPL-MCNC: 5 MG/DL (ref 6–20)
CALCIUM SERPL-MCNC: 8.9 MG/DL (ref 8.5–9.9)
CHLORIDE SERPL-SCNC: 104 MEQ/L (ref 95–107)
CO2 SERPL-SCNC: 16 MEQ/L (ref 20–31)
CREAT SERPL-MCNC: 0.51 MG/DL (ref 0.5–0.9)
ERYTHROCYTE [DISTWIDTH] IN BLOOD BY AUTOMATED COUNT: 16.1 % (ref 11.5–14.5)
GLOBULIN SER CALC-MCNC: 2.9 G/DL (ref 2.3–3.5)
GLUCOSE SERPL-MCNC: 76 MG/DL (ref 70–99)
HBV SURFACE AG SERPL QL IA: NORMAL
HCT VFR BLD AUTO: 34.6 % (ref 37–47)
HGB BLD-MCNC: 11.1 G/DL (ref 12–16)
MCH RBC QN AUTO: 24.1 PG (ref 27–31.3)
MCHC RBC AUTO-ENTMCNC: 32.1 % (ref 33–37)
MCV RBC AUTO: 75.1 FL (ref 79.4–94.8)
PLATELET # BLD AUTO: 199 K/UL (ref 130–400)
POTASSIUM SERPL-SCNC: 3.8 MEQ/L (ref 3.4–4.9)
PROT SERPL-MCNC: 6.5 G/DL (ref 6.3–8)
RBC # BLD AUTO: 4.61 M/UL (ref 4.2–5.4)
SODIUM SERPL-SCNC: 136 MEQ/L (ref 135–144)
WBC # BLD AUTO: 10.4 K/UL (ref 4.8–10.8)

## 2024-12-08 PROCEDURE — 2580000003 HC RX 258

## 2024-12-08 PROCEDURE — 87340 HEPATITIS B SURFACE AG IA: CPT

## 2024-12-08 PROCEDURE — 80053 COMPREHEN METABOLIC PANEL: CPT

## 2024-12-08 PROCEDURE — 86900 BLOOD TYPING SEROLOGIC ABO: CPT

## 2024-12-08 PROCEDURE — 86850 RBC ANTIBODY SCREEN: CPT

## 2024-12-08 PROCEDURE — 86592 SYPHILIS TEST NON-TREP QUAL: CPT

## 2024-12-08 PROCEDURE — 86901 BLOOD TYPING SEROLOGIC RH(D): CPT

## 2024-12-08 PROCEDURE — 85027 COMPLETE CBC AUTOMATED: CPT

## 2024-12-08 PROCEDURE — 6370000000 HC RX 637 (ALT 250 FOR IP): Performed by: OBSTETRICS & GYNECOLOGY

## 2024-12-08 PROCEDURE — 88307 TISSUE EXAM BY PATHOLOGIST: CPT

## 2024-12-08 PROCEDURE — 1220000000 HC SEMI PRIVATE OB R&B

## 2024-12-08 RX ORDER — ONDANSETRON 2 MG/ML
4 INJECTION INTRAMUSCULAR; INTRAVENOUS EVERY 6 HOURS PRN
Status: DISCONTINUED | OUTPATIENT
Start: 2024-12-08 | End: 2024-12-10 | Stop reason: HOSPADM

## 2024-12-08 RX ORDER — SODIUM CHLORIDE, SODIUM LACTATE, POTASSIUM CHLORIDE, AND CALCIUM CHLORIDE .6; .31; .03; .02 G/100ML; G/100ML; G/100ML; G/100ML
500 INJECTION, SOLUTION INTRAVENOUS PRN
Status: DISCONTINUED | OUTPATIENT
Start: 2024-12-08 | End: 2024-12-10 | Stop reason: HOSPADM

## 2024-12-08 RX ORDER — FAMOTIDINE 20 MG/1
20 TABLET, FILM COATED ORAL 2 TIMES DAILY
Status: DISCONTINUED | OUTPATIENT
Start: 2024-12-08 | End: 2024-12-10 | Stop reason: HOSPADM

## 2024-12-08 RX ORDER — NALBUPHINE HYDROCHLORIDE 10 MG/ML
10 INJECTION INTRAMUSCULAR; INTRAVENOUS; SUBCUTANEOUS
Status: DISCONTINUED | OUTPATIENT
Start: 2024-12-08 | End: 2024-12-10 | Stop reason: HOSPADM

## 2024-12-08 RX ORDER — ACETAMINOPHEN 500 MG
1000 TABLET ORAL EVERY 8 HOURS SCHEDULED
Status: DISCONTINUED | OUTPATIENT
Start: 2024-12-08 | End: 2024-12-10 | Stop reason: HOSPADM

## 2024-12-08 RX ORDER — MODIFIED LANOLIN
OINTMENT (GRAM) TOPICAL PRN
Status: DISCONTINUED | OUTPATIENT
Start: 2024-12-08 | End: 2024-12-10 | Stop reason: HOSPADM

## 2024-12-08 RX ORDER — TERBUTALINE SULFATE 1 MG/ML
0.25 INJECTION, SOLUTION SUBCUTANEOUS
Status: ACTIVE | OUTPATIENT
Start: 2024-12-08 | End: 2024-12-09

## 2024-12-08 RX ORDER — ACETAMINOPHEN 325 MG/1
650 TABLET ORAL EVERY 4 HOURS PRN
Status: DISCONTINUED | OUTPATIENT
Start: 2024-12-08 | End: 2024-12-10 | Stop reason: HOSPADM

## 2024-12-08 RX ORDER — SODIUM CHLORIDE, SODIUM LACTATE, POTASSIUM CHLORIDE, CALCIUM CHLORIDE 600; 310; 30; 20 MG/100ML; MG/100ML; MG/100ML; MG/100ML
INJECTION, SOLUTION INTRAVENOUS
Status: COMPLETED
Start: 2024-12-08 | End: 2024-12-08

## 2024-12-08 RX ORDER — SODIUM CHLORIDE 0.9 % (FLUSH) 0.9 %
5-40 SYRINGE (ML) INJECTION EVERY 12 HOURS SCHEDULED
Status: DISCONTINUED | OUTPATIENT
Start: 2024-12-08 | End: 2024-12-10 | Stop reason: HOSPADM

## 2024-12-08 RX ORDER — SODIUM CHLORIDE 9 MG/ML
INJECTION, SOLUTION INTRAVENOUS PRN
Status: DISCONTINUED | OUTPATIENT
Start: 2024-12-08 | End: 2024-12-10 | Stop reason: HOSPADM

## 2024-12-08 RX ORDER — SODIUM CHLORIDE 9 MG/ML
25 INJECTION, SOLUTION INTRAVENOUS PRN
Status: DISCONTINUED | OUTPATIENT
Start: 2024-12-08 | End: 2024-12-10 | Stop reason: HOSPADM

## 2024-12-08 RX ORDER — ONDANSETRON 4 MG/1
4 TABLET, ORALLY DISINTEGRATING ORAL EVERY 6 HOURS PRN
Status: DISCONTINUED | OUTPATIENT
Start: 2024-12-08 | End: 2024-12-10 | Stop reason: HOSPADM

## 2024-12-08 RX ORDER — METHYLERGONOVINE MALEATE 0.2 MG/ML
200 INJECTION INTRAVENOUS PRN
Status: DISCONTINUED | OUTPATIENT
Start: 2024-12-08 | End: 2024-12-10 | Stop reason: HOSPADM

## 2024-12-08 RX ORDER — SODIUM CHLORIDE 0.9 % (FLUSH) 0.9 %
5-40 SYRINGE (ML) INJECTION PRN
Status: DISCONTINUED | OUTPATIENT
Start: 2024-12-08 | End: 2024-12-10 | Stop reason: HOSPADM

## 2024-12-08 RX ORDER — PENICILLIN G 3000000 [IU]/50ML
2.5 INJECTION, SOLUTION INTRAVENOUS EVERY 4 HOURS
Status: DISCONTINUED | OUTPATIENT
Start: 2024-12-08 | End: 2024-12-08

## 2024-12-08 RX ORDER — DOCUSATE SODIUM 100 MG/1
100 CAPSULE, LIQUID FILLED ORAL 2 TIMES DAILY
Status: DISCONTINUED | OUTPATIENT
Start: 2024-12-08 | End: 2024-12-10 | Stop reason: HOSPADM

## 2024-12-08 RX ORDER — CARBOPROST TROMETHAMINE 250 UG/ML
250 INJECTION, SOLUTION INTRAMUSCULAR PRN
Status: DISCONTINUED | OUTPATIENT
Start: 2024-12-08 | End: 2024-12-10 | Stop reason: HOSPADM

## 2024-12-08 RX ORDER — SODIUM CHLORIDE, SODIUM LACTATE, POTASSIUM CHLORIDE, CALCIUM CHLORIDE 600; 310; 30; 20 MG/100ML; MG/100ML; MG/100ML; MG/100ML
INJECTION, SOLUTION INTRAVENOUS CONTINUOUS
Status: DISCONTINUED | OUTPATIENT
Start: 2024-12-08 | End: 2024-12-10 | Stop reason: HOSPADM

## 2024-12-08 RX ORDER — MISOPROSTOL 200 UG/1
400 TABLET ORAL PRN
Status: DISCONTINUED | OUTPATIENT
Start: 2024-12-08 | End: 2024-12-10 | Stop reason: HOSPADM

## 2024-12-08 RX ORDER — IBUPROFEN 800 MG/1
800 TABLET, FILM COATED ORAL EVERY 8 HOURS SCHEDULED
Status: DISCONTINUED | OUTPATIENT
Start: 2024-12-08 | End: 2024-12-10 | Stop reason: HOSPADM

## 2024-12-08 RX ADMIN — IBUPROFEN 800 MG: 800 TABLET, FILM COATED ORAL at 21:15

## 2024-12-08 RX ADMIN — ACETAMINOPHEN 1000 MG: 500 TABLET ORAL at 21:15

## 2024-12-08 RX ADMIN — SODIUM CHLORIDE, POTASSIUM CHLORIDE, SODIUM LACTATE AND CALCIUM CHLORIDE 500 ML: 600; 310; 30; 20 INJECTION, SOLUTION INTRAVENOUS at 11:49

## 2024-12-08 RX ADMIN — ACETAMINOPHEN 1000 MG: 500 TABLET ORAL at 13:27

## 2024-12-08 RX ADMIN — IBUPROFEN 800 MG: 800 TABLET, FILM COATED ORAL at 13:26

## 2024-12-08 RX ADMIN — Medication 166.7 ML: at 11:57

## 2024-12-08 RX ADMIN — BENZOCAINE AND LEVOMENTHOL: 200; 5 SPRAY TOPICAL at 13:26

## 2024-12-08 RX ADMIN — SODIUM CHLORIDE, SODIUM LACTATE, POTASSIUM CHLORIDE, AND CALCIUM CHLORIDE 500 ML: .6; .31; .03; .02 INJECTION, SOLUTION INTRAVENOUS at 11:49

## 2024-12-08 ASSESSMENT — PAIN DESCRIPTION - LOCATION: LOCATION: VAGINA

## 2024-12-08 ASSESSMENT — PAIN SCALES - GENERAL
PAINLEVEL_OUTOF10: 3
PAINLEVEL_OUTOF10: 0
PAINLEVEL_OUTOF10: 3
PAINLEVEL_OUTOF10: 0

## 2024-12-08 ASSESSMENT — PAIN DESCRIPTION - DESCRIPTORS: DESCRIPTORS: BURNING;ACHING

## 2024-12-08 NOTE — PROGRESS NOTES
Pt here with complaints of ctx for about last hour, rating 10/10, no bleeding, no leaking, has felt movement but not as much

## 2024-12-08 NOTE — L&D DELIVERY NOTE
BunkieDavid Corinne [[id number]]      Labor Events     Labor: No  Cervical Ripening Date/Time:      Rupture Identifier: Sac 1  Rupture Date/Time:  24 11:54:00   Rupture Type: SROM  Fluid Color: Clear  Labor Complications: None       Anesthesia    Method: None       Labor Event Times      Labor onset date/time:        Dilation complete date/time:  24 11:52:00 EST     Start pushing date/time:  2024 11:54:00   Decision date/time (emergent ):            Delivery Details      Delivery Date: 24 Delivery Time: 11:55:00   Delivery Type: Vaginal, Spontaneous              [place] Presentation    Presentation: Vertex  Position: Left  _: Occiput  _: Anterior       Shoulder Dystocia    Shoulder Dystocia Present?: No       Assisted Delivery Details    Forceps Attempted?: No  Vacuum Extractor Attempted?: No                           Cord    Vessels: 3 Vessels  Complications: Nuchal Loose  Cord Clamped Date/Time: 2024 11:56:00  Cord Blood Disposition: Lab              Placenta    Date/Time: 2024 12:01:10  Removal: Spontaneous  Appearance: Intact  Disposition: Lab, Pathology       Lacerations    Episiotomy: None  Perineal Lacerations: None  Other Lacerations: no non-perineal laceration       Vaginal Counts    Initial Count Personnel: ML  Initial Count Verified By: BG  Intial Sponge Count: Correct Intial Needles Count: Correct Intial Instruments Count: Correct   Final Sponges Count: Correct Final Needles  Count: Correct Final Instruments Count: Correct   Final Count Personnel: DR ALANIZ  Final Count Verified By: BN  Accurate Final Count?: Yes       Blood Loss  Mother: Corinne Kaiser S #[id number]     Start of Mother's Information      Delivery Blood Loss   Intrapartum & Postpartum: 24 235 - 24 1205    Delivery Admission: 24 2355 - 24 1205         Intrapartum & Postpartum Delivery Admission    None                  End of Mother's Information  Mother:

## 2024-12-08 NOTE — FLOWSHEET NOTE
Pt assisted to the bathroom, gait steady.  Pt unable to void.  Pericare reviewed/done, Dermoplast spray applied.  Pads changed, panties applied.  Pt to chair while room cleaned.

## 2024-12-08 NOTE — FLOWSHEET NOTE
Dr Correa at the bedside.  1154-AROM per Dr Correa, scant fluid noted.  Pt pushing.  Fetal heart rate and uterine activity evaluated every 5 minutes while pt is pushing. RN and  remains continuously at bedside.   1155- live male.

## 2024-12-08 NOTE — L&D DELIVERY NOTE
DuluthDavid Corinne [[id number]]      Labor Events     Labor: No  Cervical Ripening Date/Time:      Rupture Identifier: Sac 1  Rupture Date/Time:  24 11:54:00   Rupture Type: SROM  Fluid Color: Clear  Labor Complications: None       Anesthesia    Method: None       Labor Event Times      Labor onset date/time:        Dilation complete date/time:  24 11:52:00 EST     Start pushing date/time:  2024 11:54:00   Decision date/time (emergent ):            Delivery Details      Delivery Date: 24 Delivery Time: 11:55:00   Delivery Type: Vaginal, Spontaneous              [place] Presentation    Presentation: Vertex  Position: Left  _: Occiput  _: Anterior       Shoulder Dystocia    Shoulder Dystocia Present?: No       Assisted Delivery Details    Forceps Attempted?: No  Vacuum Extractor Attempted?: No                           Cord    Vessels: 3 Vessels  Complications: Nuchal Loose  Cord Clamped Date/Time: 2024 11:56:00  Cord Blood Disposition: Lab              Placenta    Date/Time: 2024 12:01:10  Removal: Spontaneous  Appearance: Intact  Disposition: Lab, Pathology       Lacerations    Episiotomy: None  Perineal Lacerations: None  Other Lacerations: no non-perineal laceration       Vaginal Counts    Initial Count Personnel: ML  Initial Count Verified By: BG  Intial Sponge Count: Correct Intial Needles Count: Correct Intial Instruments Count: Correct   Final Sponges Count: Correct Final Needles  Count: Correct Final Instruments Count: Correct   Final Count Personnel: DR ALANIZ  Final Count Verified By: BN  Accurate Final Count?: Yes       Blood Loss  Mother: Corinne Kaiser S #[id number]     Start of Mother's Information      Delivery Blood Loss   Intrapartum & Postpartum: 24 - 24 1206    Delivery Admission: 24 235 - 24 1206         Intrapartum & Postpartum Delivery Admission    None                  End of Mother's Information  Mother:

## 2024-12-08 NOTE — PROGRESS NOTES
Pt wheeled to room, complaints of ctx for about an hour, denies leaking,denies vaginal bleeding, denies problems with pregnancy, rates pain 10/10 with ctx. Into gown and placed on monitor ,sve done pt 8/90/0. Staff made aware

## 2024-12-08 NOTE — H&P
problems. No visual  changes  Respiratory: No history of respiratory/pulmonary symptoms or problems.  Cardiovascular: No history of cardiovascular symptoms or problems.  GI: No history of GI symptoms or problems.  : No history of UTI in past 6 weeks. No history of  symptoms or problems.      PHYSICAL EXAMINATION:         GENERAL: pleasant, female in no apparent distress  ABDOMEN: soft, non-tender and no masses  PELVIC:  Cervix: 10 cm 100 %   SROM while exam     NST : Reactive, Cat 1 tracing, +ve acceleration , no deceleration     Assessment and Plan    Corinne Kaiser is 29 y.o. female, , who is at 39w1d.        Plan:  Admit to L& D  GBS prophylaxis      Continuous fetal monitoring           Shad Correa MD

## 2024-12-09 PROBLEM — Z3A.39 PREGNANCY WITH 39 COMPLETED WEEKS GESTATION: Status: ACTIVE | Noted: 2024-12-09

## 2024-12-09 LAB
AMPHET UR QL SCN: NORMAL
BACTERIA URNS QL MICRO: NEGATIVE /HPF
BARBITURATES UR QL SCN: NORMAL
BENZODIAZ UR QL SCN: NORMAL
BILIRUB UR QL STRIP: NEGATIVE
CANNABINOIDS UR QL SCN: NORMAL
CLARITY UR: CLEAR
COCAINE UR QL SCN: NORMAL
COLOR UR: YELLOW
DRUG SCREEN COMMENT UR-IMP: NORMAL
EPI CELLS #/AREA URNS AUTO: ABNORMAL /HPF (ref 0–5)
ERYTHROCYTE [DISTWIDTH] IN BLOOD BY AUTOMATED COUNT: 15.9 % (ref 11.5–14.5)
FENTANYL SCREEN, URINE: NORMAL
GLUCOSE UR STRIP-MCNC: NEGATIVE MG/DL
HCT VFR BLD AUTO: 30.4 % (ref 37–47)
HGB BLD-MCNC: 9.7 G/DL (ref 12–16)
HGB UR QL STRIP: ABNORMAL
HYALINE CASTS #/AREA URNS AUTO: ABNORMAL /HPF (ref 0–5)
KETONES UR STRIP-MCNC: NEGATIVE MG/DL
LEUKOCYTE ESTERASE UR QL STRIP: NEGATIVE
MCH RBC QN AUTO: 23.9 PG (ref 27–31.3)
MCHC RBC AUTO-ENTMCNC: 31.9 % (ref 33–37)
MCV RBC AUTO: 74.9 FL (ref 79.4–94.8)
METHADONE UR QL SCN: NORMAL
NITRITE UR QL STRIP: NEGATIVE
OPIATES UR QL SCN: NORMAL
OXYCODONE UR QL SCN: NORMAL
PCP UR QL SCN: NORMAL
PH UR STRIP: 6 [PH] (ref 5–9)
PLATELET # BLD AUTO: 202 K/UL (ref 130–400)
PROPOXYPH UR QL SCN: NORMAL
PROT UR STRIP-MCNC: NEGATIVE MG/DL
RBC # BLD AUTO: 4.06 M/UL (ref 4.2–5.4)
RBC #/AREA URNS AUTO: ABNORMAL /HPF (ref 0–5)
REAGIN+T PALLIDUM IGG+IGM SERPL-IMP: NORMAL
REASON FOR REJECTION: NORMAL
REJECTED TEST: NORMAL
SP GR UR STRIP: 1.01 (ref 1–1.03)
URINE REFLEX TO CULTURE: ABNORMAL
UROBILINOGEN UR STRIP-ACNC: 1 E.U./DL
WBC # BLD AUTO: 9.9 K/UL (ref 4.8–10.8)
WBC #/AREA URNS AUTO: ABNORMAL /HPF (ref 0–5)

## 2024-12-09 PROCEDURE — 1220000000 HC SEMI PRIVATE OB R&B

## 2024-12-09 PROCEDURE — 85027 COMPLETE CBC AUTOMATED: CPT

## 2024-12-09 PROCEDURE — 99024 POSTOP FOLLOW-UP VISIT: CPT | Performed by: OBSTETRICS & GYNECOLOGY

## 2024-12-09 PROCEDURE — 80307 DRUG TEST PRSMV CHEM ANLYZR: CPT

## 2024-12-09 PROCEDURE — 6370000000 HC RX 637 (ALT 250 FOR IP): Performed by: OBSTETRICS & GYNECOLOGY

## 2024-12-09 PROCEDURE — 81001 URINALYSIS AUTO W/SCOPE: CPT

## 2024-12-09 RX ADMIN — FAMOTIDINE 20 MG: 20 TABLET, FILM COATED ORAL at 08:03

## 2024-12-09 RX ADMIN — ACETAMINOPHEN 1000 MG: 500 TABLET ORAL at 19:25

## 2024-12-09 RX ADMIN — IBUPROFEN 800 MG: 800 TABLET, FILM COATED ORAL at 19:26

## 2024-12-09 RX ADMIN — DOCUSATE SODIUM 100 MG: 100 CAPSULE, LIQUID FILLED ORAL at 08:02

## 2024-12-09 RX ADMIN — ACETAMINOPHEN 1000 MG: 500 TABLET ORAL at 08:04

## 2024-12-09 RX ADMIN — IBUPROFEN 800 MG: 800 TABLET, FILM COATED ORAL at 08:04

## 2024-12-09 ASSESSMENT — PAIN SCALES - GENERAL
PAINLEVEL_OUTOF10: 4
PAINLEVEL_OUTOF10: 0
PAINLEVEL_OUTOF10: 1

## 2024-12-09 ASSESSMENT — PAIN DESCRIPTION - ORIENTATION: ORIENTATION: LOWER

## 2024-12-09 ASSESSMENT — PAIN DESCRIPTION - LOCATION: LOCATION: PERINEUM

## 2024-12-09 NOTE — PLAN OF CARE
Problem: Postpartum  Goal: Experiences normal postpartum course  Description:  Postpartum OB-Pregnancy care plan goal which identifies if the mother is experiencing a normal postpartum course  2024 0742 by Stacie Quiros RN  Outcome: Progressing  2024 by Stacie Zarco RN  Outcome: Progressing  Goal: Appropriate maternal -  bonding  Description:  Postpartum OB-Pregnancy care plan goal which identifies if the mother and  are bonding appropriately  2024 07 by Stacie Quiros RN  Outcome: Progressing  2024 by Stacie Zarco RN  Outcome: Progressing  Goal: Establishment of infant feeding pattern  Description:  Postpartum OB-Pregnancy care plan goal which identifies if the mother is establishing a feeding pattern with their   2024 07 by Stacie Quiros RN  Outcome: Progressing  2024 by Stacie Zarco RN  Outcome: Progressing  Goal: Incisions, wounds, or drain sites healing without S/S of infection  2024 by Stacie Quiros RN  Outcome: Progressing  2024 by Stacie Zarco RN  Outcome: Progressing     Problem: Pain  Goal: Verbalizes/displays adequate comfort level or baseline comfort level  2024 by Stacie Quiros RN  Outcome: Progressing  2024 by Stacie Zarco RN  Outcome: Progressing     Problem: Infection - Adult  Goal: Absence of infection at discharge  2024 07 by Stacie Quiros RN  Outcome: Progressing  2024 by Stacie Zarco RN  Outcome: Progressing  Goal: Absence of infection during hospitalization  2024 07 by Stacie Quiros RN  Outcome: Progressing  2024 by Stacie Zarco RN  Outcome: Progressing  Goal: Absence of fever/infection during anticipated neutropenic period  2024 07 by Stacie Quiros RN  Outcome: Progressing  2024 by Stacie Zarco RN  Outcome: Progressing     Problem: Safety - Adult  Goal: Free from fall injury  2024

## 2024-12-09 NOTE — PLAN OF CARE
Problem: Vaginal Birth or  Section  Goal: Fetal and maternal status remain reassuring during the birth process  Description:  Birth OB-Pregnancy care plan goal which identifies if the fetal and maternal status remain reassuring during the birth process  2024 1147 by Gisselle Garcia RN  Outcome: Progressing     Problem: Postpartum  Goal: Experiences normal postpartum course  Description:  Postpartum OB-Pregnancy care plan goal which identifies if the mother is experiencing a normal postpartum course  2024 2016 by Stacie Zarco RN  Outcome: Progressing  2024 1147 by Gisselle Garcia RN  Outcome: Progressing  Goal: Appropriate maternal -  bonding  Description:  Postpartum OB-Pregnancy care plan goal which identifies if the mother and  are bonding appropriately  2024 by Stacie Zarco RN  Outcome: Progressing  2024 1147 by Gisselle Garcia RN  Outcome: Progressing  Goal: Establishment of infant feeding pattern  Description:  Postpartum OB-Pregnancy care plan goal which identifies if the mother is establishing a feeding pattern with their   2024 2016 by Stacie Zarco RN  Outcome: Progressing  2024 1147 by Gisselle Garcia RN  Outcome: Progressing  Goal: Incisions, wounds, or drain sites healing without S/S of infection  2024 by Stacie Zarco RN  Outcome: Progressing  2024 1147 by Gisselle Garcia RN  Outcome: Progressing     Problem: Pain  Goal: Verbalizes/displays adequate comfort level or baseline comfort level  2024 2016 by Stacie Zarco RN  Outcome: Progressing  2024 1147 by Gisselle Garcia RN  Outcome: Progressing     Problem: Infection - Adult  Goal: Absence of infection at discharge  2024 by Stacie Zarco RN  Outcome: Progressing  2024 1147 by Gisselle Garcia RN  Outcome: Progressing  Goal: Absence of

## 2024-12-09 NOTE — PROGRESS NOTES
Department of Obstetrics and Gynecology  Labor and Delivery  Farhan Prado MD: Post Partum Progress Note      SUBJECTIVE:  Patient is doing well. Pain is well controlled. Lochia is less than menses. She is afebrile. She is breast and bottle feeding. She is eating, ambulating voiding without difficulty.     OBJECTIVE:      ROS:   Abdomen: +cramping  Gyn: Lochia  All systems were reviewed and found to be negative.     Vitals:  BP (!) 111/55   Pulse 71   Temp 97.7 °F (36.5 °C) (Oral)   Resp 17   LMP 02/10/2024   SpO2 99%   Breastfeeding Unknown     PE:   Gen: AxO x 3, In NAD  CV: RRR, no M/R/G  Lungs: CTAB, No W/C/R  Abdomen: soft, +BS, NTND  Uterus: Firm @ U-1  Ex: No C/C/E    DATA:    Results     Component Value Units   CBC [[id number]] (Abnormal)    Collected: 12/09/24 0416    Updated: 12/09/24 0451    Specimen Type: Blood     WBC 9.9 K/uL    RBC 4.06 Low  M/uL    Hemoglobin 9.7 Low  g/dL    Hematocrit 30.4 Low  %    MCV 74.9 Low  fL    MCH 23.9 Low  pg    MCHC 31.9 Low  %    RDW 15.9 High  %    Platelets 202 K/uL   SPECIMEN REJECTION [[id number]]    Collected: 12/09/24 0436    Updated: 12/09/24 0436     Rejected Test FETAL    Reason for Rejection see below    Comment: Unable to perform testing; specimen mislabeled.  To perform  testing the specimen will need to be recollected.  Mislabel      DRUG SCREEN MULTI URINE [[id number]]    Collected: 12/09/24 0023    Updated: 12/09/24 0039    Specimen Type: Urine     Amphetamine Screen, Ur Neg    Barbiturate Screen, Ur Neg    Benzodiazepine Screen, Urine Neg    Cannabinoid Scrn, Ur Neg    Cocaine Metabolite Screen, Urine Neg    Opiate Screen, Urine Neg    Phencyclidine (PCP), Screen, Urine Neg    Methadone Screen, Urine Neg    Propoxyphene Scrn, Ur Neg    Oxycodone Urine Neg    FENTANYL SCREEN, URINE Neg    Drug Screen Comment: see below    Comment: This method is a screening test to detect only these drug  classes as part of a medical workup.  Confirmatory

## 2024-12-10 VITALS
TEMPERATURE: 97.8 F | OXYGEN SATURATION: 99 % | SYSTOLIC BLOOD PRESSURE: 109 MMHG | DIASTOLIC BLOOD PRESSURE: 58 MMHG | HEART RATE: 76 BPM | RESPIRATION RATE: 18 BRPM

## 2024-12-10 PROCEDURE — 6370000000 HC RX 637 (ALT 250 FOR IP): Performed by: OBSTETRICS & GYNECOLOGY

## 2024-12-10 PROCEDURE — 7200000001 HC VAGINAL DELIVERY

## 2024-12-10 PROCEDURE — 99024 POSTOP FOLLOW-UP VISIT: CPT | Performed by: OBSTETRICS & GYNECOLOGY

## 2024-12-10 RX ORDER — PSEUDOEPHEDRINE HCL 30 MG
100 TABLET ORAL DAILY
Qty: 30 CAPSULE | Refills: 1 | Status: SHIPPED | OUTPATIENT
Start: 2024-12-10 | End: 2025-01-09

## 2024-12-10 RX ORDER — IBUPROFEN 800 MG/1
800 TABLET, FILM COATED ORAL EVERY 8 HOURS SCHEDULED
Qty: 30 TABLET | Refills: 1 | Status: SHIPPED | OUTPATIENT
Start: 2024-12-10 | End: 2024-12-20

## 2024-12-10 RX ADMIN — IBUPROFEN 800 MG: 800 TABLET, FILM COATED ORAL at 06:13

## 2024-12-10 RX ADMIN — ACETAMINOPHEN 1000 MG: 500 TABLET ORAL at 06:13

## 2024-12-10 ASSESSMENT — PAIN SCALES - GENERAL: PAINLEVEL_OUTOF10: 6

## 2024-12-10 ASSESSMENT — PAIN DESCRIPTION - LOCATION: LOCATION: BACK

## 2024-12-10 NOTE — FLOWSHEET NOTE
Discharge   care booklet, Discharge instructions, and safe sleep information reviewed with patient. Questions answered.  Pt verbalizes understanding. Pt discharged with all belongings.

## 2024-12-10 NOTE — DISCHARGE SUMMARY
Discharge Summary    Corinne Kaiser  :  1995  MRN:  [id number]    ADMIT DATE:  2024  DISCHARGE DATE:  12/10/2024    PRIMARY CARE PHYSICIAN:  Lorna Salazar APRN - NP    VISIT STATUS: Admission    CODE STATUS:  Full Code    DISCHARGE DIAGNOSES:  Principal Problem:    Admitted to labor and delivery  Active Problems:    Encounter for vaginal delivery    Pregnancy with 39 completed weeks gestation  Resolved Problems:    * No resolved hospital problems. *      HOSPITAL COURSE:  Patient presented in spontaneous labor at 39.1 weeks. She was admitted for expectant management and was delivered of viable male infant 2024 @ 1155, weighing 7 lbs, 3 oz ( 3268 grams) with Apgars of 8/9. Please refer to delivery notes for further details of labor progress. On PPD#2, patient was stable and requesting discharge. She was discharged home with instructions, medications and follow up with Dr. Morales in 4 weeks.   CONSULTANTS:  Lactation  RECOMMENDED NEXT STEPS:   Discharge home     DISCHARGE MEDICATIONS:         Medication List        START taking these medications      docusate 100 MG Caps  Commonly known as: COLACE, DULCOLAX  Take 100 mg by mouth daily     ibuprofen 800 MG tablet  Commonly known as: ADVIL;MOTRIN  Take 1 tablet by mouth every 8 hours for 10 days            CONTINUE taking these medications      prenatal vitamin 27-1 MG Tabs tablet            ASK your doctor about these medications      ondansetron 4 MG tablet  Commonly known as: Zofran  Take 1 tablet by mouth every 6 hours as needed for Nausea or Vomiting 1 tablet every 6 hrs prn for nausea and vomiting.     valACYclovir 500 MG tablet  Commonly known as: Valtrex  Take 1 tablet by mouth 2 times daily               Where to Get Your Medications        These medications were sent to [facility] - [place], [place] - [address] - P [phone] - F [phone] [address] [place] [address] [place] [address]      Phone: [phone]   docusate 100 MG

## 2025-01-27 ENCOUNTER — OFFICE VISIT (OUTPATIENT)
Dept: OBGYN CLINIC | Age: 30
End: 2025-01-27
Payer: COMMERCIAL

## 2025-01-27 VITALS
SYSTOLIC BLOOD PRESSURE: 108 MMHG | DIASTOLIC BLOOD PRESSURE: 60 MMHG | HEIGHT: 63 IN | WEIGHT: 194 LBS | HEART RATE: 68 BPM | BODY MASS INDEX: 34.38 KG/M2

## 2025-01-27 DIAGNOSIS — G89.29 CHRONIC PELVIC PAIN IN FEMALE: ICD-10-CM

## 2025-01-27 DIAGNOSIS — R10.2 CHRONIC PELVIC PAIN IN FEMALE: ICD-10-CM

## 2025-01-27 DIAGNOSIS — N93.9 ABNORMAL UTERINE BLEEDING (AUB): Primary | ICD-10-CM

## 2025-01-27 PROCEDURE — 1036F TOBACCO NON-USER: CPT | Performed by: OBSTETRICS & GYNECOLOGY

## 2025-01-27 PROCEDURE — G8427 DOCREV CUR MEDS BY ELIG CLIN: HCPCS | Performed by: OBSTETRICS & GYNECOLOGY

## 2025-01-27 PROCEDURE — 99213 OFFICE O/P EST LOW 20 MIN: CPT | Performed by: OBSTETRICS & GYNECOLOGY

## 2025-01-27 PROCEDURE — G8417 CALC BMI ABV UP PARAM F/U: HCPCS | Performed by: OBSTETRICS & GYNECOLOGY

## 2025-01-27 ASSESSMENT — PATIENT HEALTH QUESTIONNAIRE - PHQ9
SUM OF ALL RESPONSES TO PHQ QUESTIONS 1-9: 0
1. LITTLE INTEREST OR PLEASURE IN DOING THINGS: NOT AT ALL
SUM OF ALL RESPONSES TO PHQ9 QUESTIONS 1 & 2: 0
2. FEELING DOWN, DEPRESSED OR HOPELESS: NOT AT ALL
SUM OF ALL RESPONSES TO PHQ QUESTIONS 1-9: 0

## 2025-01-27 NOTE — PROGRESS NOTES
Corinne Kaiser is a 29 y.o. female who presents here today for complaints of Postpartum Care (Delivered 24.)        History of Present Illness  The patient presents for evaluation of pelvic pain.    She reports experiencing severe menstrual cycles with heavy bleeding , which are accompanied by significant pelvic discomfort. She is considering endometrial ablation as a potential treatment option.    She does not feel depressed.    Supplemental Information  She tested positive for GBS in 2024.     Patient seeking definitive treatment after being done with childbaring. Patient declines any hormonal treatments due to side effects , especially on her weight. Patient mentions she wants to lose weight and \" be healthier\" , she does not want pills or any other methods especially that she is \"done with her pregnancies\" .     Vitals:  /60 (Site: Right Upper Arm, Position: Sitting, Cuff Size: Medium Adult)   Pulse 68   Ht 1.6 m (5' 3\")   Wt 88 kg (194 lb)   LMP 02/10/2024   Breastfeeding No   BMI 34.37 kg/m²   Allergies:  Patient has no known allergies.  Past Medical History:   Diagnosis Date    Abdominal cramping affecting pregnancy 2024    Herpes simplex virus (HSV) infection     Postpartum depression     Spontaneous       No past surgical history on file.  OB History          6    Para   4    Term   4       0    AB   2    Living   4         SAB   1    IAB   1    Ectopic   0    Molar   0    Multiple   0    Live Births   4              Family History   Problem Relation Age of Onset    Breast Cancer Neg Hx     Ovarian Cancer Neg Hx     Cervical Cancer Neg Hx     Uterine Cancer Neg Hx      Social History     Socioeconomic History    Marital status: Single     Spouse name: Not on file    Number of children: Not on file    Years of education: Not on file    Highest education level: Not on file   Occupational History    Not on file   Tobacco Use    Smoking status: Never

## 2025-01-28 ENCOUNTER — HOSPITAL ENCOUNTER (OUTPATIENT)
Dept: ULTRASOUND IMAGING | Age: 30
Discharge: HOME | End: 2025-01-30
Attending: OBSTETRICS & GYNECOLOGY
Payer: COMMERCIAL

## 2025-01-28 DIAGNOSIS — G89.29 CHRONIC PELVIC PAIN IN FEMALE: ICD-10-CM

## 2025-01-28 DIAGNOSIS — N93.9 ABNORMAL UTERINE BLEEDING (AUB): ICD-10-CM

## 2025-01-28 DIAGNOSIS — R10.2 CHRONIC PELVIC PAIN IN FEMALE: ICD-10-CM

## 2025-01-28 PROCEDURE — 76830 TRANSVAGINAL US NON-OB: CPT

## 2025-01-28 PROCEDURE — 76856 US EXAM PELVIC COMPLETE: CPT

## 2025-01-28 PROCEDURE — 93975 VASCULAR STUDY: CPT

## 2025-02-03 ENCOUNTER — PREP FOR PROCEDURE (OUTPATIENT)
Dept: OBGYN CLINIC | Age: 30
End: 2025-02-03

## 2025-02-03 ENCOUNTER — OFFICE VISIT (OUTPATIENT)
Dept: OBGYN CLINIC | Age: 30
End: 2025-02-03
Payer: COMMERCIAL

## 2025-02-03 VITALS
DIASTOLIC BLOOD PRESSURE: 60 MMHG | HEART RATE: 72 BPM | SYSTOLIC BLOOD PRESSURE: 108 MMHG | BODY MASS INDEX: 34.02 KG/M2 | WEIGHT: 192 LBS | HEIGHT: 63 IN

## 2025-02-03 DIAGNOSIS — N93.9 ABNORMAL UTERINE BLEEDING (AUB): Primary | ICD-10-CM

## 2025-02-03 DIAGNOSIS — N93.9 ABNORMAL UTERINE BLEEDING (AUB): ICD-10-CM

## 2025-02-03 DIAGNOSIS — R10.2 CHRONIC PELVIC PAIN IN FEMALE: ICD-10-CM

## 2025-02-03 DIAGNOSIS — G89.29 CHRONIC PELVIC PAIN IN FEMALE: ICD-10-CM

## 2025-02-03 DIAGNOSIS — G43.809 OTHER MIGRAINE WITHOUT STATUS MIGRAINOSUS, NOT INTRACTABLE: ICD-10-CM

## 2025-02-03 PROCEDURE — G8417 CALC BMI ABV UP PARAM F/U: HCPCS | Performed by: OBSTETRICS & GYNECOLOGY

## 2025-02-03 PROCEDURE — G8427 DOCREV CUR MEDS BY ELIG CLIN: HCPCS | Performed by: OBSTETRICS & GYNECOLOGY

## 2025-02-03 PROCEDURE — 99214 OFFICE O/P EST MOD 30 MIN: CPT | Performed by: OBSTETRICS & GYNECOLOGY

## 2025-02-03 PROCEDURE — 1036F TOBACCO NON-USER: CPT | Performed by: OBSTETRICS & GYNECOLOGY

## 2025-02-03 RX ORDER — BUTALBITAL, ACETAMINOPHEN AND CAFFEINE 50; 325; 40 MG/1; MG/1; MG/1
2 TABLET ORAL EVERY 6 HOURS PRN
Qty: 40 TABLET | Refills: 0 | Status: SHIPPED | OUTPATIENT
Start: 2025-02-03

## 2025-02-03 RX ORDER — SUMATRIPTAN SUCCINATE 25 MG/1
25 TABLET ORAL
Qty: 30 TABLET | Refills: 1 | Status: SHIPPED | OUTPATIENT
Start: 2025-02-03 | End: 2025-02-03

## 2025-02-03 NOTE — PROGRESS NOTES
Corinne Kaiser is a 29 y.o. female who presents here today for complaints of Follow-up (US done 25.)        History of Present Illness  The patient presents for evaluation of adenomyosis.    She reports persistent fatigue and ongoing pelvic discomfort and heavy bleeding, here for follow up on US results. She has expressed a definitive decision against future childbirth. Her last delivery was on 2024, and she experienced severe pain during her menstrual cycle postpartum, reminiscent of labor pains.  Patient also complaining of severe migraines that would stay days at a time according to her description, not resolving with NSAIDs or extra strength Tylenol.  Patient mentions that she did have occasional migraines in the past but not as bad as severe as her current migraines.      Vitals:  /60 (Site: Right Upper Arm, Position: Sitting, Cuff Size: Medium Adult)   Pulse 72   Ht 1.6 m (5' 3\")   Wt 87.1 kg (192 lb)   BMI 34.01 kg/m²   Allergies:  Patient has no known allergies.  Past Medical History:   Diagnosis Date    Abdominal cramping affecting pregnancy 2024    Herpes simplex virus (HSV) infection     Postpartum depression     Spontaneous       No past surgical history on file.  OB History          6    Para   4    Term   4       0    AB   2    Living   4         SAB   1    IAB   1    Ectopic   0    Molar   0    Multiple   0    Live Births   4              Family History   Problem Relation Age of Onset    Breast Cancer Neg Hx     Ovarian Cancer Neg Hx     Cervical Cancer Neg Hx     Uterine Cancer Neg Hx      Social History     Socioeconomic History    Marital status: Single     Spouse name: Not on file    Number of children: Not on file    Years of education: Not on file    Highest education level: Not on file   Occupational History    Not on file   Tobacco Use    Smoking status: Never    Smokeless tobacco: Never   Vaping Use    Vaping status: Never Used

## 2025-02-04 RX ORDER — SODIUM CHLORIDE, SODIUM LACTATE, POTASSIUM CHLORIDE, CALCIUM CHLORIDE 600; 310; 30; 20 MG/100ML; MG/100ML; MG/100ML; MG/100ML
INJECTION, SOLUTION INTRAVENOUS CONTINUOUS
Status: CANCELLED | OUTPATIENT
Start: 2025-02-04

## 2025-02-04 RX ORDER — SODIUM CHLORIDE 0.9 % (FLUSH) 0.9 %
5-40 SYRINGE (ML) INJECTION PRN
Status: CANCELLED | OUTPATIENT
Start: 2025-02-04

## 2025-02-04 RX ORDER — ACETAMINOPHEN 325 MG/1
1000 TABLET ORAL ONCE
Status: CANCELLED | OUTPATIENT
Start: 2025-02-04 | End: 2025-02-04

## 2025-02-04 RX ORDER — SODIUM CHLORIDE 9 MG/ML
INJECTION, SOLUTION INTRAVENOUS PRN
Status: CANCELLED | OUTPATIENT
Start: 2025-02-04

## 2025-02-04 RX ORDER — SODIUM CHLORIDE 0.9 % (FLUSH) 0.9 %
5-40 SYRINGE (ML) INJECTION EVERY 12 HOURS SCHEDULED
Status: CANCELLED | OUTPATIENT
Start: 2025-02-04

## 2025-02-12 RX ORDER — KETOROLAC TROMETHAMINE 10 MG/1
10 TABLET, FILM COATED ORAL EVERY 6 HOURS PRN
Qty: 40 TABLET | Refills: 0 | Status: SHIPPED | OUTPATIENT
Start: 2025-02-12

## 2025-02-28 ENCOUNTER — HOSPITAL ENCOUNTER (OUTPATIENT)
Dept: PREADMISSION TESTING | Age: 30
Discharge: HOME | End: 2025-03-04
Payer: COMMERCIAL

## 2025-02-28 VITALS
SYSTOLIC BLOOD PRESSURE: 104 MMHG | DIASTOLIC BLOOD PRESSURE: 59 MMHG | OXYGEN SATURATION: 98 % | HEIGHT: 65 IN | HEART RATE: 65 BPM | WEIGHT: 194.2 LBS | RESPIRATION RATE: 16 BRPM | BODY MASS INDEX: 32.36 KG/M2 | TEMPERATURE: 98.2 F

## 2025-02-28 LAB
ERYTHROCYTE [DISTWIDTH] IN BLOOD BY AUTOMATED COUNT: 18.4 % (ref 11.5–14.5)
HCT VFR BLD AUTO: 38.5 % (ref 37–47)
HGB BLD-MCNC: 12.9 G/DL (ref 12–16)
MCH RBC QN AUTO: 26.2 PG (ref 27–31.3)
MCHC RBC AUTO-ENTMCNC: 33.5 % (ref 33–37)
MCV RBC AUTO: 78.3 FL (ref 79.4–94.8)
PLATELET # BLD AUTO: 203 K/UL (ref 130–400)
RBC # BLD AUTO: 4.92 M/UL (ref 4.2–5.4)
WBC # BLD AUTO: 5.5 K/UL (ref 4.8–10.8)

## 2025-02-28 PROCEDURE — 85027 COMPLETE CBC AUTOMATED: CPT

## 2025-03-06 ENCOUNTER — ANESTHESIA (OUTPATIENT)
Dept: OPERATING ROOM | Age: 30
End: 2025-03-06
Payer: COMMERCIAL

## 2025-03-06 ENCOUNTER — ANESTHESIA EVENT (OUTPATIENT)
Dept: OPERATING ROOM | Age: 30
End: 2025-03-06
Payer: COMMERCIAL

## 2025-03-06 ENCOUNTER — HOSPITAL ENCOUNTER (OUTPATIENT)
Age: 30
Setting detail: OUTPATIENT SURGERY
Discharge: HOME | End: 2025-03-06
Attending: OBSTETRICS & GYNECOLOGY | Admitting: OBSTETRICS & GYNECOLOGY
Payer: COMMERCIAL

## 2025-03-06 VITALS
WEIGHT: 194 LBS | OXYGEN SATURATION: 97 % | HEIGHT: 65 IN | DIASTOLIC BLOOD PRESSURE: 57 MMHG | SYSTOLIC BLOOD PRESSURE: 110 MMHG | TEMPERATURE: 97 F | RESPIRATION RATE: 12 BRPM | HEART RATE: 84 BPM | BODY MASS INDEX: 32.32 KG/M2

## 2025-03-06 DIAGNOSIS — N93.9 ABNORMAL UTERINE BLEEDING (AUB): ICD-10-CM

## 2025-03-06 DIAGNOSIS — G89.18 POSTOPERATIVE PAIN: Primary | ICD-10-CM

## 2025-03-06 LAB
HCG, URINE, POC: NEGATIVE
Lab: NORMAL
NEGATIVE QC PASS/FAIL: NORMAL
POSITIVE QC PASS/FAIL: NORMAL

## 2025-03-06 PROCEDURE — 6360000002 HC RX W HCPCS: Performed by: ANESTHESIOLOGY

## 2025-03-06 PROCEDURE — 2709999900 HC NON-CHARGEABLE SUPPLY: Performed by: OBSTETRICS & GYNECOLOGY

## 2025-03-06 PROCEDURE — 2500000003 HC RX 250 WO HCPCS: Performed by: ANESTHESIOLOGY

## 2025-03-06 PROCEDURE — 58563 HYSTEROSCOPY ABLATION: CPT | Performed by: OBSTETRICS & GYNECOLOGY

## 2025-03-06 PROCEDURE — 7100000010 HC PHASE II RECOVERY - FIRST 15 MIN: Performed by: OBSTETRICS & GYNECOLOGY

## 2025-03-06 PROCEDURE — 7100000011 HC PHASE II RECOVERY - ADDTL 15 MIN: Performed by: OBSTETRICS & GYNECOLOGY

## 2025-03-06 PROCEDURE — 2720000010 HC SURG SUPPLY STERILE: Performed by: OBSTETRICS & GYNECOLOGY

## 2025-03-06 PROCEDURE — 3700000001 HC ADD 15 MINUTES (ANESTHESIA): Performed by: OBSTETRICS & GYNECOLOGY

## 2025-03-06 PROCEDURE — 88305 TISSUE EXAM BY PATHOLOGIST: CPT

## 2025-03-06 PROCEDURE — 7100000001 HC PACU RECOVERY - ADDTL 15 MIN: Performed by: OBSTETRICS & GYNECOLOGY

## 2025-03-06 PROCEDURE — 3700000000 HC ANESTHESIA ATTENDED CARE: Performed by: OBSTETRICS & GYNECOLOGY

## 2025-03-06 PROCEDURE — 6370000000 HC RX 637 (ALT 250 FOR IP): Performed by: OBSTETRICS & GYNECOLOGY

## 2025-03-06 PROCEDURE — 2500000003 HC RX 250 WO HCPCS: Performed by: OBSTETRICS & GYNECOLOGY

## 2025-03-06 PROCEDURE — 3600000014 HC SURGERY LEVEL 4 ADDTL 15MIN: Performed by: OBSTETRICS & GYNECOLOGY

## 2025-03-06 PROCEDURE — 64488 TAP BLOCK BI INJECTION: CPT | Performed by: ANESTHESIOLOGY

## 2025-03-06 PROCEDURE — 6360000002 HC RX W HCPCS: Performed by: OBSTETRICS & GYNECOLOGY

## 2025-03-06 PROCEDURE — 2580000003 HC RX 258: Performed by: ANESTHESIOLOGY

## 2025-03-06 PROCEDURE — 3600000004 HC SURGERY LEVEL 4 BASE: Performed by: OBSTETRICS & GYNECOLOGY

## 2025-03-06 PROCEDURE — 2580000003 HC RX 258: Performed by: OBSTETRICS & GYNECOLOGY

## 2025-03-06 PROCEDURE — 7100000000 HC PACU RECOVERY - FIRST 15 MIN: Performed by: OBSTETRICS & GYNECOLOGY

## 2025-03-06 PROCEDURE — 58661 LAPAROSCOPY REMOVE ADNEXA: CPT | Performed by: OBSTETRICS & GYNECOLOGY

## 2025-03-06 RX ORDER — SODIUM CHLORIDE 9 MG/ML
INJECTION, SOLUTION INTRAVENOUS PRN
Status: DISCONTINUED | OUTPATIENT
Start: 2025-03-06 | End: 2025-03-06 | Stop reason: HOSPADM

## 2025-03-06 RX ORDER — MAGNESIUM HYDROXIDE 1200 MG/15ML
LIQUID ORAL CONTINUOUS PRN
Status: DISCONTINUED | OUTPATIENT
Start: 2025-03-06 | End: 2025-03-06 | Stop reason: HOSPADM

## 2025-03-06 RX ORDER — DEXAMETHASONE SODIUM PHOSPHATE 4 MG/ML
INJECTION, SOLUTION INTRA-ARTICULAR; INTRALESIONAL; INTRAMUSCULAR; INTRAVENOUS; SOFT TISSUE
Status: DISCONTINUED | OUTPATIENT
Start: 2025-03-06 | End: 2025-03-06 | Stop reason: SDUPTHER

## 2025-03-06 RX ORDER — KETOROLAC TROMETHAMINE 30 MG/ML
30 INJECTION, SOLUTION INTRAMUSCULAR; INTRAVENOUS EVERY 6 HOURS
Status: DISCONTINUED | OUTPATIENT
Start: 2025-03-06 | End: 2025-03-06 | Stop reason: HOSPADM

## 2025-03-06 RX ORDER — ACETAMINOPHEN 500 MG
1000 TABLET ORAL EVERY 6 HOURS PRN
Qty: 60 TABLET | Refills: 0 | Status: SHIPPED | OUTPATIENT
Start: 2025-03-06

## 2025-03-06 RX ORDER — OXYCODONE HYDROCHLORIDE 5 MG/1
5 TABLET ORAL EVERY 4 HOURS PRN
Status: DISCONTINUED | OUTPATIENT
Start: 2025-03-06 | End: 2025-03-06 | Stop reason: HOSPADM

## 2025-03-06 RX ORDER — PROPOFOL 10 MG/ML
INJECTION, EMULSION INTRAVENOUS
Status: DISCONTINUED | OUTPATIENT
Start: 2025-03-06 | End: 2025-03-06 | Stop reason: SDUPTHER

## 2025-03-06 RX ORDER — MIDAZOLAM HYDROCHLORIDE 1 MG/ML
INJECTION, SOLUTION INTRAMUSCULAR; INTRAVENOUS
Status: DISCONTINUED | OUTPATIENT
Start: 2025-03-06 | End: 2025-03-06 | Stop reason: SDUPTHER

## 2025-03-06 RX ORDER — ONDANSETRON 4 MG/1
4 TABLET, ORALLY DISINTEGRATING ORAL EVERY 8 HOURS PRN
Status: DISCONTINUED | OUTPATIENT
Start: 2025-03-06 | End: 2025-03-06 | Stop reason: HOSPADM

## 2025-03-06 RX ORDER — OXYCODONE HYDROCHLORIDE 5 MG/1
10 TABLET ORAL EVERY 4 HOURS PRN
Status: DISCONTINUED | OUTPATIENT
Start: 2025-03-06 | End: 2025-03-06 | Stop reason: HOSPADM

## 2025-03-06 RX ORDER — NALOXONE HYDROCHLORIDE 0.4 MG/ML
INJECTION, SOLUTION INTRAMUSCULAR; INTRAVENOUS; SUBCUTANEOUS PRN
Status: DISCONTINUED | OUTPATIENT
Start: 2025-03-06 | End: 2025-03-06 | Stop reason: HOSPADM

## 2025-03-06 RX ORDER — OXYCODONE AND ACETAMINOPHEN 5; 325 MG/1; MG/1
2 TABLET ORAL NIGHTLY PRN
Qty: 4 TABLET | Refills: 0 | Status: SHIPPED | OUTPATIENT
Start: 2025-03-06 | End: 2025-03-08

## 2025-03-06 RX ORDER — HYDROMORPHONE HYDROCHLORIDE 1 MG/ML
1 INJECTION, SOLUTION INTRAMUSCULAR; INTRAVENOUS; SUBCUTANEOUS
Status: DISCONTINUED | OUTPATIENT
Start: 2025-03-06 | End: 2025-03-06 | Stop reason: HOSPADM

## 2025-03-06 RX ORDER — ONDANSETRON 2 MG/ML
INJECTION INTRAMUSCULAR; INTRAVENOUS
Status: DISCONTINUED | OUTPATIENT
Start: 2025-03-06 | End: 2025-03-06 | Stop reason: SDUPTHER

## 2025-03-06 RX ORDER — MEPERIDINE HYDROCHLORIDE 25 MG/ML
12.5 INJECTION INTRAMUSCULAR; INTRAVENOUS; SUBCUTANEOUS
Status: DISCONTINUED | OUTPATIENT
Start: 2025-03-06 | End: 2025-03-06 | Stop reason: HOSPADM

## 2025-03-06 RX ORDER — ONDANSETRON 2 MG/ML
4 INJECTION INTRAMUSCULAR; INTRAVENOUS EVERY 6 HOURS PRN
Status: DISCONTINUED | OUTPATIENT
Start: 2025-03-06 | End: 2025-03-06 | Stop reason: HOSPADM

## 2025-03-06 RX ORDER — FENTANYL CITRATE 50 UG/ML
50 INJECTION, SOLUTION INTRAMUSCULAR; INTRAVENOUS EVERY 10 MIN PRN
Status: DISCONTINUED | OUTPATIENT
Start: 2025-03-06 | End: 2025-03-06 | Stop reason: HOSPADM

## 2025-03-06 RX ORDER — DIPHENHYDRAMINE HYDROCHLORIDE 50 MG/ML
12.5 INJECTION INTRAMUSCULAR; INTRAVENOUS
Status: DISCONTINUED | OUTPATIENT
Start: 2025-03-06 | End: 2025-03-06 | Stop reason: HOSPADM

## 2025-03-06 RX ORDER — SODIUM CHLORIDE 0.9 % (FLUSH) 0.9 %
5-40 SYRINGE (ML) INJECTION PRN
Status: DISCONTINUED | OUTPATIENT
Start: 2025-03-06 | End: 2025-03-06 | Stop reason: HOSPADM

## 2025-03-06 RX ORDER — SODIUM CHLORIDE, SODIUM LACTATE, POTASSIUM CHLORIDE, CALCIUM CHLORIDE 600; 310; 30; 20 MG/100ML; MG/100ML; MG/100ML; MG/100ML
INJECTION, SOLUTION INTRAVENOUS CONTINUOUS
Status: DISCONTINUED | OUTPATIENT
Start: 2025-03-06 | End: 2025-03-06 | Stop reason: HOSPADM

## 2025-03-06 RX ORDER — SODIUM CHLORIDE 0.9 % (FLUSH) 0.9 %
5-40 SYRINGE (ML) INJECTION EVERY 12 HOURS SCHEDULED
Status: DISCONTINUED | OUTPATIENT
Start: 2025-03-06 | End: 2025-03-06 | Stop reason: HOSPADM

## 2025-03-06 RX ORDER — FENTANYL CITRATE 50 UG/ML
INJECTION, SOLUTION INTRAMUSCULAR; INTRAVENOUS
Status: DISCONTINUED | OUTPATIENT
Start: 2025-03-06 | End: 2025-03-06 | Stop reason: SDUPTHER

## 2025-03-06 RX ORDER — IBUPROFEN 800 MG/1
800 TABLET, FILM COATED ORAL EVERY 8 HOURS PRN
Qty: 60 TABLET | Refills: 1 | Status: SHIPPED | OUTPATIENT
Start: 2025-03-06

## 2025-03-06 RX ORDER — METOCLOPRAMIDE HYDROCHLORIDE 5 MG/ML
10 INJECTION INTRAMUSCULAR; INTRAVENOUS
Status: COMPLETED | OUTPATIENT
Start: 2025-03-06 | End: 2025-03-06

## 2025-03-06 RX ORDER — ROCURONIUM BROMIDE 10 MG/ML
INJECTION, SOLUTION INTRAVENOUS
Status: DISCONTINUED | OUTPATIENT
Start: 2025-03-06 | End: 2025-03-06 | Stop reason: SDUPTHER

## 2025-03-06 RX ORDER — BUPIVACAINE HYDROCHLORIDE 2.5 MG/ML
INJECTION, SOLUTION EPIDURAL; INFILTRATION; INTRACAUDAL
Status: DISCONTINUED | OUTPATIENT
Start: 2025-03-06 | End: 2025-03-06 | Stop reason: SDUPTHER

## 2025-03-06 RX ORDER — OXYCODONE HYDROCHLORIDE 5 MG/1
5 TABLET ORAL
Status: DISCONTINUED | OUTPATIENT
Start: 2025-03-06 | End: 2025-03-06 | Stop reason: HOSPADM

## 2025-03-06 RX ORDER — ACETAMINOPHEN 500 MG
1000 TABLET ORAL EVERY 8 HOURS PRN
Status: DISCONTINUED | OUTPATIENT
Start: 2025-03-06 | End: 2025-03-06 | Stop reason: HOSPADM

## 2025-03-06 RX ORDER — ACETAMINOPHEN 500 MG
1000 TABLET ORAL ONCE
Status: COMPLETED | OUTPATIENT
Start: 2025-03-06 | End: 2025-03-06

## 2025-03-06 RX ORDER — ONDANSETRON 2 MG/ML
4 INJECTION INTRAMUSCULAR; INTRAVENOUS
Status: DISCONTINUED | OUTPATIENT
Start: 2025-03-06 | End: 2025-03-06 | Stop reason: HOSPADM

## 2025-03-06 RX ADMIN — CEFOXITIN SODIUM 2000 MG: 2 POWDER, FOR SOLUTION INTRAVENOUS at 09:34

## 2025-03-06 RX ADMIN — MIDAZOLAM HYDROCHLORIDE 2 MG: 1 INJECTION, SOLUTION INTRAMUSCULAR; INTRAVENOUS at 09:15

## 2025-03-06 RX ADMIN — SUGAMMADEX 200 MG: 100 INJECTION, SOLUTION INTRAVENOUS at 10:20

## 2025-03-06 RX ADMIN — ONDANSETRON 4 MG: 2 INJECTION, SOLUTION INTRAMUSCULAR; INTRAVENOUS at 09:37

## 2025-03-06 RX ADMIN — KETOROLAC TROMETHAMINE 30 MG: 30 INJECTION, SOLUTION INTRAMUSCULAR at 12:52

## 2025-03-06 RX ADMIN — ROCURONIUM BROMIDE 50 MG: 10 SOLUTION INTRAVENOUS at 09:37

## 2025-03-06 RX ADMIN — BUPIVACAINE HYDROCHLORIDE 30 ML: 2.5 INJECTION, SOLUTION EPIDURAL; INFILTRATION; INTRACAUDAL at 09:15

## 2025-03-06 RX ADMIN — BUPIVACAINE HYDROCHLORIDE 30 ML: 2.5 INJECTION, SOLUTION EPIDURAL; INFILTRATION; INTRACAUDAL at 09:18

## 2025-03-06 RX ADMIN — SODIUM CHLORIDE, POTASSIUM CHLORIDE, SODIUM LACTATE AND CALCIUM CHLORIDE: 600; 310; 30; 20 INJECTION, SOLUTION INTRAVENOUS at 09:05

## 2025-03-06 RX ADMIN — FENTANYL CITRATE 100 MCG: 50 INJECTION, SOLUTION INTRAMUSCULAR; INTRAVENOUS at 09:37

## 2025-03-06 RX ADMIN — HYDROMORPHONE HYDROCHLORIDE 0.5 MG: 1 INJECTION, SOLUTION INTRAMUSCULAR; INTRAVENOUS; SUBCUTANEOUS at 11:38

## 2025-03-06 RX ADMIN — PROPOFOL 200 MG: 10 INJECTION, EMULSION INTRAVENOUS at 09:37

## 2025-03-06 RX ADMIN — SODIUM CHLORIDE: 0.9 INJECTION, SOLUTION INTRAVENOUS at 12:56

## 2025-03-06 RX ADMIN — DEXAMETHASONE SODIUM PHOSPHATE 4 MG: 4 INJECTION INTRA-ARTICULAR; INTRALESIONAL; INTRAMUSCULAR; INTRAVENOUS; SOFT TISSUE at 09:37

## 2025-03-06 RX ADMIN — HYDROMORPHONE HYDROCHLORIDE 0.5 MG: 1 INJECTION, SOLUTION INTRAMUSCULAR; INTRAVENOUS; SUBCUTANEOUS at 11:23

## 2025-03-06 RX ADMIN — ACETAMINOPHEN 1000 MG: 500 TABLET ORAL at 08:37

## 2025-03-06 RX ADMIN — METOCLOPRAMIDE 10 MG: 5 INJECTION, SOLUTION INTRAMUSCULAR; INTRAVENOUS at 12:36

## 2025-03-06 ASSESSMENT — PAIN - FUNCTIONAL ASSESSMENT: PAIN_FUNCTIONAL_ASSESSMENT: ACTIVITIES ARE NOT PREVENTED

## 2025-03-06 ASSESSMENT — PAIN DESCRIPTION - ORIENTATION
ORIENTATION: MID

## 2025-03-06 ASSESSMENT — PAIN SCALES - GENERAL
PAINLEVEL_OUTOF10: 5
PAINLEVEL_OUTOF10: 7
PAINLEVEL_OUTOF10: 1
PAINLEVEL_OUTOF10: 5
PAINLEVEL_OUTOF10: 8
PAINLEVEL_OUTOF10: 0

## 2025-03-06 ASSESSMENT — PAIN DESCRIPTION - DESCRIPTORS
DESCRIPTORS: ACHING
DESCRIPTORS: CRAMPING
DESCRIPTORS: CRAMPING

## 2025-03-06 ASSESSMENT — PAIN DESCRIPTION - LOCATION
LOCATION: ABDOMEN

## 2025-03-06 NOTE — ANESTHESIA PRE PROCEDURE
APTT 25.5 02/10/2014 01:41 PM       HCG (If Applicable):   Lab Results   Component Value Date    PREGTESTUR NEGATIVE 08/16/2021    HCGQUANT <0.1 02/25/2018        ABGs: No results found for: \"PHART\", \"PO2ART\", \"IOI8CID\", \"RPZ5BGZ\", \"BEART\", \"Z8INRNQD\"     Type & Screen (If Applicable):  Lab Results   Component Value Date    ABORH AB POS 12/08/2024    LABANTI NEG 12/08/2024       Drug/Infectious Status (If Applicable):  No results found for: \"HIV\", \"HEPCAB\"    COVID-19 Screening (If Applicable):   Lab Results   Component Value Date/Time    COVID19 Not Detected 04/07/2024 10:28 AM           Anesthesia Evaluation  Patient summary reviewed and Nursing notes reviewed   no history of anesthetic complications:   Airway: Mallampati: II  TM distance: >3 FB   Neck ROM: full  Mouth opening: > = 3 FB   Dental: normal exam         Pulmonary:Negative Pulmonary ROS and normal exam                               Cardiovascular:Negative CV ROS                      Neuro/Psych:   (+) psychiatric history:            GI/Hepatic/Renal:   (+) morbid obesity          Endo/Other: Negative Endo/Other ROS                    Abdominal:   (+) obese          Vascular: negative vascular ROS.         Other Findings:       Anesthesia Plan      general     ASA 2       Induction: intravenous.    MIPS: Prophylactic antiemetics administered.  Anesthetic plan and risks discussed with patient.              Post-op pain plan if not by surgeon: single peripheral nerve block        Phong Choe MD   3/6/2025

## 2025-03-06 NOTE — H&P
Corinne Kaiser is a 29 y.o. female who presents here today for complaints of Follow-up (US done 25.)         History of Present Illness  The patient presents for evaluation of adenomyosis.     She reports persistent fatigue and ongoing pelvic discomfort and heavy bleeding, here for follow up on US results. She has expressed a definitive decision against future childbirth. Her last delivery was on 2024, and she experienced severe pain during her menstrual cycle postpartum, reminiscent of labor pains.  Patient also complaining of severe migraines that would stay days at a time according to her description, not resolving with NSAIDs or extra strength Tylenol.  Patient mentions that she did have occasional migraines in the past but not as bad as severe as her current migraines.        Vitals:  /60 (Site: Right Upper Arm, Position: Sitting, Cuff Size: Medium Adult)   Pulse 72   Ht 1.6 m (5' 3\")   Wt 87.1 kg (192 lb)   BMI 34.01 kg/m²   Allergies:  Patient has no known allergies.  Past Medical History        Past Medical History:   Diagnosis Date    Abdominal cramping affecting pregnancy 2024    Herpes simplex virus (HSV) infection      Postpartum depression      Spontaneous           Past Surgical History   No past surgical history on file.     OB History            6    Para   4    Term   4       0    AB   2    Living   4           SAB   1    IAB   1    Ectopic   0    Molar   0    Multiple   0    Live Births   4                Family History         Family History   Problem Relation Age of Onset    Breast Cancer Neg Hx      Ovarian Cancer Neg Hx      Cervical Cancer Neg Hx      Uterine Cancer Neg Hx           Social History               Socioeconomic History    Marital status: Single       Spouse name: Not on file    Number of children: Not on file    Years of education: Not on file    Highest education level: Not on file   Occupational History    Not on file  but not the muscle layer. If the adenomyosis advances, she may experience recurrent symptoms due to the invasion of the muscle layer. An endometrial ablation using NovaSure and a laparoscopic bilateral salpingectomy will be performed. This procedure will result in the cessation of her periods and fertility.     For hysteroscopy with Novasure ablation with laparoscopic bilateral salpingectomy.      Counseling:  The patient was counseled on all options both medical and surgical, conservative as well as definitive. She has elected to proceed with the procedure as stated above.     The patient was counseled on the procedure. Risks and complications were reviewed in detail. The patients orders, labs, consents have been completed. The history and physical as well as all supporting surgical documentation will be forwarded to the pre-operative holding area.     The patient is aware that this procedure may not alleviate her symptoms. That there may be a necessity for a second surgery and that there may be an incomplete removal of abnormal tissue.     Discussed all risks and benefits of procedure in detail including risks of anesthesia, blood loss, need for transfusion, infection;  also potential for complication, injury, need for repair and/or removal of uterus, tubes, ovaries, bowel, bladder, ureters, blood vessels and nerves.  Also discussed pre-operative and post-operative expectations.  Patient verbalizes understanding.     The patient (or guardian, if applicable) and other individuals in attendance with the patient were advised that Artificial Intelligence will be utilized during this visit to record, process the conversation to generate a clinical note, and support improvement of the AI technology. The patient (or guardian, if applicable) and other individuals in attendance at the appointment consented to the use of AI, including the recording.       Patricia Morales MD

## 2025-03-06 NOTE — PROGRESS NOTES
Pt became nauseated when hob elevated, vomited small amount clear fluids, medicated pt with reglan 10mg iv pe protocol

## 2025-03-06 NOTE — OP NOTE
Op Note       Brief Postoperative Note  ______________________________________________________________    Patient: Corinne Kaiser  YOB: 1995  MRN: [id number]  Date of Procedure: 3/6/2025    Pre-Op Diagnosis: Abnormal uterine bleeding (AUB) [N93.9]    Post-Op Diagnosis: Same       Procedure(s):  HYSTEROSCOPY, ENDOMERTRIAL NOVASURE ABLATION, LAPAROSCOPIC BILATERAL SALPINGECTOMY    Anesthesia: General    Surgeon(s):  Patricia Morales MD    Staff:  First Assistant: Demi Presley  Scrub Person First: Kesha Dyson     Estimated Blood Loss: minimal     Complications: None    Specimens:   ID Type Source Tests Collected by Time Destination   A : LEFT FALLOPIAN TUBE Tissue Fallopian Tube SURGICAL PATHOLOGY Patricia Morales MD 3/6/2025 0828    B : RIGHT FALLOPIAN TUBE Tissue Fallopian Tube SURGICAL PATHOLOGY Patricia Morales MD 3/6/2025 1017        Implants:  * No implants in log *      Drains: * No LDAs found *    Findings: normal ovaries and tubes         Procedure Details   The patient was seen in the Holding Room. The risks, benefits, complications, treatment options, and expected outcomes were discussed with the patient. The patient concurred with the proposed plan, giving informed consent.     After induction of anesthesia, the patient was draped and prepped in the usual sterile manner. Pt was placed in dorsal lithotomy position after anesthesia and draped and prepped in the usual sterile manner for laparoscopic and vaginal procedures.     Attention was first towards the abdomen, a 5 mm incision was made in the subumbilical area using an 11 blade scalpel,  through which a veress needle was inserted, entry pressure at 5 mmhg , abdomen then inflated to 15 mmhg of CO2 gas .   2 more 5 mm incisions were then made , one on the left side slightly below the level of the umbilicus and 15 cm lateral to the midline through which a 5 mm trocar was inserted , another 2 cm medial to the left ASIS then 5mm trocars  SWAPNAG

## 2025-03-06 NOTE — ANESTHESIA POSTPROCEDURE EVALUATION
Department of Anesthesiology  Postprocedure Note    Patient: Corinne Kaiser  MRN: [id number]  YOB: 1995  Date of evaluation: 3/6/2025    Procedure Summary       Date: 03/06/25 Room / Location: [address]    Anesthesia Start: 0934 Anesthesia Stop:     Procedure: HYSTEROSCOPY, ENDOMERTRIAL NOVASURE ABLATION, LAPAROSCOPIC BILATERAL SALPINGECTOMY (Abdomen/Perineum) Diagnosis:       Abnormal uterine bleeding (AUB)      (Abnormal uterine bleeding (AUB) [N93.9])    Surgeons: Patricia Morales MD Responsible Provider:     Anesthesia Type: general ASA Status: 2            Anesthesia Type: No value filed.    Delia Phase I: Delia Score: 10    Delia Phase II:      Anesthesia Post Evaluation    Patient location during evaluation: PACU  Patient participation: complete - patient participated  Level of consciousness: awake  Pain score: 0  Airway patency: patent  Nausea & Vomiting: no nausea  Cardiovascular status: blood pressure returned to baseline  Respiratory status: acceptable  Hydration status: euvolemic  Pain management: adequate    No notable events documented.

## 2025-03-06 NOTE — PROGRESS NOTES
CLINICAL PHARMACY NOTE: MEDS TO BEDS    Total # of Prescriptions Filled: 3   The following medications were delivered to the patient:  Ibuprofen 800 mg Tab  Acetaminophen 500 mg Tab  Oxycodone-Apap 5-325 mg Tab    Additional Documentation:

## 2025-03-06 NOTE — DISCHARGE INSTRUCTIONS
NO INTERCOURSE for 1 week   NOTHING VAGINAL : NO TAMPONS , NO DOUCHING , NO INTERCOURSE. For 1 week       [facility]  Outpatient Discharge Instructions    To continue your care at home, please follow the instructions below and any additional discharge instructions given to you by your physician.    GENERAL ANESTHESIA:  Do not drive or operate machinery for 24hrs after discharge,  Do not drink alcohol, take tranquilizers, sleeping medication, or any other medication not directly instructed by your physician,   Do not make any important decisions or sign any legal documents for 24hrs after surgery,  Have someone with you for 24hrs after surgery to assist you as needed.    ACTIVITY:  Light activity for 24hrs,  No heavy lifting or exercise until instructed by your physician,  You may resume normal activities once instructed by your physician,  Special Instruction: ___________________________________________________________________    FLUIDS AND DIET:  An upset stomach or feeling sick (nausea) can commonly occur after surgery and/or pain medication use. To help minimize nausea:  Do not eat a heavy meal soon after your surgery,    Start with water or other clear liquids,  Advance to mild or bland items like Jell-O, dry toast, crackers, etc.,  Avoid caffeine,  Do not drink alcohol for at least 24 hours after surgery,  Your physician may prescribe anti-nausea medication if your nausea continues,  If you are free from nausea for 24hrs, you can advance to your normal diet as tolerated.    OPERATIVE SITE:  A small amount of bleeding or drainage after surgery is normal. Your physician will provide you with specific instructions on how to care for your surgical site and/or dressing.   Try not to touch your surgical site unless necessary,   Always wash your hands BEFORE and AFTER changing your dressing if instructed by your physician,   Proper handwashing includes wetting your hands with clean water, applying soap,  lathering your hands by rubbing them together with soap for 20 seconds, rinsing them with clean water, and drying them with a clean towel. If soap and water is not available, alcohol-based  may be applied by rubbing the hands together and allowing them to dry for 20 seconds.  Special Instructions: __________________________________________________________________      PAIN:  Pain after surgery is normal and should be expected. When you go home, the anesthesia wears off, and you may experience increased discomfort. Your provider will give you specific instructions on what pain medication to take at home. Listed below is additional information on treating pain after surgery:  Pain medication can give you an upset stomach. Unless your provider has instructed you not to eat or drink, the pain medication should be taken with a small amount of food. Eating will decrease the chance of an upset stomach.  Pain medication can take about 20-30 minutes to start working, so do not wait until the pain worsens before taking a dose. Remember, always take over-the-counter and prescription drugs only as directed by your provider.  Unless otherwise instructed by your provider, applying ice on or around your surgical site can be a great way to help minimize pain and swelling after surgery.  Apply ice to the affected area a minimum of 4 times daily for no longer than 15-20 minutes at a time. It is very important to protect your skin by NOT applying ice or ice pack directly to your skin. Always have a towel or pillow case between your skin and the ice. Frozen vegetable packs like peas or corn make great inexpensive alternatives for use as an icepack.    FOLLOW UP CARE - Call your Physician if any of the following occur:  Increased swelling, redness, warmth, hardness around operative area,  Blood soaked dressings (small amounts of oozing may be normal),  Numb, tingling, or cold fingers or toes (for surgeries on

## 2025-03-06 NOTE — ANESTHESIA PROCEDURE NOTES
Peripheral Block    Patient location during procedure: pre-op  Reason for block: post-op pain management and at surgeon's request  Start time: 3/6/2025 9:15 AM  Staffing  Performed: anesthesiologist   Anesthesiologist: Phong Choe MD  Performed by: Phong Choe MD  Authorized by: Phong Choe MD    Preanesthetic Checklist  Completed: patient identified, IV checked, site marked, risks and benefits discussed, surgical/procedural consents, equipment checked, pre-op evaluation, timeout performed, anesthesia consent given, oxygen available and monitors applied/VS acknowledged  Peripheral Block   Patient position: supine  Prep: ChloraPrep  Provider prep: mask and sterile gloves (Sterile probe cover)  Patient monitoring: cardiac monitor, continuous pulse ox, frequent blood pressure checks and IV access  Block type: TAP  Laterality: bilateral  Injection technique: single-shot  Guidance: ultrasound guided  Local infiltration: bupivacaine  Infiltration strength: 0.25 %  Local infiltration: bupivacaine  Dose: 30 mLDose: 30 mL    Needle   Needle type: combined needle/nerve stimulator   Needle gauge: 22 G  Needle localization: anatomical landmarks and ultrasound guidance  Test dose: negative  Needle length: 5 cm  Assessment   Injection assessment: negative aspiration for heme, no paresthesia on injection and local visualized surrounding nerve on ultrasound  Paresthesia pain: immediately resolved  Slow fractionated injection: yes  Hemodynamics: stable  Outcomes: uncomplicated and patient tolerated procedure well    Additional Notes  Ultrasound guidance used to view needle for placement.    Ultrasound image stored,  printed and saved in patient chart.    Sterile probe cover used

## 2025-03-19 ENCOUNTER — TELEPHONE (OUTPATIENT)
Dept: OBGYN CLINIC | Age: 30
End: 2025-03-19

## 2025-03-19 NOTE — TELEPHONE ENCOUNTER
Patient had to reschedule her post op and would like to know if something can be sent in for her. She states that she get's an itch and then when she touches it she breaks out in hives. Nurse informed patient that we probably couldn't send in anything as we have no idea what we would be treating but patient wanted note sent to Dr. Morales and his nurse.

## 2025-03-28 RX ORDER — DIPHENHYDRAMINE HCL 25 MG
25 TABLET ORAL EVERY 6 HOURS PRN
Qty: 30 TABLET | Refills: 0 | Status: SHIPPED | OUTPATIENT
Start: 2025-03-28 | End: 2025-04-27

## 2025-05-09 RX ORDER — DIPHENHYDRAMINE HYDROCHLORIDE 25 MG/1
CAPSULE ORAL
Qty: 30 CAPSULE | Refills: 0 | Status: SHIPPED | OUTPATIENT
Start: 2025-05-09

## 2025-06-16 RX ORDER — DIPHENHYDRAMINE HYDROCHLORIDE 25 MG/1
CAPSULE ORAL
Qty: 30 CAPSULE | Refills: 0 | Status: SHIPPED | OUTPATIENT
Start: 2025-06-16

## 2025-06-30 DIAGNOSIS — G43.809 OTHER MIGRAINE WITHOUT STATUS MIGRAINOSUS, NOT INTRACTABLE: ICD-10-CM

## 2025-07-02 RX ORDER — BUTALBITAL, ACETAMINOPHEN AND CAFFEINE 50; 325; 40 MG/1; MG/1; MG/1
TABLET ORAL
Qty: 40 TABLET | Refills: 0 | Status: SHIPPED | OUTPATIENT
Start: 2025-07-02

## (undated) DEVICE — SET ENDOSCP SEAL HYSTEROSCOPE RIG OUTFLO CHN DISP MYOSURE

## (undated) DEVICE — TROCAR: Brand: KII® SLEEVE

## (undated) DEVICE — SPONGE,LAP,18"X18",DLX,XR,ST,5/PK,40/PK: Brand: MEDLINE

## (undated) DEVICE — GLOVE ORANGE PI 8 1/2   MSG9085

## (undated) DEVICE — GLOVE SURG SZ 9 THK91MIL LTX FREE SYN POLYISOPRENE ANTI

## (undated) DEVICE — GAUZE,SPONGE,4"X4",16PLY,XRAY,STRL,LF: Brand: MEDLINE

## (undated) DEVICE — MANIPULATOR UTER INSTRUMENT ZUMI

## (undated) DEVICE — SUTURE MONOCRYL SZ 4-0 L27IN ABSRB UD L19MM PS-2 1/2 CIR PRIM Y426H

## (undated) DEVICE — DEVICE TRCR 12X9X3IN WHT CLSR DISP OMNICLOSE

## (undated) DEVICE — NEEDLE INSUF L120MM ULT VERES ENDOPATH

## (undated) DEVICE — PAD BD FOAM 33X72X2.75 IN BLU EGGCRATE

## (undated) DEVICE — GYN LAPAROSCOPY: Brand: MEDLINE INDUSTRIES, INC.

## (undated) DEVICE — SEALER LAP L37CM MARYLAND JAW OPN NANO COAT MULTIFUNCTIONAL

## (undated) DEVICE — SET FLD CTRL SYS INFLO AND OUTFLO TB AQUILEX

## (undated) DEVICE — TROCAR: Brand: KII FIOS FIRST ENTRY

## (undated) DEVICE — GOWN,AURORA,NONRNF,XL,30/CS: Brand: MEDLINE

## (undated) DEVICE — PAD N ADH W3XL4IN POLY COT SFT PERF FLM EASILY CUT ABSRB

## (undated) DEVICE — LABEL MED MINI W/ MARKER

## (undated) DEVICE — KIT CANSTR VAC TANTEM TB FOR AQUILEX FLD CTRL SYS

## (undated) DEVICE — KIT NOVASURE V5 THERMOABLATION DEVICE

## (undated) DEVICE — SHEET BD STD REPOS LO FICT BTM SFT TOP NO STRP 9 HNDL PER